# Patient Record
Sex: FEMALE | Race: BLACK OR AFRICAN AMERICAN | NOT HISPANIC OR LATINO | Employment: FULL TIME | URBAN - METROPOLITAN AREA
[De-identification: names, ages, dates, MRNs, and addresses within clinical notes are randomized per-mention and may not be internally consistent; named-entity substitution may affect disease eponyms.]

---

## 2021-07-06 ENCOUNTER — OFFICE VISIT (OUTPATIENT)
Dept: CARDIOLOGY | Facility: CLINIC | Age: 61
End: 2021-07-06
Payer: COMMERCIAL

## 2021-07-06 VITALS
BODY MASS INDEX: 51.91 KG/M2 | HEART RATE: 40 BPM | SYSTOLIC BLOOD PRESSURE: 134 MMHG | OXYGEN SATURATION: 95 % | HEIGHT: 63 IN | DIASTOLIC BLOOD PRESSURE: 92 MMHG | WEIGHT: 293 LBS

## 2021-07-06 DIAGNOSIS — I10 ESSENTIAL HYPERTENSION: ICD-10-CM

## 2021-07-06 DIAGNOSIS — R00.1 BRADYCARDIA ON ECG: ICD-10-CM

## 2021-07-06 DIAGNOSIS — E05.90 HYPERTHYROIDISM: ICD-10-CM

## 2021-07-06 PROCEDURE — 1126F PR PAIN SEVERITY QUANTIFIED, NO PAIN PRESENT: ICD-10-PCS | Mod: S$GLB,,, | Performed by: INTERNAL MEDICINE

## 2021-07-06 PROCEDURE — 99999 PR PBB SHADOW E&M-NEW PATIENT-LVL III: CPT | Mod: PBBFAC,,, | Performed by: INTERNAL MEDICINE

## 2021-07-06 PROCEDURE — 99999 PR PBB SHADOW E&M-NEW PATIENT-LVL III: ICD-10-PCS | Mod: PBBFAC,,, | Performed by: INTERNAL MEDICINE

## 2021-07-06 PROCEDURE — 93000 EKG 12-LEAD: ICD-10-PCS | Mod: S$GLB,,, | Performed by: INTERNAL MEDICINE

## 2021-07-06 PROCEDURE — 99204 OFFICE O/P NEW MOD 45 MIN: CPT | Mod: S$GLB,,, | Performed by: INTERNAL MEDICINE

## 2021-07-06 PROCEDURE — 3008F BODY MASS INDEX DOCD: CPT | Mod: CPTII,S$GLB,, | Performed by: INTERNAL MEDICINE

## 2021-07-06 PROCEDURE — 1126F AMNT PAIN NOTED NONE PRSNT: CPT | Mod: S$GLB,,, | Performed by: INTERNAL MEDICINE

## 2021-07-06 PROCEDURE — 93000 ELECTROCARDIOGRAM COMPLETE: CPT | Mod: S$GLB,,, | Performed by: INTERNAL MEDICINE

## 2021-07-06 PROCEDURE — 3008F PR BODY MASS INDEX (BMI) DOCUMENTED: ICD-10-PCS | Mod: CPTII,S$GLB,, | Performed by: INTERNAL MEDICINE

## 2021-07-06 PROCEDURE — 99204 PR OFFICE/OUTPT VISIT, NEW, LEVL IV, 45-59 MIN: ICD-10-PCS | Mod: S$GLB,,, | Performed by: INTERNAL MEDICINE

## 2021-07-06 RX ORDER — FLUTICASONE PROPIONATE 50 MCG
1 SPRAY, SUSPENSION (ML) NASAL 2 TIMES DAILY
COMMUNITY
Start: 2021-05-19 | End: 2022-09-01

## 2021-07-06 RX ORDER — AMLODIPINE BESYLATE 10 MG/1
10 TABLET ORAL DAILY
Status: ON HOLD | COMMUNITY
Start: 2021-04-15 | End: 2022-12-09 | Stop reason: HOSPADM

## 2021-07-06 RX ORDER — METHIMAZOLE 5 MG/1
5 TABLET ORAL DAILY
COMMUNITY
Start: 2021-05-31 | End: 2022-09-01

## 2021-07-06 RX ORDER — MONTELUKAST SODIUM 10 MG/1
10 TABLET ORAL DAILY
Status: ON HOLD | COMMUNITY
Start: 2021-05-19 | End: 2022-12-09 | Stop reason: HOSPADM

## 2021-07-06 RX ORDER — CYANOCOBALAMIN (VITAMIN B-12) 1000 MCG
TABLET, EXTENDED RELEASE ORAL
COMMUNITY
End: 2022-09-01

## 2021-07-07 ENCOUNTER — HOSPITAL ENCOUNTER (OUTPATIENT)
Dept: CARDIOLOGY | Facility: OTHER | Age: 61
Discharge: HOME | End: 2021-07-07
Attending: INTERNAL MEDICINE
Payer: COMMERCIAL

## 2021-07-07 VITALS
HEART RATE: 40 BPM | BODY MASS INDEX: 51.91 KG/M2 | HEIGHT: 63 IN | WEIGHT: 293 LBS | DIASTOLIC BLOOD PRESSURE: 92 MMHG | SYSTOLIC BLOOD PRESSURE: 134 MMHG

## 2021-07-07 DIAGNOSIS — R00.1 BRADYCARDIA ON ECG: ICD-10-CM

## 2021-07-07 LAB
ASCENDING AORTA: 3.02 CM
AV INDEX (PROSTH): 0.87
AV MEAN GRADIENT: 8 MMHG
AV PEAK GRADIENT: 16 MMHG
AV VALVE AREA: 3.03 CM2
AV VELOCITY RATIO: 0.85
BSA FOR ECHO PROCEDURE: 2.5 M2
CV ECHO LV RWT: 0.32 CM
DOP CALC AO PEAK VEL: 2.03 M/S
DOP CALC AO VTI: 45.04 CM
DOP CALC LVOT AREA: 3.5 CM2
DOP CALC LVOT DIAMETER: 2.11 CM
DOP CALC LVOT PEAK VEL: 1.72 M/S
DOP CALC LVOT STROKE VOLUME: 136.3 CM3
DOP CALCLVOT PEAK VEL VTI: 39 CM
E WAVE DECELERATION TIME: 153.37 MSEC
E/A RATIO: 1.24
E/E' RATIO: 7.16 M/S
ECHO LV POSTERIOR WALL: 0.89 CM (ref 0.6–1.1)
EJECTION FRACTION: 69 %
FRACTIONAL SHORTENING: 39 % (ref 28–44)
INTERVENTRICULAR SEPTUM: 0.91 CM (ref 0.6–1.1)
IVRT: 110.91 MSEC
LA MAJOR: 5.92 CM
LA MINOR: 5.66 CM
LA WIDTH: 4.53 CM
LEFT ATRIUM SIZE: 3.82 CM
LEFT ATRIUM VOLUME INDEX MOD: 41.6 ML/M2
LEFT ATRIUM VOLUME INDEX: 36.5 ML/M2
LEFT ATRIUM VOLUME MOD: 97 CM3
LEFT ATRIUM VOLUME: 85.12 CM3
LEFT INTERNAL DIMENSION IN SYSTOLE: 3.44 CM (ref 2.1–4)
LEFT VENTRICLE DIASTOLIC VOLUME INDEX: 66.48 ML/M2
LEFT VENTRICLE DIASTOLIC VOLUME: 154.91 ML
LEFT VENTRICLE MASS INDEX: 83 G/M2
LEFT VENTRICLE SYSTOLIC VOLUME INDEX: 20.9 ML/M2
LEFT VENTRICLE SYSTOLIC VOLUME: 48.69 ML
LEFT VENTRICULAR INTERNAL DIMENSION IN DIASTOLE: 5.62 CM (ref 3.5–6)
LEFT VENTRICULAR MASS: 192.8 G
LV LATERAL E/E' RATIO: 7.56 M/S
LV SEPTAL E/E' RATIO: 6.8 M/S
MV A" WAVE DURATION": 9.43 MSEC
MV PEAK A VEL: 0.55 M/S
MV PEAK E VEL: 0.68 M/S
MV STENOSIS PRESSURE HALF TIME: 44.48 MS
MV VALVE AREA P 1/2 METHOD: 4.95 CM2
PISA MRMAX VEL: 0.02 M/S
PISA TR MAX VEL: 1.93 M/S
PULM VEIN S/D RATIO: 1.55
PV PEAK D VEL: 0.42 M/S
PV PEAK S VEL: 0.65 M/S
PV PEAK VELOCITY: 1.41 CM/S
RA MAJOR: 5.69 CM
RA PRESSURE: 3 MMHG
RA WIDTH: 3.57 CM
RIGHT VENTRICULAR END-DIASTOLIC DIMENSION: 2.63 CM
RV TISSUE DOPPLER FREE WALL SYSTOLIC VELOCITY 1 (APICAL 4 CHAMBER VIEW): 15.75 CM/S
SINUS: 3.2 CM
STJ: 2.94 CM
TDI LATERAL: 0.09 M/S
TDI SEPTAL: 0.1 M/S
TDI: 0.1 M/S
TR MAX PG: 15 MMHG
TRICUSPID ANNULAR PLANE SYSTOLIC EXCURSION: 2.83 CM
TV REST PULMONARY ARTERY PRESSURE: 18 MMHG

## 2021-07-07 PROCEDURE — 93306 ECHO (CUPID ONLY): ICD-10-PCS | Mod: 26,,, | Performed by: INTERNAL MEDICINE

## 2021-07-07 PROCEDURE — 93306 TTE W/DOPPLER COMPLETE: CPT

## 2021-07-07 PROCEDURE — 93226 XTRNL ECG REC<48 HR SCAN A/R: CPT

## 2021-07-07 PROCEDURE — 93306 TTE W/DOPPLER COMPLETE: CPT | Mod: 26,,, | Performed by: INTERNAL MEDICINE

## 2021-07-07 PROCEDURE — 93227 HOLTER MONITOR - 24 HOUR (CUPID ONLY): ICD-10-PCS | Mod: ,,, | Performed by: INTERNAL MEDICINE

## 2021-07-07 PROCEDURE — 93227 XTRNL ECG REC<48 HR R&I: CPT | Mod: ,,, | Performed by: INTERNAL MEDICINE

## 2021-07-14 LAB
OHS CV EVENT MONITOR DAY: 0
OHS CV HOLTER LENGTH DECIMAL HOURS: 24
OHS CV HOLTER LENGTH HOURS: 24
OHS CV HOLTER LENGTH MINUTES: 0

## 2021-07-16 ENCOUNTER — OFFICE VISIT (OUTPATIENT)
Dept: CARDIOLOGY | Facility: CLINIC | Age: 61
End: 2021-07-16
Payer: COMMERCIAL

## 2021-07-16 VITALS
BODY MASS INDEX: 51.91 KG/M2 | SYSTOLIC BLOOD PRESSURE: 130 MMHG | OXYGEN SATURATION: 98 % | HEIGHT: 63 IN | HEART RATE: 47 BPM | DIASTOLIC BLOOD PRESSURE: 80 MMHG | WEIGHT: 293 LBS

## 2021-07-16 DIAGNOSIS — R00.1 BRADYCARDIA ON ECG: ICD-10-CM

## 2021-07-16 DIAGNOSIS — I10 ESSENTIAL HYPERTENSION: Primary | ICD-10-CM

## 2021-07-16 PROCEDURE — 99999 PR PBB SHADOW E&M-EST. PATIENT-LVL III: CPT | Mod: PBBFAC,,, | Performed by: INTERNAL MEDICINE

## 2021-07-16 PROCEDURE — 3079F DIAST BP 80-89 MM HG: CPT | Mod: CPTII,S$GLB,, | Performed by: INTERNAL MEDICINE

## 2021-07-16 PROCEDURE — 1126F PR PAIN SEVERITY QUANTIFIED, NO PAIN PRESENT: ICD-10-PCS | Mod: S$GLB,,, | Performed by: INTERNAL MEDICINE

## 2021-07-16 PROCEDURE — 99214 OFFICE O/P EST MOD 30 MIN: CPT | Mod: S$GLB,,, | Performed by: INTERNAL MEDICINE

## 2021-07-16 PROCEDURE — 3008F PR BODY MASS INDEX (BMI) DOCUMENTED: ICD-10-PCS | Mod: CPTII,S$GLB,, | Performed by: INTERNAL MEDICINE

## 2021-07-16 PROCEDURE — 3075F PR MOST RECENT SYSTOLIC BLOOD PRESS GE 130-139MM HG: ICD-10-PCS | Mod: CPTII,S$GLB,, | Performed by: INTERNAL MEDICINE

## 2021-07-16 PROCEDURE — 3008F BODY MASS INDEX DOCD: CPT | Mod: CPTII,S$GLB,, | Performed by: INTERNAL MEDICINE

## 2021-07-16 PROCEDURE — 3079F PR MOST RECENT DIASTOLIC BLOOD PRESSURE 80-89 MM HG: ICD-10-PCS | Mod: CPTII,S$GLB,, | Performed by: INTERNAL MEDICINE

## 2021-07-16 PROCEDURE — 3075F SYST BP GE 130 - 139MM HG: CPT | Mod: CPTII,S$GLB,, | Performed by: INTERNAL MEDICINE

## 2021-07-16 PROCEDURE — 99214 PR OFFICE/OUTPT VISIT, EST, LEVL IV, 30-39 MIN: ICD-10-PCS | Mod: S$GLB,,, | Performed by: INTERNAL MEDICINE

## 2021-07-16 PROCEDURE — 99999 PR PBB SHADOW E&M-EST. PATIENT-LVL III: ICD-10-PCS | Mod: PBBFAC,,, | Performed by: INTERNAL MEDICINE

## 2021-07-16 PROCEDURE — 1126F AMNT PAIN NOTED NONE PRSNT: CPT | Mod: S$GLB,,, | Performed by: INTERNAL MEDICINE

## 2021-10-19 ENCOUNTER — OFFICE VISIT (OUTPATIENT)
Dept: CARDIOLOGY | Facility: CLINIC | Age: 61
End: 2021-10-19
Payer: COMMERCIAL

## 2021-10-19 VITALS
HEART RATE: 46 BPM | OXYGEN SATURATION: 99 % | SYSTOLIC BLOOD PRESSURE: 114 MMHG | WEIGHT: 293 LBS | DIASTOLIC BLOOD PRESSURE: 72 MMHG | BODY MASS INDEX: 51.91 KG/M2 | HEIGHT: 63 IN

## 2021-10-19 DIAGNOSIS — I10 ESSENTIAL HYPERTENSION: Primary | ICD-10-CM

## 2021-10-19 DIAGNOSIS — R00.1 BRADYCARDIA ON ECG: ICD-10-CM

## 2021-10-19 DIAGNOSIS — E05.90 HYPERTHYROIDISM: ICD-10-CM

## 2021-10-19 PROCEDURE — 3008F PR BODY MASS INDEX (BMI) DOCUMENTED: ICD-10-PCS | Mod: CPTII,S$GLB,, | Performed by: INTERNAL MEDICINE

## 2021-10-19 PROCEDURE — 1160F RVW MEDS BY RX/DR IN RCRD: CPT | Mod: CPTII,S$GLB,, | Performed by: INTERNAL MEDICINE

## 2021-10-19 PROCEDURE — 3074F SYST BP LT 130 MM HG: CPT | Mod: CPTII,S$GLB,, | Performed by: INTERNAL MEDICINE

## 2021-10-19 PROCEDURE — 1159F MED LIST DOCD IN RCRD: CPT | Mod: CPTII,S$GLB,, | Performed by: INTERNAL MEDICINE

## 2021-10-19 PROCEDURE — 3078F DIAST BP <80 MM HG: CPT | Mod: CPTII,S$GLB,, | Performed by: INTERNAL MEDICINE

## 2021-10-19 PROCEDURE — 3074F PR MOST RECENT SYSTOLIC BLOOD PRESSURE < 130 MM HG: ICD-10-PCS | Mod: CPTII,S$GLB,, | Performed by: INTERNAL MEDICINE

## 2021-10-19 PROCEDURE — 99999 PR PBB SHADOW E&M-EST. PATIENT-LVL III: CPT | Mod: PBBFAC,,, | Performed by: INTERNAL MEDICINE

## 2021-10-19 PROCEDURE — 99214 PR OFFICE/OUTPT VISIT, EST, LEVL IV, 30-39 MIN: ICD-10-PCS | Mod: S$GLB,,, | Performed by: INTERNAL MEDICINE

## 2021-10-19 PROCEDURE — 99999 PR PBB SHADOW E&M-EST. PATIENT-LVL III: ICD-10-PCS | Mod: PBBFAC,,, | Performed by: INTERNAL MEDICINE

## 2021-10-19 PROCEDURE — 1160F PR REVIEW ALL MEDS BY PRESCRIBER/CLIN PHARMACIST DOCUMENTED: ICD-10-PCS | Mod: CPTII,S$GLB,, | Performed by: INTERNAL MEDICINE

## 2021-10-19 PROCEDURE — 3008F BODY MASS INDEX DOCD: CPT | Mod: CPTII,S$GLB,, | Performed by: INTERNAL MEDICINE

## 2021-10-19 PROCEDURE — 3078F PR MOST RECENT DIASTOLIC BLOOD PRESSURE < 80 MM HG: ICD-10-PCS | Mod: CPTII,S$GLB,, | Performed by: INTERNAL MEDICINE

## 2021-10-19 PROCEDURE — 1159F PR MEDICATION LIST DOCUMENTED IN MEDICAL RECORD: ICD-10-PCS | Mod: CPTII,S$GLB,, | Performed by: INTERNAL MEDICINE

## 2021-10-19 PROCEDURE — 99214 OFFICE O/P EST MOD 30 MIN: CPT | Mod: S$GLB,,, | Performed by: INTERNAL MEDICINE

## 2022-03-23 ENCOUNTER — OFFICE VISIT (OUTPATIENT)
Dept: CARDIOLOGY | Facility: CLINIC | Age: 62
End: 2022-03-23
Payer: COMMERCIAL

## 2022-03-23 VITALS
HEART RATE: 57 BPM | HEIGHT: 63 IN | WEIGHT: 293 LBS | SYSTOLIC BLOOD PRESSURE: 136 MMHG | DIASTOLIC BLOOD PRESSURE: 80 MMHG | OXYGEN SATURATION: 96 % | BODY MASS INDEX: 51.91 KG/M2

## 2022-03-23 DIAGNOSIS — R00.1 BRADYCARDIA ON ECG: ICD-10-CM

## 2022-03-23 DIAGNOSIS — E05.90 HYPERTHYROIDISM: ICD-10-CM

## 2022-03-23 DIAGNOSIS — I10 ESSENTIAL HYPERTENSION: Primary | ICD-10-CM

## 2022-03-23 PROCEDURE — 1159F PR MEDICATION LIST DOCUMENTED IN MEDICAL RECORD: ICD-10-PCS | Mod: CPTII,S$GLB,, | Performed by: INTERNAL MEDICINE

## 2022-03-23 PROCEDURE — 99999 PR PBB SHADOW E&M-EST. PATIENT-LVL III: ICD-10-PCS | Mod: PBBFAC,,, | Performed by: INTERNAL MEDICINE

## 2022-03-23 PROCEDURE — 3008F BODY MASS INDEX DOCD: CPT | Mod: CPTII,S$GLB,, | Performed by: INTERNAL MEDICINE

## 2022-03-23 PROCEDURE — 3075F SYST BP GE 130 - 139MM HG: CPT | Mod: CPTII,S$GLB,, | Performed by: INTERNAL MEDICINE

## 2022-03-23 PROCEDURE — 3079F DIAST BP 80-89 MM HG: CPT | Mod: CPTII,S$GLB,, | Performed by: INTERNAL MEDICINE

## 2022-03-23 PROCEDURE — 3075F PR MOST RECENT SYSTOLIC BLOOD PRESS GE 130-139MM HG: ICD-10-PCS | Mod: CPTII,S$GLB,, | Performed by: INTERNAL MEDICINE

## 2022-03-23 PROCEDURE — 3008F PR BODY MASS INDEX (BMI) DOCUMENTED: ICD-10-PCS | Mod: CPTII,S$GLB,, | Performed by: INTERNAL MEDICINE

## 2022-03-23 PROCEDURE — 3079F PR MOST RECENT DIASTOLIC BLOOD PRESSURE 80-89 MM HG: ICD-10-PCS | Mod: CPTII,S$GLB,, | Performed by: INTERNAL MEDICINE

## 2022-03-23 PROCEDURE — 99999 PR PBB SHADOW E&M-EST. PATIENT-LVL III: CPT | Mod: PBBFAC,,, | Performed by: INTERNAL MEDICINE

## 2022-03-23 PROCEDURE — 99214 PR OFFICE/OUTPT VISIT, EST, LEVL IV, 30-39 MIN: ICD-10-PCS | Mod: S$GLB,,, | Performed by: INTERNAL MEDICINE

## 2022-03-23 PROCEDURE — 99214 OFFICE O/P EST MOD 30 MIN: CPT | Mod: S$GLB,,, | Performed by: INTERNAL MEDICINE

## 2022-03-23 PROCEDURE — 1159F MED LIST DOCD IN RCRD: CPT | Mod: CPTII,S$GLB,, | Performed by: INTERNAL MEDICINE

## 2022-03-23 NOTE — PROGRESS NOTES
Cardiology    3/23/2022  2:56 PM    Problem list  Patient Active Problem List   Diagnosis    Right knee injury    Bradycardia on ECG    Essential hypertension    Hyperthyroidism       CC:  Follow-up    HPI:  She is doing well.  She denies any chest pain, shortness breath, palpitation syncope.  She denies any fatigue or dizziness.      Medications  Current Outpatient Medications   Medication Sig Dispense Refill    amLODIPine (NORVASC) 10 MG tablet Take 10 mg by mouth once daily.      famotidine (PEPCID) 20 MG tablet       fluticasone propionate (FLONASE) 50 mcg/actuation nasal spray 1 spray by Each Nostril route 2 (two) times daily.      methIMAzole (TAPAZOLE) 5 MG Tab Take 5 mg by mouth once daily.      montelukast (SINGULAIR) 10 mg tablet Take 10 mg by mouth once daily.      selenium 200 mcg Cap Take by mouth.      triamterene-hydrochlorothiazide 37.5-25 mg (MAXZIDE-25) 37.5-25 mg per tablet Take 1 tablet by mouth once daily.       No current facility-administered medications for this visit.      Prior to Admission medications    Medication Sig Start Date End Date Taking? Authorizing Provider   amLODIPine (NORVASC) 10 MG tablet Take 10 mg by mouth once daily. 4/15/21  Yes Historical Provider   famotidine (PEPCID) 20 MG tablet  8/5/14  Yes Historical Provider   fluticasone propionate (FLONASE) 50 mcg/actuation nasal spray 1 spray by Each Nostril route 2 (two) times daily. 5/19/21  Yes Historical Provider   methIMAzole (TAPAZOLE) 5 MG Tab Take 5 mg by mouth once daily. 5/31/21  Yes Historical Provider   montelukast (SINGULAIR) 10 mg tablet Take 10 mg by mouth once daily. 5/19/21  Yes Historical Provider   selenium 200 mcg Cap Take by mouth.   Yes Historical Provider   triamterene-hydrochlorothiazide 37.5-25 mg (MAXZIDE-25) 37.5-25 mg per tablet Take 1 tablet by mouth once daily.   Yes Historical Provider         History  Past Medical History:   Diagnosis Date    Hypertension     Hyperthyroidism       No past surgical history on file.  Social History     Socioeconomic History    Marital status: Single   Tobacco Use    Smoking status: Never Smoker    Smokeless tobacco: Never Used   Substance and Sexual Activity    Alcohol use: Yes     Comment: occ    Drug use: Never         Allergies  Review of patient's allergies indicates:  No Known Allergies      Review of Systems   Review of Systems   Constitutional: Negative for decreased appetite, fever and weight loss.   HENT: Negative for congestion and nosebleeds.    Eyes: Negative for double vision, vision loss in left eye, vision loss in right eye and visual disturbance.   Cardiovascular: Negative for chest pain, claudication, cyanosis, dyspnea on exertion, irregular heartbeat, leg swelling, near-syncope, orthopnea, palpitations, paroxysmal nocturnal dyspnea and syncope.   Respiratory: Negative for cough, hemoptysis, shortness of breath, sleep disturbances due to breathing, snoring, sputum production and wheezing.    Endocrine: Negative for cold intolerance and heat intolerance.   Skin: Negative for nail changes and rash.   Musculoskeletal: Negative for joint pain, muscle cramps, muscle weakness and myalgias.   Gastrointestinal: Negative for change in bowel habit, heartburn, hematemesis, hematochezia, hemorrhoids and melena.   Neurological: Negative for dizziness, focal weakness and headaches.         Physical Exam  Wt Readings from Last 1 Encounters:   03/23/22 (!) 142.8 kg (314 lb 13.1 oz)     BP Readings from Last 3 Encounters:   03/23/22 136/80   10/19/21 114/72   07/16/21 130/80     Pulse Readings from Last 1 Encounters:   03/23/22 (!) 57     Body mass index is 55.77 kg/m².    Physical Exam  Vitals reviewed.   Constitutional:       Appearance: She is well-developed. She is obese.   HENT:      Head: Atraumatic.   Eyes:      General: No scleral icterus.  Neck:      Vascular: Normal carotid pulses. No carotid bruit, hepatojugular reflux or JVD.    Cardiovascular:      Rate and Rhythm: Normal rate and regular rhythm.      Chest Wall: PMI is not displaced.      Pulses: Intact distal pulses.           Carotid pulses are 2+ on the right side and 2+ on the left side.       Radial pulses are 2+ on the right side and 2+ on the left side.        Dorsalis pedis pulses are 2+ on the right side and 2+ on the left side.      Heart sounds: S1 normal and S2 normal. Murmur heard.    Early systolic murmur is present with a grade of 2/6 at the upper right sternal border.    No friction rub.   Pulmonary:      Effort: Pulmonary effort is normal. No respiratory distress.      Breath sounds: Normal breath sounds. No stridor. No wheezing or rales.   Chest:      Chest wall: No tenderness.   Abdominal:      General: Bowel sounds are normal.      Palpations: Abdomen is soft.   Musculoskeletal:      Cervical back: Neck supple. No edema.      Right lower leg: No edema.      Left lower leg: No edema.   Skin:     General: Skin is warm and dry.      Nails: There is no clubbing.   Neurological:      Mental Status: She is alert and oriented to person, place, and time.   Psychiatric:         Behavior: Behavior normal.         Thought Content: Thought content normal.             Assessment  1. Essential hypertension  Controlled on meds    2. Bradycardia on ECG  asymptomatic    3. Hyperthyroidism  unchanged        Plan and Discussion  Continue medications.  Discussed bradycardia symptoms to be aware.    Follow Up  6 months      Montana Koehler MD, F.A.C.C, F.S.C.A.I.

## 2022-08-30 ENCOUNTER — TELEPHONE (OUTPATIENT)
Dept: CARDIOLOGY | Facility: CLINIC | Age: 62
End: 2022-08-30
Payer: COMMERCIAL

## 2022-08-30 NOTE — TELEPHONE ENCOUNTER
Spoke to pt. Scheduled follow up appointment for 9/1/22 at 2pm at [facility]. Office address provided to pt. Pt verbalized understanding.

## 2022-08-30 NOTE — TELEPHONE ENCOUNTER
----- Message from Carlos Irving sent at 8/30/2022  9:06 AM CDT -----  Name of Who is Calling: BERRY RAYO [[id number]]            What is the request in detail: Patient is requesting call back for a check up appointment bc her heart is beating fast no appointments available til 10/18              Can the clinic reply by [facility]: no              [facility]: [phone]

## 2022-09-01 ENCOUNTER — OFFICE VISIT (OUTPATIENT)
Dept: CARDIOLOGY | Facility: CLINIC | Age: 62
End: 2022-09-01
Payer: COMMERCIAL

## 2022-09-01 VITALS
DIASTOLIC BLOOD PRESSURE: 66 MMHG | SYSTOLIC BLOOD PRESSURE: 118 MMHG | BODY MASS INDEX: 51.91 KG/M2 | HEART RATE: 56 BPM | OXYGEN SATURATION: 98 % | HEIGHT: 63 IN | WEIGHT: 293 LBS

## 2022-09-01 DIAGNOSIS — E05.90 HYPERTHYROIDISM: ICD-10-CM

## 2022-09-01 DIAGNOSIS — R00.1 BRADYCARDIA ON ECG: ICD-10-CM

## 2022-09-01 DIAGNOSIS — I10 ESSENTIAL HYPERTENSION: Primary | ICD-10-CM

## 2022-09-01 PROCEDURE — 3074F SYST BP LT 130 MM HG: CPT | Mod: CPTII,S$GLB,, | Performed by: INTERNAL MEDICINE

## 2022-09-01 PROCEDURE — 3008F PR BODY MASS INDEX (BMI) DOCUMENTED: ICD-10-PCS | Mod: CPTII,S$GLB,, | Performed by: INTERNAL MEDICINE

## 2022-09-01 PROCEDURE — 3008F BODY MASS INDEX DOCD: CPT | Mod: CPTII,S$GLB,, | Performed by: INTERNAL MEDICINE

## 2022-09-01 PROCEDURE — 99214 OFFICE O/P EST MOD 30 MIN: CPT | Mod: S$GLB,,, | Performed by: INTERNAL MEDICINE

## 2022-09-01 PROCEDURE — 93005 ELECTROCARDIOGRAM TRACING: CPT

## 2022-09-01 PROCEDURE — 3074F PR MOST RECENT SYSTOLIC BLOOD PRESSURE < 130 MM HG: ICD-10-PCS | Mod: CPTII,S$GLB,, | Performed by: INTERNAL MEDICINE

## 2022-09-01 PROCEDURE — 3078F DIAST BP <80 MM HG: CPT | Mod: CPTII,S$GLB,, | Performed by: INTERNAL MEDICINE

## 2022-09-01 PROCEDURE — 99214 PR OFFICE/OUTPT VISIT, EST, LEVL IV, 30-39 MIN: ICD-10-PCS | Mod: S$GLB,,, | Performed by: INTERNAL MEDICINE

## 2022-09-01 PROCEDURE — 99999 PR PBB SHADOW E&M-EST. PATIENT-LVL III: CPT | Mod: PBBFAC,,, | Performed by: INTERNAL MEDICINE

## 2022-09-01 PROCEDURE — 93010 EKG 12-LEAD: ICD-10-PCS | Mod: S$GLB,,, | Performed by: INTERNAL MEDICINE

## 2022-09-01 PROCEDURE — 99999 PR PBB SHADOW E&M-EST. PATIENT-LVL III: ICD-10-PCS | Mod: PBBFAC,,, | Performed by: INTERNAL MEDICINE

## 2022-09-01 PROCEDURE — 93010 ELECTROCARDIOGRAM REPORT: CPT | Mod: S$GLB,,, | Performed by: INTERNAL MEDICINE

## 2022-09-01 PROCEDURE — 3078F PR MOST RECENT DIASTOLIC BLOOD PRESSURE < 80 MM HG: ICD-10-PCS | Mod: CPTII,S$GLB,, | Performed by: INTERNAL MEDICINE

## 2022-09-01 NOTE — PROGRESS NOTES
Cardiology    9/1/2022  2:19 PM    Problem list  Patient Active Problem List   Diagnosis    Right knee injury    Bradycardia on ECG    Essential hypertension    Hyperthyroidism    BMI 50.0-59.9, adult       CC:  Palpitations    HPI:  She reported having palpitations since her last visit.  She described feeling heart race.  This occurred while she was at home.  Was not associated with any exertion.  She denies any angina, dyspnea exertion, syncope.  She had labs done with her endocrinologist in July which showed normal thyroid level.    Medications  Current Outpatient Medications   Medication Sig Dispense Refill    amLODIPine (NORVASC) 10 MG tablet Take 10 mg by mouth once daily.      montelukast (SINGULAIR) 10 mg tablet Take 10 mg by mouth once daily.      triamterene-hydrochlorothiazide 37.5-25 mg (MAXZIDE-25) 37.5-25 mg per tablet Take 1 tablet by mouth once daily.       No current facility-administered medications for this visit.      Prior to Admission medications    Medication Sig Start Date End Date Taking? Authorizing Provider   amLODIPine (NORVASC) 10 MG tablet Take 10 mg by mouth once daily. 4/15/21  Yes Historical Provider   montelukast (SINGULAIR) 10 mg tablet Take 10 mg by mouth once daily. 5/19/21  Yes Historical Provider   triamterene-hydrochlorothiazide 37.5-25 mg (MAXZIDE-25) 37.5-25 mg per tablet Take 1 tablet by mouth once daily.   Yes Historical Provider   prochlorperazine (COMPAZINE) 10 MG tablet Take 1 tablet (10 mg total) by mouth every 6 (six) hours as needed. 3/25/22 9/1/22 Yes Klever Posey MD   famotidine (PEPCID) 20 MG tablet  8/5/14 9/1/22  Historical Provider   fluticasone propionate (FLONASE) 50 mcg/actuation nasal spray 1 spray by Each Nostril route 2 (two) times daily. 5/19/21 9/1/22  Historical Provider   methIMAzole (TAPAZOLE) 5 MG Tab Take 5 mg by mouth once daily. 5/31/21 9/1/22  Historical Provider   selenium 200 mcg Cap Take by mouth.  9/1/22  Historical Provider          History  Past Medical History:   Diagnosis Date    Hypertension     Hyperthyroidism      No past surgical history on file.  Social History     Socioeconomic History    Marital status: Single   Tobacco Use    Smoking status: Never    Smokeless tobacco: Never   Substance and Sexual Activity    Alcohol use: Yes     Comment: occ    Drug use: Never         Allergies  Review of patient's allergies indicates:  No Known Allergies      Review of Systems   Review of Systems   Constitutional: Negative for decreased appetite, fever and weight loss.   HENT:  Negative for congestion and nosebleeds.    Eyes:  Negative for double vision, vision loss in left eye, vision loss in right eye and visual disturbance.   Cardiovascular:  Positive for palpitations. Negative for chest pain, claudication, cyanosis, dyspnea on exertion, irregular heartbeat, leg swelling, near-syncope, orthopnea, paroxysmal nocturnal dyspnea and syncope.   Respiratory:  Negative for cough, hemoptysis, shortness of breath, sleep disturbances due to breathing, snoring, sputum production and wheezing.    Endocrine: Negative for cold intolerance and heat intolerance.   Skin:  Negative for nail changes and rash.   Musculoskeletal:  Negative for joint pain, muscle cramps, muscle weakness and myalgias.   Gastrointestinal:  Negative for change in bowel habit, heartburn, hematemesis, hematochezia, hemorrhoids and melena.   Neurological:  Negative for dizziness, focal weakness and headaches.       Physical Exam  Wt Readings from Last 1 Encounters:   09/01/22 (!) 140 kg (308 lb 9.6 oz)     BP Readings from Last 3 Encounters:   09/01/22 118/66   03/25/22 117/69   03/23/22 136/80     Pulse Readings from Last 1 Encounters:   09/01/22 (!) 56     Body mass index is 54.67 kg/m².    Physical Exam  Vitals reviewed.   Constitutional:       Appearance: She is well-developed. She is obese.   HENT:      Head: Atraumatic.   Eyes:      General: No scleral icterus.  Neck:       Vascular: Normal carotid pulses. No carotid bruit, hepatojugular reflux or JVD.   Cardiovascular:      Rate and Rhythm: Normal rate and regular rhythm.      Chest Wall: PMI is not displaced.      Pulses: Intact distal pulses.           Carotid pulses are 2+ on the right side and 2+ on the left side.       Radial pulses are 2+ on the right side and 2+ on the left side.        Dorsalis pedis pulses are 2+ on the right side and 2+ on the left side.      Heart sounds: S1 normal and S2 normal. Murmur heard.   Early systolic murmur is present with a grade of 2/6 at the upper right sternal border.     No friction rub.   Pulmonary:      Effort: Pulmonary effort is normal. No respiratory distress.      Breath sounds: Normal breath sounds. No stridor. No wheezing or rales.   Chest:      Chest wall: No tenderness.   Abdominal:      General: Bowel sounds are normal.      Palpations: Abdomen is soft.   Musculoskeletal:      Cervical back: Neck supple. No edema.      Right lower leg: No edema.      Left lower leg: No edema.   Skin:     General: Skin is warm and dry.      Nails: There is no clubbing.   Neurological:      Mental Status: She is alert and oriented to person, place, and time.   Psychiatric:         Behavior: Behavior normal.         Thought Content: Thought content normal.       Sinus rhythm rate of 47    Assessment  1. Essential hypertension  Controlled    2. Bradycardia on ECG  Sinus rhythm rate of 47 her EKG  - CBC Without Differential; Future  - Comprehensive Metabolic Panel; Future  - Echo; Future  - Cardiac event monitor; Future    3. Hyperthyroidism  Stable    4. BMI 50.0-59.9, adult  Unchanged        Plan and Discussion  Continue current medication.  Given her palpitation, will proceed with echocardiogram, labs and event monitor.    Follow Up  Two months      Montana Koehler MD, F.A.C.C, F.S.C.A.I.

## 2022-09-08 ENCOUNTER — CLINICAL SUPPORT (OUTPATIENT)
Dept: CARDIOLOGY | Facility: HOSPITAL | Age: 62
End: 2022-09-08
Attending: INTERNAL MEDICINE
Payer: COMMERCIAL

## 2022-09-08 DIAGNOSIS — R00.1 BRADYCARDIA ON ECG: ICD-10-CM

## 2022-09-08 PROCEDURE — 93270 REMOTE 30 DAY ECG REV/REPORT: CPT

## 2022-09-08 PROCEDURE — 93272 ECG/REVIEW INTERPRET ONLY: CPT | Mod: ,,, | Performed by: INTERNAL MEDICINE

## 2022-09-08 PROCEDURE — 93272 CARDIAC EVENT MONITOR (CUPID ONLY): ICD-10-PCS | Mod: ,,, | Performed by: INTERNAL MEDICINE

## 2022-09-09 ENCOUNTER — TELEPHONE (OUTPATIENT)
Dept: ELECTROPHYSIOLOGY | Facility: CLINIC | Age: 62
End: 2022-09-09
Payer: COMMERCIAL

## 2022-09-09 NOTE — TELEPHONE ENCOUNTER
Tried to contact the pt a 3rd time and unable to leave a voicemail    Will forward info to ordering physician

## 2022-09-09 NOTE — TELEPHONE ENCOUNTER
Patient wearing 30 day event monitor for diagnosis bradycardia     Received auto-triggered alert notification for    9/8/2022 @ 2133 for SBwith PACs rate 34 bpm  9/9/22 @ 0008 for AT with PACs  9/9/22 @ 0500 for SB with PACs rate 33 bpm       Tried to call  patient to assess for symptoms twice.  Unable to leave voicemail.    Prescribed Norvasc 10 mg po daily           Strips placed under this encounter for review. Message sent to ordering provider. Will continue to monitor until  9/8/22                    ____                       _

## 2022-09-12 ENCOUNTER — PATIENT MESSAGE (OUTPATIENT)
Dept: CARDIOLOGY | Facility: CLINIC | Age: 62
End: 2022-09-12
Payer: COMMERCIAL

## 2022-09-12 DIAGNOSIS — R00.1 BRADYCARDIA ON ECG: Primary | ICD-10-CM

## 2022-09-13 ENCOUNTER — HOSPITAL ENCOUNTER (OUTPATIENT)
Dept: CARDIOLOGY | Facility: HOSPITAL | Age: 62
Discharge: HOME | End: 2022-09-13
Attending: INTERNAL MEDICINE
Payer: COMMERCIAL

## 2022-09-13 ENCOUNTER — LAB VISIT (OUTPATIENT)
Dept: PRIMARY CARE CLINIC | Facility: CLINIC | Age: 62
End: 2022-09-13
Payer: COMMERCIAL

## 2022-09-13 ENCOUNTER — TELEPHONE (OUTPATIENT)
Dept: ELECTROPHYSIOLOGY | Facility: CLINIC | Age: 62
End: 2022-09-13
Payer: COMMERCIAL

## 2022-09-13 ENCOUNTER — PATIENT MESSAGE (OUTPATIENT)
Dept: ADMINISTRATIVE | Facility: OTHER | Age: 62
End: 2022-09-13
Payer: COMMERCIAL

## 2022-09-13 VITALS — HEIGHT: 63 IN | BODY MASS INDEX: 51.91 KG/M2 | WEIGHT: 293 LBS

## 2022-09-13 DIAGNOSIS — R00.1 BRADYCARDIA ON ECG: ICD-10-CM

## 2022-09-13 DIAGNOSIS — R00.1 BRADYCARDIA ON ECG: Primary | ICD-10-CM

## 2022-09-13 LAB
ASCENDING AORTA: 3.5 CM
AV INDEX (PROSTH): 0.61
AV MEAN GRADIENT: 13 MMHG
AV PEAK GRADIENT: 26 MMHG
AV VALVE AREA: 2.17 CM2
AV VELOCITY RATIO: 0.6
BSA FOR ECHO PROCEDURE: 2.49 M2
CV ECHO LV RWT: 0.39 CM
DOP CALC AO PEAK VEL: 2.56 M/S
DOP CALC AO VTI: 60.1 CM
DOP CALC LVOT AREA: 3.6 CM2
DOP CALC LVOT DIAMETER: 2.13 CM
DOP CALC LVOT PEAK VEL: 1.53 M/S
DOP CALC LVOT STROKE VOLUME: 130.71 CM3
DOP CALCLVOT PEAK VEL VTI: 36.7 CM
E WAVE DECELERATION TIME: 219.86 MSEC
E/A RATIO: 1.01
E/E' RATIO: 9.33 M/S
ECHO LV POSTERIOR WALL: 1.14 CM (ref 0.6–1.1)
EJECTION FRACTION: 62 %
ERYTHROCYTE [DISTWIDTH] IN BLOOD BY AUTOMATED COUNT: 15.2 % (ref 11.5–14.5)
FRACTIONAL SHORTENING: 34 % (ref 28–44)
HCT VFR BLD AUTO: 40.6 % (ref 37–48.5)
HGB BLD-MCNC: 13.4 G/DL (ref 12–16)
INTERVENTRICULAR SEPTUM: 0.96 CM (ref 0.6–1.1)
IVC DIAMETER: 1.79 CM
IVRT: 108.47 MSEC
LA MAJOR: 6.98 CM
LA MINOR: 7.02 CM
LA WIDTH: 3.8 CM
LEFT ATRIUM SIZE: 4.63 CM
LEFT ATRIUM VOLUME INDEX MOD: 27.7 ML/M2
LEFT ATRIUM VOLUME INDEX: 45.1 ML/M2
LEFT ATRIUM VOLUME MOD: 64.18 CM3
LEFT ATRIUM VOLUME: 104.68 CM3
LEFT INTERNAL DIMENSION IN SYSTOLE: 3.89 CM (ref 2.1–4)
LEFT VENTRICLE DIASTOLIC VOLUME INDEX: 75.1 ML/M2
LEFT VENTRICLE DIASTOLIC VOLUME: 174.23 ML
LEFT VENTRICLE MASS INDEX: 111 G/M2
LEFT VENTRICLE SYSTOLIC VOLUME INDEX: 28.3 ML/M2
LEFT VENTRICLE SYSTOLIC VOLUME: 65.58 ML
LEFT VENTRICULAR INTERNAL DIMENSION IN DIASTOLE: 5.92 CM (ref 3.5–6)
LEFT VENTRICULAR MASS: 257.17 G
LV LATERAL E/E' RATIO: 8.4 M/S
LV SEPTAL E/E' RATIO: 10.5 M/S
LVOT MG: 4.43 MMHG
LVOT MV: 0.98 CM/S
MCH RBC QN AUTO: 30.4 PG (ref 27–31)
MCHC RBC AUTO-ENTMCNC: 33 G/DL (ref 32–36)
MCV RBC AUTO: 92 FL (ref 82–98)
MV PEAK A VEL: 0.83 M/S
MV PEAK E VEL: 0.84 M/S
MV STENOSIS PRESSURE HALF TIME: 63.76 MS
MV VALVE AREA P 1/2 METHOD: 3.45 CM2
PISA TR MAX VEL: 2.59 M/S
PLATELET # BLD AUTO: 254 K/UL (ref 150–450)
PMV BLD AUTO: 12.3 FL (ref 9.2–12.9)
PULM VEIN S/D RATIO: 0.82
PV PEAK D VEL: 0.57 M/S
PV PEAK S VEL: 0.47 M/S
PV PEAK VELOCITY: 1.5 CM/S
RA MAJOR: 5.19 CM
RA PRESSURE: 3 MMHG
RA WIDTH: 4 CM
RBC # BLD AUTO: 4.41 M/UL (ref 4–5.4)
RIGHT VENTRICULAR END-DIASTOLIC DIMENSION: 3.6 CM
RV TISSUE DOPPLER FREE WALL SYSTOLIC VELOCITY 1 (APICAL 4 CHAMBER VIEW): 13 CM/S
SINUS: 3 CM
STJ: 2.22 CM
TDI LATERAL: 0.1 M/S
TDI SEPTAL: 0.08 M/S
TDI: 0.09 M/S
TR MAX PG: 27 MMHG
TRICUSPID ANNULAR PLANE SYSTOLIC EXCURSION: 2.9 CM
TV REST PULMONARY ARTERY PRESSURE: 30 MMHG
WBC # BLD AUTO: 6.81 K/UL (ref 3.9–12.7)

## 2022-09-13 PROCEDURE — 85027 COMPLETE CBC AUTOMATED: CPT | Performed by: INTERNAL MEDICINE

## 2022-09-13 PROCEDURE — 93306 ECHO (CUPID ONLY): ICD-10-PCS | Mod: 26,,, | Performed by: INTERNAL MEDICINE

## 2022-09-13 PROCEDURE — 93306 TTE W/DOPPLER COMPLETE: CPT | Mod: 26,,, | Performed by: INTERNAL MEDICINE

## 2022-09-13 PROCEDURE — 93306 TTE W/DOPPLER COMPLETE: CPT | Mod: PN

## 2022-09-13 NOTE — TELEPHONE ENCOUNTER
Patient scheduled to see Dr. Andrade in clinic tomorrow. I called the patient to schedule an EKG prior to visit. Patient agreed to EKG at 8:15am. Appointment scheduled.

## 2022-09-14 ENCOUNTER — OFFICE VISIT (OUTPATIENT)
Dept: ELECTROPHYSIOLOGY | Facility: CLINIC | Age: 62
End: 2022-09-14
Payer: COMMERCIAL

## 2022-09-14 ENCOUNTER — HOSPITAL ENCOUNTER (OUTPATIENT)
Dept: CARDIOLOGY | Facility: CLINIC | Age: 62
Discharge: HOME | End: 2022-09-14
Payer: COMMERCIAL

## 2022-09-14 VITALS
HEART RATE: 39 BPM | BODY MASS INDEX: 51.91 KG/M2 | SYSTOLIC BLOOD PRESSURE: 140 MMHG | WEIGHT: 293 LBS | HEIGHT: 63 IN | DIASTOLIC BLOOD PRESSURE: 63 MMHG

## 2022-09-14 DIAGNOSIS — R00.1 BRADYCARDIA ON ECG: Primary | ICD-10-CM

## 2022-09-14 DIAGNOSIS — I47.19 ATRIAL TACHYCARDIA: ICD-10-CM

## 2022-09-14 DIAGNOSIS — I10 ESSENTIAL HYPERTENSION: ICD-10-CM

## 2022-09-14 DIAGNOSIS — E66.01 MORBID OBESITY: ICD-10-CM

## 2022-09-14 DIAGNOSIS — R00.1 BRADYCARDIA ON ECG: ICD-10-CM

## 2022-09-14 PROCEDURE — 93005 ELECTROCARDIOGRAM TRACING: CPT | Mod: S$GLB,,, | Performed by: INTERNAL MEDICINE

## 2022-09-14 PROCEDURE — 1159F PR MEDICATION LIST DOCUMENTED IN MEDICAL RECORD: ICD-10-PCS | Mod: CPTII,S$GLB,, | Performed by: INTERNAL MEDICINE

## 2022-09-14 PROCEDURE — 99204 OFFICE O/P NEW MOD 45 MIN: CPT | Mod: S$GLB,,, | Performed by: INTERNAL MEDICINE

## 2022-09-14 PROCEDURE — 3008F PR BODY MASS INDEX (BMI) DOCUMENTED: ICD-10-PCS | Mod: CPTII,S$GLB,, | Performed by: INTERNAL MEDICINE

## 2022-09-14 PROCEDURE — 99999 PR PBB SHADOW E&M-EST. PATIENT-LVL IV: ICD-10-PCS | Mod: PBBFAC,,, | Performed by: INTERNAL MEDICINE

## 2022-09-14 PROCEDURE — 93010 ELECTROCARDIOGRAM REPORT: CPT | Mod: S$GLB,,, | Performed by: INTERNAL MEDICINE

## 2022-09-14 PROCEDURE — 93010 RHYTHM STRIP: ICD-10-PCS | Mod: S$GLB,,, | Performed by: INTERNAL MEDICINE

## 2022-09-14 PROCEDURE — 3078F PR MOST RECENT DIASTOLIC BLOOD PRESSURE < 80 MM HG: ICD-10-PCS | Mod: CPTII,S$GLB,, | Performed by: INTERNAL MEDICINE

## 2022-09-14 PROCEDURE — 3077F PR MOST RECENT SYSTOLIC BLOOD PRESSURE >= 140 MM HG: ICD-10-PCS | Mod: CPTII,S$GLB,, | Performed by: INTERNAL MEDICINE

## 2022-09-14 PROCEDURE — 3008F BODY MASS INDEX DOCD: CPT | Mod: CPTII,S$GLB,, | Performed by: INTERNAL MEDICINE

## 2022-09-14 PROCEDURE — 1160F RVW MEDS BY RX/DR IN RCRD: CPT | Mod: CPTII,S$GLB,, | Performed by: INTERNAL MEDICINE

## 2022-09-14 PROCEDURE — 99999 PR PBB SHADOW E&M-EST. PATIENT-LVL IV: CPT | Mod: PBBFAC,,, | Performed by: INTERNAL MEDICINE

## 2022-09-14 PROCEDURE — 3077F SYST BP >= 140 MM HG: CPT | Mod: CPTII,S$GLB,, | Performed by: INTERNAL MEDICINE

## 2022-09-14 PROCEDURE — 1160F PR REVIEW ALL MEDS BY PRESCRIBER/CLIN PHARMACIST DOCUMENTED: ICD-10-PCS | Mod: CPTII,S$GLB,, | Performed by: INTERNAL MEDICINE

## 2022-09-14 PROCEDURE — 93005 RHYTHM STRIP: ICD-10-PCS | Mod: S$GLB,,, | Performed by: INTERNAL MEDICINE

## 2022-09-14 PROCEDURE — 3078F DIAST BP <80 MM HG: CPT | Mod: CPTII,S$GLB,, | Performed by: INTERNAL MEDICINE

## 2022-09-14 PROCEDURE — 99204 PR OFFICE/OUTPT VISIT, NEW, LEVL IV, 45-59 MIN: ICD-10-PCS | Mod: S$GLB,,, | Performed by: INTERNAL MEDICINE

## 2022-09-14 PROCEDURE — 1159F MED LIST DOCD IN RCRD: CPT | Mod: CPTII,S$GLB,, | Performed by: INTERNAL MEDICINE

## 2022-09-14 NOTE — PROGRESS NOTES
Subjective:    Patient ID:  Roxane Junior is a 62 y.o. female who presents for evaluation of Palpitations    Referring Cardiologist: Montana Koehler MD  Primary Care Physician: Dinesh Pacheco MD    HPI  I had the pleasure of seeing Mrs. Junior in our electrophysiology clinic in consultation for her arrhythmias. As you are aware she is a pleasant 62 year-old woman with hypertension, morbid obesity and bradycardia. She was referred to Dr. Koehler in 2021 for evaluation of bradycardia. She wore a holter monitor in July of 2021 which noted an average sinus rate of 41 bpm. There were periods during day and night time where her rate was in the 30s. She did have frequent PACs. She reports her heart rate has been like this for many years. She denies any fatigue, near syncope, or syncope. Since that visit she reports noting an episode of heart racing lasting 5 minutes in July of 2022. A 30 day event monitor was ordered which noted periods of bradycardia and an episode of long RP narrow complex tachycardia with a rate of 150 bpm (incorrectly read as atrial fibrillation by the monitoring company). This occurred at midnight on 9/9/2022. She was asleep.    7/2021: holter noted sinus bradycardia with frequent PACs (8% burden). Average rate 41 bpm.    9/2022: ECHO noted preserved LV function.    I reviewed available electrocardiograms in Epic which show sinus bradycardia.    My interpretation of today's in clinic ECG is sinus bradycardia with a rate of 39 bpm.    Review of Systems   Constitutional: Negative for fever and malaise/fatigue.   HENT:  Negative for congestion and sore throat.    Eyes:  Negative for blurred vision and visual disturbance.   Cardiovascular:  Positive for palpitations. Negative for chest pain, dyspnea on exertion, irregular heartbeat, near-syncope and syncope.   Respiratory:  Negative for cough and shortness of breath.    Hematologic/Lymphatic: Negative for bleeding problem. Does not bruise/bleed easily.    Skin: Negative.    Musculoskeletal: Negative.    Gastrointestinal:  Negative for bloating, abdominal pain, hematochezia and melena.   Neurological:  Negative for focal weakness and weakness.   Psychiatric/Behavioral: Negative.        Objective:    Physical Exam  Vitals reviewed.   Constitutional:       General: She is not in acute distress.     Appearance: She is well-developed. She is not diaphoretic.   HENT:      Head: Normocephalic and atraumatic.   Eyes:      General:         Right eye: No discharge.         Left eye: No discharge.      Conjunctiva/sclera: Conjunctivae normal.   Cardiovascular:      Rate and Rhythm: Normal rate and regular rhythm. Occasional Extrasystoles are present.     Heart sounds: No murmur heard.    No friction rub. No gallop.   Pulmonary:      Effort: Pulmonary effort is normal. No respiratory distress.      Breath sounds: Normal breath sounds. No wheezing or rales.   Abdominal:      General: Bowel sounds are normal. There is no distension.      Palpations: Abdomen is soft.      Tenderness: There is no abdominal tenderness.   Musculoskeletal:      Cervical back: Neck supple.   Skin:     General: Skin is warm and dry.   Neurological:      Mental Status: She is alert and oriented to person, place, and time.   Psychiatric:         Behavior: Behavior normal.         Thought Content: Thought content normal.         Judgment: Judgment normal.         Assessment:       1. Bradycardia on ECG    2. Essential hypertension    3. Morbid obesity    4. Atrial tachycardia         Plan:       In summary, Mrs. Junior is a pleasant 62 year-old woman with hypertension, morbid obesity, asymptomatic sinus bradycardia, and paroxysmal tachycardia. Her episode observed on her monitor is likely an atrial tachycardia. Discussed this could be what she felt back in July of 2022. No AF observed to date. Discussed the problematic nature of treating AT in setting of chronic asymptomatic sinus bradycardia. With just  1 symptomatic episode a few months ago, opt for continued watchful monitoring. She will complete her monitor in 3 weeks. Should she begin to have frequent symptomatic episodes the treatment options would be EPS/ablation or attempt drug therapy which may require PPM implantation.    RTC in 6-8 weeks.    Thank you for allowing me to participate in the care of this patient. Please do not hesitate to call me with any questions or concerns.    Prabhu Andrade MD, PhD  Cardiac Electrophysiology

## 2022-09-15 ENCOUNTER — TELEPHONE (OUTPATIENT)
Dept: CARDIOLOGY | Facility: HOSPITAL | Age: 62
End: 2022-09-15
Payer: COMMERCIAL

## 2022-09-15 NOTE — TELEPHONE ENCOUNTER
Patient wearing 30 day event monitor for diagnosis bradycardia with PACs and atrial couplets     Received auto-triggered alert notification for bradycardia on September 14, 2022 at 11: 34 PM, September 15, 2022 at 2:12 AM & 5:30 AM    Pt was seen in arrhythmia clinic yesterday, 9/14/22. Pt has hx of asymptomatic bradycardia and is scheduled to RTC on 11/22/22.     Called patient to inquire about regular sleep hours and symptoms. Answered by automated message stating customer was unavailable and unable to leave a message.    Strips placed under this encounter for review. Message sent to ordering provider. Will continue to monitor until  10/7/22.

## 2022-09-16 ENCOUNTER — TELEPHONE (OUTPATIENT)
Dept: CARDIOLOGY | Facility: HOSPITAL | Age: 62
End: 2022-09-16
Payer: COMMERCIAL

## 2022-09-19 ENCOUNTER — TELEPHONE (OUTPATIENT)
Dept: ELECTROPHYSIOLOGY | Facility: CLINIC | Age: 62
End: 2022-09-19
Payer: COMMERCIAL

## 2022-09-19 NOTE — TELEPHONE ENCOUNTER
Patient wearing 30 day event monitor for diagnosis bradycardia     Received auto-triggered alert notification for bradycardia on September 17, 2022 at 9: 42 AM     Called patient to assess for symptoms, however patient not available and unable to leave . Patient has a history of asymptomatic SB.     Strips placed under this encounter for review. Message sent to ordering provider. Will continue to monitor until 10/7/22.

## 2022-09-28 ENCOUNTER — LAB VISIT (OUTPATIENT)
Dept: PRIMARY CARE CLINIC | Facility: CLINIC | Age: 62
End: 2022-09-28
Payer: COMMERCIAL

## 2022-09-28 ENCOUNTER — OFFICE VISIT (OUTPATIENT)
Dept: CARDIOLOGY | Facility: CLINIC | Age: 62
End: 2022-09-28
Payer: COMMERCIAL

## 2022-09-28 VITALS
WEIGHT: 293 LBS | BODY MASS INDEX: 53.27 KG/M2 | DIASTOLIC BLOOD PRESSURE: 74 MMHG | OXYGEN SATURATION: 98 % | HEART RATE: 40 BPM | SYSTOLIC BLOOD PRESSURE: 126 MMHG

## 2022-09-28 DIAGNOSIS — R00.1 BRADYCARDIA ON ECG: ICD-10-CM

## 2022-09-28 DIAGNOSIS — I10 ESSENTIAL HYPERTENSION: ICD-10-CM

## 2022-09-28 DIAGNOSIS — I47.19 ATRIAL TACHYCARDIA: Primary | ICD-10-CM

## 2022-09-28 LAB
ALBUMIN SERPL BCP-MCNC: 3.9 G/DL (ref 3.5–5.2)
ALP SERPL-CCNC: 69 U/L (ref 55–135)
ALT SERPL W/O P-5'-P-CCNC: 11 U/L (ref 10–44)
ANION GAP SERPL CALC-SCNC: 13 MMOL/L (ref 8–16)
AST SERPL-CCNC: 17 U/L (ref 10–40)
BILIRUB SERPL-MCNC: 0.5 MG/DL (ref 0.1–1)
BUN SERPL-MCNC: 12 MG/DL (ref 8–23)
CALCIUM SERPL-MCNC: 10.1 MG/DL (ref 8.7–10.5)
CHLORIDE SERPL-SCNC: 105 MMOL/L (ref 95–110)
CO2 SERPL-SCNC: 25 MMOL/L (ref 23–29)
CREAT SERPL-MCNC: 1 MG/DL (ref 0.5–1.4)
EST. GFR  (NO RACE VARIABLE): >60 ML/MIN/1.73 M^2
GLUCOSE SERPL-MCNC: 91 MG/DL (ref 70–110)
POTASSIUM SERPL-SCNC: 4.2 MMOL/L (ref 3.5–5.1)
PROT SERPL-MCNC: 7.6 G/DL (ref 6–8.4)
SODIUM SERPL-SCNC: 143 MMOL/L (ref 136–145)

## 2022-09-28 PROCEDURE — 3078F PR MOST RECENT DIASTOLIC BLOOD PRESSURE < 80 MM HG: ICD-10-PCS | Mod: CPTII,S$GLB,, | Performed by: INTERNAL MEDICINE

## 2022-09-28 PROCEDURE — 3078F DIAST BP <80 MM HG: CPT | Mod: CPTII,S$GLB,, | Performed by: INTERNAL MEDICINE

## 2022-09-28 PROCEDURE — 3008F BODY MASS INDEX DOCD: CPT | Mod: CPTII,S$GLB,, | Performed by: INTERNAL MEDICINE

## 2022-09-28 PROCEDURE — 99999 PR PBB SHADOW E&M-EST. PATIENT-LVL II: ICD-10-PCS | Mod: PBBFAC,,, | Performed by: INTERNAL MEDICINE

## 2022-09-28 PROCEDURE — 3008F PR BODY MASS INDEX (BMI) DOCUMENTED: ICD-10-PCS | Mod: CPTII,S$GLB,, | Performed by: INTERNAL MEDICINE

## 2022-09-28 PROCEDURE — 80053 COMPREHEN METABOLIC PANEL: CPT | Performed by: INTERNAL MEDICINE

## 2022-09-28 PROCEDURE — 99214 PR OFFICE/OUTPT VISIT, EST, LEVL IV, 30-39 MIN: ICD-10-PCS | Mod: S$GLB,,, | Performed by: INTERNAL MEDICINE

## 2022-09-28 PROCEDURE — 1159F MED LIST DOCD IN RCRD: CPT | Mod: CPTII,S$GLB,, | Performed by: INTERNAL MEDICINE

## 2022-09-28 PROCEDURE — 99999 PR PBB SHADOW E&M-EST. PATIENT-LVL II: CPT | Mod: PBBFAC,,, | Performed by: INTERNAL MEDICINE

## 2022-09-28 PROCEDURE — 3074F SYST BP LT 130 MM HG: CPT | Mod: CPTII,S$GLB,, | Performed by: INTERNAL MEDICINE

## 2022-09-28 PROCEDURE — 1159F PR MEDICATION LIST DOCUMENTED IN MEDICAL RECORD: ICD-10-PCS | Mod: CPTII,S$GLB,, | Performed by: INTERNAL MEDICINE

## 2022-09-28 PROCEDURE — 3074F PR MOST RECENT SYSTOLIC BLOOD PRESSURE < 130 MM HG: ICD-10-PCS | Mod: CPTII,S$GLB,, | Performed by: INTERNAL MEDICINE

## 2022-09-28 PROCEDURE — 99214 OFFICE O/P EST MOD 30 MIN: CPT | Mod: S$GLB,,, | Performed by: INTERNAL MEDICINE

## 2022-09-28 NOTE — PROGRESS NOTES
Cardiology    9/28/2022  2:35 PM    Problem list  Patient Active Problem List   Diagnosis    Right knee injury    Bradycardia on ECG    Essential hypertension    Hyperthyroidism    BMI 50.0-59.9, adult    Morbid obesity    Atrial tachycardia       CC:  F/u    HPI:  She has been doing well.  She denies any chest pain, shortness of breath, palpitation, syncope.  She has been wearing her event monitor for 20 days.  She remains active.  She is scheduled to see Dr. Andrade (EP) for f/u in a few weeks.  Discussed her CBC results.  CMP was not done due to hemolyzed specimen.    Medications  Current Outpatient Medications   Medication Sig Dispense Refill    amLODIPine (NORVASC) 10 MG tablet Take 10 mg by mouth once daily.      montelukast (SINGULAIR) 10 mg tablet Take 10 mg by mouth once daily. PRN      triamterene-hydrochlorothiazide 37.5-25 mg (MAXZIDE-25) 37.5-25 mg per tablet Take 1 tablet by mouth once daily.       No current facility-administered medications for this visit.      Prior to Admission medications    Medication Sig Start Date End Date Taking? Authorizing Provider   amLODIPine (NORVASC) 10 MG tablet Take 10 mg by mouth once daily. 4/15/21  Yes Historical Provider   montelukast (SINGULAIR) 10 mg tablet Take 10 mg by mouth once daily. PRN 5/19/21  Yes Historical Provider   triamterene-hydrochlorothiazide 37.5-25 mg (MAXZIDE-25) 37.5-25 mg per tablet Take 1 tablet by mouth once daily.   Yes Historical Provider         History  Past Medical History:   Diagnosis Date    Hypertension     Hyperthyroidism      No past surgical history on file.  Social History     Socioeconomic History    Marital status: Single   Tobacco Use    Smoking status: Never    Smokeless tobacco: Never   Substance and Sexual Activity    Alcohol use: Yes     Comment: occ    Drug use: Never         Allergies  Review of patient's allergies indicates:  No Known Allergies      Review of Systems   Review of Systems   Constitutional:  Negative for decreased appetite, fever and weight loss.   HENT:  Negative for congestion and nosebleeds.    Eyes:  Negative for double vision, vision loss in left eye, vision loss in right eye and visual disturbance.   Cardiovascular:  Negative for chest pain, claudication, cyanosis, dyspnea on exertion, irregular heartbeat, leg swelling, near-syncope, orthopnea, palpitations, paroxysmal nocturnal dyspnea and syncope.   Respiratory:  Negative for cough, hemoptysis, shortness of breath, sleep disturbances due to breathing, snoring, sputum production and wheezing.    Endocrine: Negative for cold intolerance and heat intolerance.   Skin:  Negative for nail changes and rash.   Musculoskeletal:  Negative for joint pain, muscle cramps, muscle weakness and myalgias.   Gastrointestinal:  Negative for change in bowel habit, heartburn, hematemesis, hematochezia, hemorrhoids and melena.   Neurological:  Negative for dizziness, focal weakness and headaches.       Physical Exam  Wt Readings from Last 1 Encounters:   09/28/22 (!) 136.4 kg (300 lb 11.3 oz)     BP Readings from Last 3 Encounters:   09/28/22 126/74   09/14/22 (!) 140/63   09/01/22 118/66     Pulse Readings from Last 1 Encounters:   09/28/22 (!) 40     Body mass index is 53.27 kg/m².    Physical Exam  Vitals reviewed.   Constitutional:       Appearance: She is well-developed. She is obese.   HENT:      Head: Atraumatic.   Eyes:      General: No scleral icterus.  Neck:      Vascular: Normal carotid pulses. No carotid bruit, hepatojugular reflux or JVD.   Cardiovascular:      Rate and Rhythm: Normal rate and regular rhythm.      Chest Wall: PMI is not displaced.      Pulses: Intact distal pulses.           Carotid pulses are 2+ on the right side and 2+ on the left side.       Radial pulses are 2+ on the right side and 2+ on the left side.        Dorsalis pedis pulses are 2+ on the right side and 2+ on the left side.      Heart sounds: S1 normal and S2 normal. Murmur  heard.   Early systolic murmur is present with a grade of 2/6 at the upper right sternal border.     No friction rub.   Pulmonary:      Effort: Pulmonary effort is normal. No respiratory distress.      Breath sounds: Normal breath sounds. No stridor. No wheezing or rales.   Chest:      Chest wall: No tenderness.   Abdominal:      General: Bowel sounds are normal.      Palpations: Abdomen is soft.   Musculoskeletal:      Cervical back: Neck supple. No edema.      Right lower leg: No edema.      Left lower leg: No edema.   Skin:     General: Skin is warm and dry.      Nails: There is no clubbing.   Neurological:      Mental Status: She is alert and oriented to person, place, and time.   Psychiatric:         Behavior: Behavior normal.         Thought Content: Thought content normal.           Assessment  1. Atrial tachycardia  Unchanged.  Evaluated by Dr Andrade.    2. Essential hypertension  controlled    3. Bradycardia on ECG  stable  - Comprehensive Metabolic Panel; Future    4. BMI 50.0-59.9, adult  unchanged        Plan and Discussion  Continue current BP meds.  Will wait for event monitor results.  Keep f/u appt with Dr Andrade.    Follow Up  2 months      Montana Koehler MD, F.A.C.C, F.S.C.A.I.

## 2022-09-29 ENCOUNTER — TELEPHONE (OUTPATIENT)
Dept: CARDIOLOGY | Facility: CLINIC | Age: 62
End: 2022-09-29
Payer: COMMERCIAL

## 2022-09-29 NOTE — TELEPHONE ENCOUNTER
----- Message from Rhona Townsend RN sent at 9/28/2022  3:25 PM CDT -----  Check CMP and call pt with results

## 2022-11-10 DIAGNOSIS — R00.1 BRADYCARDIA ON ECG: Primary | ICD-10-CM

## 2022-11-22 ENCOUNTER — OFFICE VISIT (OUTPATIENT)
Dept: ELECTROPHYSIOLOGY | Facility: CLINIC | Age: 62
End: 2022-11-22
Payer: COMMERCIAL

## 2022-11-22 ENCOUNTER — HOSPITAL ENCOUNTER (OUTPATIENT)
Dept: CARDIOLOGY | Facility: CLINIC | Age: 62
Discharge: HOME | End: 2022-11-22
Payer: COMMERCIAL

## 2022-11-22 VITALS
BODY MASS INDEX: 51.91 KG/M2 | DIASTOLIC BLOOD PRESSURE: 81 MMHG | WEIGHT: 293 LBS | SYSTOLIC BLOOD PRESSURE: 128 MMHG | HEIGHT: 63 IN | HEART RATE: 123 BPM

## 2022-11-22 DIAGNOSIS — R00.1 BRADYCARDIA ON ECG: ICD-10-CM

## 2022-11-22 DIAGNOSIS — E66.01 MORBID OBESITY: ICD-10-CM

## 2022-11-22 DIAGNOSIS — I10 ESSENTIAL HYPERTENSION: ICD-10-CM

## 2022-11-22 DIAGNOSIS — I48.91 NEW ONSET ATRIAL FIBRILLATION: ICD-10-CM

## 2022-11-22 DIAGNOSIS — I47.19 ATRIAL TACHYCARDIA: Primary | ICD-10-CM

## 2022-11-22 PROCEDURE — 99214 PR OFFICE/OUTPT VISIT, EST, LEVL IV, 30-39 MIN: ICD-10-PCS | Mod: S$GLB,,, | Performed by: INTERNAL MEDICINE

## 2022-11-22 PROCEDURE — 1160F PR REVIEW ALL MEDS BY PRESCRIBER/CLIN PHARMACIST DOCUMENTED: ICD-10-PCS | Mod: CPTII,S$GLB,, | Performed by: INTERNAL MEDICINE

## 2022-11-22 PROCEDURE — 99999 PR PBB SHADOW E&M-EST. PATIENT-LVL III: CPT | Mod: PBBFAC,,, | Performed by: INTERNAL MEDICINE

## 2022-11-22 PROCEDURE — 3074F PR MOST RECENT SYSTOLIC BLOOD PRESSURE < 130 MM HG: ICD-10-PCS | Mod: CPTII,S$GLB,, | Performed by: INTERNAL MEDICINE

## 2022-11-22 PROCEDURE — 3079F DIAST BP 80-89 MM HG: CPT | Mod: CPTII,S$GLB,, | Performed by: INTERNAL MEDICINE

## 2022-11-22 PROCEDURE — 3079F PR MOST RECENT DIASTOLIC BLOOD PRESSURE 80-89 MM HG: ICD-10-PCS | Mod: CPTII,S$GLB,, | Performed by: INTERNAL MEDICINE

## 2022-11-22 PROCEDURE — 93005 RHYTHM STRIP: ICD-10-PCS | Mod: S$GLB,,, | Performed by: INTERNAL MEDICINE

## 2022-11-22 PROCEDURE — 3074F SYST BP LT 130 MM HG: CPT | Mod: CPTII,S$GLB,, | Performed by: INTERNAL MEDICINE

## 2022-11-22 PROCEDURE — 93005 ELECTROCARDIOGRAM TRACING: CPT | Mod: S$GLB,,, | Performed by: INTERNAL MEDICINE

## 2022-11-22 PROCEDURE — 99999 PR PBB SHADOW E&M-EST. PATIENT-LVL III: ICD-10-PCS | Mod: PBBFAC,,, | Performed by: INTERNAL MEDICINE

## 2022-11-22 PROCEDURE — 3008F PR BODY MASS INDEX (BMI) DOCUMENTED: ICD-10-PCS | Mod: CPTII,S$GLB,, | Performed by: INTERNAL MEDICINE

## 2022-11-22 PROCEDURE — 99214 OFFICE O/P EST MOD 30 MIN: CPT | Mod: S$GLB,,, | Performed by: INTERNAL MEDICINE

## 2022-11-22 PROCEDURE — 1159F MED LIST DOCD IN RCRD: CPT | Mod: CPTII,S$GLB,, | Performed by: INTERNAL MEDICINE

## 2022-11-22 PROCEDURE — 1159F PR MEDICATION LIST DOCUMENTED IN MEDICAL RECORD: ICD-10-PCS | Mod: CPTII,S$GLB,, | Performed by: INTERNAL MEDICINE

## 2022-11-22 PROCEDURE — 93010 ELECTROCARDIOGRAM REPORT: CPT | Mod: S$GLB,,, | Performed by: INTERNAL MEDICINE

## 2022-11-22 PROCEDURE — 3008F BODY MASS INDEX DOCD: CPT | Mod: CPTII,S$GLB,, | Performed by: INTERNAL MEDICINE

## 2022-11-22 PROCEDURE — 93010 RHYTHM STRIP: ICD-10-PCS | Mod: S$GLB,,, | Performed by: INTERNAL MEDICINE

## 2022-11-22 PROCEDURE — 1160F RVW MEDS BY RX/DR IN RCRD: CPT | Mod: CPTII,S$GLB,, | Performed by: INTERNAL MEDICINE

## 2022-11-22 NOTE — PROGRESS NOTES
Subjective:    Patient ID:  Roxane Junior is a 62 y.o. female who presents for evaluation of Palpitations    Referring Cardiologist: Montana Koehler MD  Primary Care Physician: Dinesh Pacheco MD    HPI  Prior Hx:  I had the pleasure of seeing Mrs. Junior in our electrophysiology clinic in follow-up for her arrhythmias. As you are aware she is a pleasant 62 year-old woman with hypertension, morbid obesity and bradycardia. She was referred to Dr. Koehler in 2021 for evaluation of bradycardia. She wore a holter monitor in July of 2021 which noted an average sinus rate of 41 bpm. There were periods during day and night time where her rate was in the 30s. She did have frequent PACs. She reports her heart rate has been like this for many years. She denies any fatigue, near syncope, or syncope. Since that visit she reported noting an episode of heart racing lasting 5 minutes in July of 2022. A 30 day event monitor was ordered which noted periods of bradycardia and an episode of long RP narrow complex tachycardia with a rate of 150 bpm (incorrectly read as atrial fibrillation by the monitoring company). This occurred at midnight on 9/9/2022. She was asleep. We discussed that should she begin to have frequent symptomatic episodes the treatment options would be EPS/ablation or attempt drug therapy which may require PPM implantation.    7/2021: holter noted sinus bradycardia with frequent PACs (8% burden). Average rate 41 bpm.    9/2022: ECHO noted preserved LV function.    I reviewed available electrocardiograms in Epic which show sinus bradycardia.    Interim Hx:  Mrs. Junior returns for follow-up. 30 day monitor noted a single episode of tachycardia with rate at 150 bpm. Initially suspected possible AFL with 2:1 AV conduction however re-evaluation noted a gradual slowing towards the end with the relationship being a long RP tachycardia. More likely this was an AT. Today she is in atrial fibrillation. Reports  palpitations for the past few days.    My interpretation of today's in clinic ECG is atrial fibrillation with an average ventricular rate of 123 bpm.    Review of Systems   Constitutional: Negative for fever and malaise/fatigue.   HENT:  Negative for congestion and sore throat.    Eyes:  Negative for blurred vision and visual disturbance.   Cardiovascular:  Positive for palpitations. Negative for chest pain, dyspnea on exertion, irregular heartbeat, near-syncope and syncope.   Respiratory:  Negative for cough and shortness of breath.    Hematologic/Lymphatic: Negative for bleeding problem. Does not bruise/bleed easily.   Skin: Negative.    Musculoskeletal: Negative.    Gastrointestinal:  Negative for bloating, abdominal pain, hematochezia and melena.   Neurological:  Negative for focal weakness and weakness.   Psychiatric/Behavioral: Negative.        Objective:    Physical Exam  Vitals reviewed.   Constitutional:       General: She is not in acute distress.     Appearance: She is well-developed. She is not diaphoretic.   HENT:      Head: Normocephalic and atraumatic.   Eyes:      General:         Right eye: No discharge.         Left eye: No discharge.      Conjunctiva/sclera: Conjunctivae normal.   Cardiovascular:      Rate and Rhythm: Tachycardia present. Rhythm irregularly irregular.      Heart sounds: No murmur heard.    No friction rub. No gallop.      Comments: On pulse check later during the visit her pulse was regular with a slow rate with occasional ectopy.  Pulmonary:      Effort: Pulmonary effort is normal. No respiratory distress.      Breath sounds: Normal breath sounds. No wheezing or rales.   Abdominal:      General: Bowel sounds are normal. There is no distension.      Palpations: Abdomen is soft.      Tenderness: There is no abdominal tenderness.   Musculoskeletal:      Cervical back: Neck supple.   Skin:     General: Skin is warm and dry.   Neurological:      Mental Status: She is alert and  oriented to person, place, and time.   Psychiatric:         Behavior: Behavior normal.         Thought Content: Thought content normal.         Judgment: Judgment normal.         Assessment:       1. Atrial tachycardia    2. Essential hypertension    3. Morbid obesity    4. New onset atrial fibrillation         Plan:       In summary, Mrs. Junior is a pleasant 62 year-old woman with hypertension, morbid obesity, asymptomatic sinus bradycardia, and paroxysmal tachycardia now diagnosed with atrial fibrillation with RVR. Suspect paroxysmal. I had a long discussion with the patient about the pathophysiology and risks of atrial fibrillation and its basic pathophysiology, including its health implications and treatment options. Specifically, I addressed the need for CVA (stroke) prophylaxis with aspirin versus oral anticoagulation (warfarin vs DOACs, discussed bleeding risks, and need to come to the ER for any head trauma for CT scanning even if asymptomatic). Her [id number] score is 2 and anticoagulation is currently recommended. She will start eliquis. I also discussed the goal to reduce symptomatic arrhythmic episodes by pharmacologic and/or procedural methods and utilizing a rhythm versus a rate control strategy. Due to symptoms, RVR, and young age I recommend rhythm control. Discussed the problematic nature of symptomatic AF with RVR and asymptomatic sinus bradycardia. Prefer to avoid PPM just for anti-arrhythmic drug therapy. Discussed dofetilide versus PVI. She is open to try dofetilide. If she is intolerant to this or it doesn't work then would advocate for PVI.    Plan  Admit for dofetilide initiation  MIRIAM/DCCV if she presents in AF  Start eliquis 5mg bid.      Thank you for allowing me to participate in the care of this patient. Please do not hesitate to call me with any questions or concerns.    Prabhu Andrade MD, PhD  Cardiac Electrophysiology

## 2022-11-22 NOTE — H&P (VIEW-ONLY)
Subjective:    Patient ID:  Roxane Junior is a 62 y.o. female who presents for evaluation of Palpitations    Referring Cardiologist: Montana Koehler MD  Primary Care Physician: Dinesh Pacheco MD    HPI  Prior Hx:  I had the pleasure of seeing Mrs. Junior in our electrophysiology clinic in follow-up for her arrhythmias. As you are aware she is a pleasant 62 year-old woman with hypertension, morbid obesity and bradycardia. She was referred to Dr. Koehler in 2021 for evaluation of bradycardia. She wore a holter monitor in July of 2021 which noted an average sinus rate of 41 bpm. There were periods during day and night time where her rate was in the 30s. She did have frequent PACs. She reports her heart rate has been like this for many years. She denies any fatigue, near syncope, or syncope. Since that visit she reported noting an episode of heart racing lasting 5 minutes in July of 2022. A 30 day event monitor was ordered which noted periods of bradycardia and an episode of long RP narrow complex tachycardia with a rate of 150 bpm (incorrectly read as atrial fibrillation by the monitoring company). This occurred at midnight on 9/9/2022. She was asleep. We discussed that should she begin to have frequent symptomatic episodes the treatment options would be EPS/ablation or attempt drug therapy which may require PPM implantation.    7/2021: holter noted sinus bradycardia with frequent PACs (8% burden). Average rate 41 bpm.    9/2022: ECHO noted preserved LV function.    I reviewed available electrocardiograms in Epic which show sinus bradycardia.    Interim Hx:  Mrs. Junior returns for follow-up. 30 day monitor noted a single episode of tachycardia with rate at 150 bpm. Initially suspected possible AFL with 2:1 AV conduction however re-evaluation noted a gradual slowing towards the end with the relationship being a long RP tachycardia. More likely this was an AT. Today she is in atrial fibrillation. Reports  palpitations for the past few days.    My interpretation of today's in clinic ECG is atrial fibrillation with an average ventricular rate of 123 bpm.    Review of Systems   Constitutional: Negative for fever and malaise/fatigue.   HENT:  Negative for congestion and sore throat.    Eyes:  Negative for blurred vision and visual disturbance.   Cardiovascular:  Positive for palpitations. Negative for chest pain, dyspnea on exertion, irregular heartbeat, near-syncope and syncope.   Respiratory:  Negative for cough and shortness of breath.    Hematologic/Lymphatic: Negative for bleeding problem. Does not bruise/bleed easily.   Skin: Negative.    Musculoskeletal: Negative.    Gastrointestinal:  Negative for bloating, abdominal pain, hematochezia and melena.   Neurological:  Negative for focal weakness and weakness.   Psychiatric/Behavioral: Negative.        Objective:    Physical Exam  Vitals reviewed.   Constitutional:       General: She is not in acute distress.     Appearance: She is well-developed. She is not diaphoretic.   HENT:      Head: Normocephalic and atraumatic.   Eyes:      General:         Right eye: No discharge.         Left eye: No discharge.      Conjunctiva/sclera: Conjunctivae normal.   Cardiovascular:      Rate and Rhythm: Tachycardia present. Rhythm irregularly irregular.      Heart sounds: No murmur heard.    No friction rub. No gallop.      Comments: On pulse check later during the visit her pulse was regular with a slow rate with occasional ectopy.  Pulmonary:      Effort: Pulmonary effort is normal. No respiratory distress.      Breath sounds: Normal breath sounds. No wheezing or rales.   Abdominal:      General: Bowel sounds are normal. There is no distension.      Palpations: Abdomen is soft.      Tenderness: There is no abdominal tenderness.   Musculoskeletal:      Cervical back: Neck supple.   Skin:     General: Skin is warm and dry.   Neurological:      Mental Status: She is alert and  oriented to person, place, and time.   Psychiatric:         Behavior: Behavior normal.         Thought Content: Thought content normal.         Judgment: Judgment normal.         Assessment:       1. Atrial tachycardia    2. Essential hypertension    3. Morbid obesity    4. New onset atrial fibrillation         Plan:       In summary, Mrs. Junior is a pleasant 62 year-old woman with hypertension, morbid obesity, asymptomatic sinus bradycardia, and paroxysmal tachycardia now diagnosed with atrial fibrillation with RVR. Suspect paroxysmal. I had a long discussion with the patient about the pathophysiology and risks of atrial fibrillation and its basic pathophysiology, including its health implications and treatment options. Specifically, I addressed the need for CVA (stroke) prophylaxis with aspirin versus oral anticoagulation (warfarin vs DOACs, discussed bleeding risks, and need to come to the ER for any head trauma for CT scanning even if asymptomatic). Her [id number] score is 2 and anticoagulation is currently recommended. She will start eliquis. I also discussed the goal to reduce symptomatic arrhythmic episodes by pharmacologic and/or procedural methods and utilizing a rhythm versus a rate control strategy. Due to symptoms, RVR, and young age I recommend rhythm control. Discussed the problematic nature of symptomatic AF with RVR and asymptomatic sinus bradycardia. Prefer to avoid PPM just for anti-arrhythmic drug therapy. Discussed dofetilide versus PVI. She is open to try dofetilide. If she is intolerant to this or it doesn't work then would advocate for PVI.    Plan  Admit for dofetilide initiation  MIRIAM/DCCV if she presents in AF  Start eliquis 5mg bid.      Thank you for allowing me to participate in the care of this patient. Please do not hesitate to call me with any questions or concerns.    Prabhu Andrade MD, PhD  Cardiac Electrophysiology

## 2022-11-23 ENCOUNTER — TELEPHONE (OUTPATIENT)
Dept: ELECTROPHYSIOLOGY | Facility: CLINIC | Age: 62
End: 2022-11-23
Payer: COMMERCIAL

## 2022-11-23 DIAGNOSIS — I48.91 NEW ONSET ATRIAL FIBRILLATION: Primary | ICD-10-CM

## 2022-11-23 DIAGNOSIS — Z59.86 PATIENT CANNOT AFFORD MEDICATIONS: ICD-10-CM

## 2022-11-23 SDOH — SOCIAL DETERMINANTS OF HEALTH (SDOH): FINANCIAL INSECURITY: Z59.86

## 2022-11-23 NOTE — TELEPHONE ENCOUNTER
Spoke with patient's pharmacy [facility] and was informed that the out of pocket cost of the Eliquis with insurance is over $230/month. Spoke with patient who states that she did not purchase the Eliquis as it was unaffordable for her, and request and alternative medication. Patient advised that her pharmacy staff stated that she had a very high co-pay which could be effecting her cost, as well as Eliquis may not be the preferred drug on her insurance formulary. Patient states that she will contact her insurance to see why her cost is so high and to see what the preferred medication is. If not Eliquis, will discuss switching to preferred drug with Dr Andrade. If due to co-pay option for 30 day for coupon will be offered as well as applying for Eliquis  patient assistance through [employer].

## 2022-11-23 NOTE — TELEPHONE ENCOUNTER
----- Message from Digna Barker MA sent at 11/23/2022 11:31 AM CST -----  The patient would like to talk to the nurse about her medication Eliquis 5 mg and the cost  please call [phone]. Thank you

## 2022-11-23 NOTE — TELEPHONE ENCOUNTER
Spoke with patient who states that she spoke with her insurance and was told that unfortunately the Eliquis was that expensive because of her copay. Patient was advised that I called in the 30 day free coupon card information to her pharmacy and prescription should be picked up and started today, as well as I provided $10 co pay card for subsequent refills. Patient will let us know if she has problems with the coupon cards provided.

## 2022-11-25 ENCOUNTER — TELEPHONE (OUTPATIENT)
Dept: ELECTROPHYSIOLOGY | Facility: CLINIC | Age: 62
End: 2022-11-25
Payer: COMMERCIAL

## 2022-11-25 ENCOUNTER — TELEPHONE (OUTPATIENT)
Dept: PHARMACY | Facility: CLINIC | Age: 62
End: 2022-11-25
Payer: COMMERCIAL

## 2022-11-25 NOTE — TELEPHONE ENCOUNTER
Called Ms. Junior to inform of Tikosyn cost per month.  Instructed that the Tikosyn medication would control her heart rhythm as discussed with Dr. Andrade in clinic.   Patient states she spoke with [facility] representative and was told once she met her medication deductible her medication costs would be 10.00 out of pocket.   But this was for the Eliquis prescription.   Her Tikosyn cost to her with her insurance would be 245.00 per month.  She took the information down and was going to call [facility] to find out herself what the cost would be once her deductible was met.   Explained to patient that Dr. Andrade said it was up to her whether she was going to pay the cost or the alternate plan would be to do the cardioversion and then talk about the ablation after that.  Explained briefly what an ablation is, and what she could expect the day of the procedure.  Callback number provided.  Patient is going to make some calls and then make a decision.

## 2022-11-30 ENCOUNTER — TELEPHONE (OUTPATIENT)
Dept: ELECTROPHYSIOLOGY | Facility: CLINIC | Age: 62
End: 2022-11-30
Payer: COMMERCIAL

## 2022-11-30 LAB
APTT PPP: 33 SEC (ref 23–32)
BASOPHILS # BLD AUTO: 91 CELLS/UL (ref 0–200)
BASOPHILS NFR BLD AUTO: 1.4 %
BUN SERPL-MCNC: 26 MG/DL (ref 7–25)
BUN/CREAT SERPL: 20 (CALC) (ref 6–22)
CALCIUM SERPL-MCNC: 10.3 MG/DL (ref 8.6–10.4)
CHLORIDE SERPL-SCNC: 97 MMOL/L (ref 98–110)
CO2 SERPL-SCNC: 22 MMOL/L (ref 20–32)
CREAT SERPL-MCNC: 1.3 MG/DL (ref 0.5–1.05)
EGFR: 46 ML/MIN/1.73M2
EOSINOPHIL # BLD AUTO: 208 CELLS/UL (ref 15–500)
EOSINOPHIL NFR BLD AUTO: 3.2 %
ERYTHROCYTE [DISTWIDTH] IN BLOOD BY AUTOMATED COUNT: 13.8 % (ref 11–15)
GLUCOSE SERPL-MCNC: 53 MG/DL (ref 65–99)
HCT VFR BLD AUTO: 46.2 % (ref 35–45)
HGB BLD-MCNC: 15.4 G/DL (ref 11.7–15.5)
INR PPP: 1.2
LYMPHOCYTES # BLD AUTO: 2886 CELLS/UL (ref 850–3900)
LYMPHOCYTES NFR BLD AUTO: 44.4 %
MCH RBC QN AUTO: 30.1 PG (ref 27–33)
MCHC RBC AUTO-ENTMCNC: 33.3 G/DL (ref 32–36)
MCV RBC AUTO: 90.4 FL (ref 80–100)
MONOCYTES # BLD AUTO: 611 CELLS/UL (ref 200–950)
MONOCYTES NFR BLD AUTO: 9.4 %
NEUTROPHILS # BLD AUTO: 2704 CELLS/UL (ref 1500–7800)
NEUTROPHILS NFR BLD AUTO: 41.6 %
PLATELET # BLD AUTO: 266 THOUSAND/UL (ref 140–400)
PMV BLD REES-ECKER: 12.3 FL (ref 7.5–12.5)
POTASSIUM SERPL-SCNC: 3.9 MMOL/L (ref 3.5–5.3)
PROTHROMBIN TIME: 12 SEC (ref 9–11.5)
RBC # BLD AUTO: 5.11 MILLION/UL (ref 3.8–5.1)
SODIUM SERPL-SCNC: 137 MMOL/L (ref 135–146)
SPECIMEN INTEGRITY COMPROMISED: NORMAL
WBC # BLD AUTO: 6.5 THOUSAND/UL (ref 3.8–10.8)

## 2022-11-30 NOTE — TELEPHONE ENCOUNTER
Spoke with MsShala Vernon regarding her medication to be started after her cardioversion.   Patient asked me to call [facility] to get a quote for the medication.   [facility] said no Prior  Auth needed for this medication, this medication is covered by her insurance.  Estimated cost to the patient 135.65 monthly.  Notified patient who states she will go along with the plan to have the DCCV and then Tikosyn admit for 3 night stay to start the medication.  Other option offered by Dr. Andrade would be an ablation.  Patient then said she thought this was what she was having done.   Explained to the patient that Dr. Andrade wants to convert the heart first.  Explained Cardioversion to patient including process and expectations.  Patient said she will be here 12/6/22 as planned.   She verbalized understanding and is will to proceed.

## 2022-12-01 ENCOUNTER — TELEPHONE (OUTPATIENT)
Dept: ELECTROPHYSIOLOGY | Facility: CLINIC | Age: 62
End: 2022-12-01
Payer: COMMERCIAL

## 2022-12-01 NOTE — TELEPHONE ENCOUNTER
----- Message from Cassi Friedman RN sent at 12/1/2022  2:52 PM CST -----  Regarding: FW: Procedure    ----- Message -----  From: Lashon Ward  Sent: 12/1/2022   1:45 PM CST  To: Cassi Friedman RN  Subject: Procedure                                        Pt [phone] would like a call in ref to her procedure. She says she will be paying her portion today.    Thanks

## 2022-12-01 NOTE — TELEPHONE ENCOUNTER
Spoke to Ms. Roxane Vernon  who called about making her deposit for the procedure.  While on the phone with her I confirmed her instructions for Tuesday December 6, 2022.       CONFIRMED procedure arrival time of 9:00am.   She will be staying for 3 nights for the Tikosyn medication administration.   She is aware of the estimated cost from [facility] and is willing to try the medication.       Reiterated instructions including:  -Directions to check in desk  -NPO after midnight night prior to procedure  -Confirmed compliance of Eliquis taking twice a day including the morning of the procedure along with her other routine am meds.   -Pre-procedure LABS Received from [facility], no alerts noted.    -Confirmed absence or presence of implanted device/stimulator No implants.   -Confirmed no fever, cough, or shortness of breath in the past 30 days  --Do not wear mascara day of procedure  -Shower prior to arrival.   -Pack a overnight bag and plan to stay 3 nights.  Don't forget the phone .   -Reviewed current visitor policy    Patient verbalized understanding of above and appreciated the call.

## 2022-12-06 ENCOUNTER — HOSPITAL ENCOUNTER (INPATIENT)
Facility: HOSPITAL | Age: 62
LOS: 1 days | Discharge: HOME | DRG: 309 | End: 2022-12-09
Attending: INTERNAL MEDICINE | Admitting: HOSPITALIST
Payer: COMMERCIAL

## 2022-12-06 ENCOUNTER — HOSPITAL ENCOUNTER (OUTPATIENT)
Dept: CARDIOLOGY | Facility: HOSPITAL | Age: 62
Discharge: HOME | DRG: 309 | End: 2022-12-06
Attending: INTERNAL MEDICINE | Admitting: INTERNAL MEDICINE
Payer: COMMERCIAL

## 2022-12-06 ENCOUNTER — ANESTHESIA (OUTPATIENT)
Dept: MEDSURG UNIT | Facility: HOSPITAL | Age: 62
DRG: 309 | End: 2022-12-06
Payer: COMMERCIAL

## 2022-12-06 ENCOUNTER — ANESTHESIA EVENT (OUTPATIENT)
Dept: MEDSURG UNIT | Facility: HOSPITAL | Age: 62
DRG: 309 | End: 2022-12-06
Payer: COMMERCIAL

## 2022-12-06 ENCOUNTER — RESEARCH ENCOUNTER (OUTPATIENT)
Dept: RESEARCH | Facility: HOSPITAL | Age: 62
End: 2022-12-06
Payer: COMMERCIAL

## 2022-12-06 VITALS
DIASTOLIC BLOOD PRESSURE: 61 MMHG | HEIGHT: 64 IN | BODY MASS INDEX: 49.51 KG/M2 | WEIGHT: 290 LBS | SYSTOLIC BLOOD PRESSURE: 98 MMHG

## 2022-12-06 DIAGNOSIS — I50.30 HEART FAILURE WITH PRESERVED EJECTION FRACTION, UNSPECIFIED HF CHRONICITY: ICD-10-CM

## 2022-12-06 DIAGNOSIS — I10 ESSENTIAL HYPERTENSION: ICD-10-CM

## 2022-12-06 DIAGNOSIS — R00.1 SINUS BRADYCARDIA: ICD-10-CM

## 2022-12-06 DIAGNOSIS — R00.9 ABNORMAL HEART RATE: ICD-10-CM

## 2022-12-06 DIAGNOSIS — I48.91 ATRIAL FIBRILLATION, UNSPECIFIED TYPE: Primary | ICD-10-CM

## 2022-12-06 DIAGNOSIS — I49.9 ABNORMAL HEART RHYTHM: ICD-10-CM

## 2022-12-06 DIAGNOSIS — I50.30 (HFPEF) HEART FAILURE WITH PRESERVED EJECTION FRACTION: ICD-10-CM

## 2022-12-06 DIAGNOSIS — Z51.81 VISIT FOR MONITORING TIKOSYN THERAPY: ICD-10-CM

## 2022-12-06 DIAGNOSIS — Z79.899 VISIT FOR MONITORING TIKOSYN THERAPY: ICD-10-CM

## 2022-12-06 DIAGNOSIS — J32.9 CHRONIC SINUSITIS, UNSPECIFIED LOCATION: ICD-10-CM

## 2022-12-06 DIAGNOSIS — I48.91 ATRIAL FIBRILLATION: ICD-10-CM

## 2022-12-06 DIAGNOSIS — I48.91 NEW ONSET ATRIAL FIBRILLATION: ICD-10-CM

## 2022-12-06 LAB
ASCENDING AORTA: 3.1 CM
BSA FOR ECHO PROCEDURE: 2.44 M2
EJECTION FRACTION: 40 %
SINUS: 3 CM
STJ: 2.9 CM

## 2022-12-06 PROCEDURE — 93320 DOPPLER ECHO COMPLETE: CPT | Mod: 26,,, | Performed by: INTERNAL MEDICINE

## 2022-12-06 PROCEDURE — 93325 DOPPLER ECHO COLOR FLOW MAPG: CPT | Mod: 26,,, | Performed by: INTERNAL MEDICINE

## 2022-12-06 PROCEDURE — 25000003 PHARM REV CODE 250: Performed by: INTERNAL MEDICINE

## 2022-12-06 PROCEDURE — 93320 DOPPLER ECHO COMPLETE: CPT

## 2022-12-06 PROCEDURE — D9220A PRA ANESTHESIA: Mod: ANES,,, | Performed by: ANESTHESIOLOGY

## 2022-12-06 PROCEDURE — 93005 ELECTROCARDIOGRAM TRACING: CPT

## 2022-12-06 PROCEDURE — 99223 1ST HOSP IP/OBS HIGH 75: CPT | Mod: ,,, | Performed by: INTERNAL MEDICINE

## 2022-12-06 PROCEDURE — 92960 PR CARDIOVERSION, ELECTIVE;EXTERN: ICD-10-PCS | Mod: ,,, | Performed by: INTERNAL MEDICINE

## 2022-12-06 PROCEDURE — 93010 ELECTROCARDIOGRAM REPORT: CPT | Mod: 76,,, | Performed by: INTERNAL MEDICINE

## 2022-12-06 PROCEDURE — 25000003 PHARM REV CODE 250: Performed by: NURSE ANESTHETIST, CERTIFIED REGISTERED

## 2022-12-06 PROCEDURE — 93312 TRANSESOPHAGEAL ECHO (TEE) (CUPID ONLY): ICD-10-PCS | Mod: 26,,, | Performed by: INTERNAL MEDICINE

## 2022-12-06 PROCEDURE — 92960 CARDIOVERSION ELECTRIC EXT: CPT | Performed by: INTERNAL MEDICINE

## 2022-12-06 PROCEDURE — 93312 ECHO TRANSESOPHAGEAL: CPT | Mod: 26,,, | Performed by: INTERNAL MEDICINE

## 2022-12-06 PROCEDURE — D9220A PRA ANESTHESIA: Mod: CRNA,,, | Performed by: NURSE ANESTHETIST, CERTIFIED REGISTERED

## 2022-12-06 PROCEDURE — 36415 COLL VENOUS BLD VENIPUNCTURE: CPT | Performed by: INTERNAL MEDICINE

## 2022-12-06 PROCEDURE — 63600175 PHARM REV CODE 636 W HCPCS: Performed by: NURSE ANESTHETIST, CERTIFIED REGISTERED

## 2022-12-06 PROCEDURE — D9220A PRA ANESTHESIA: ICD-10-PCS | Mod: CRNA,,, | Performed by: NURSE ANESTHETIST, CERTIFIED REGISTERED

## 2022-12-06 PROCEDURE — 80048 BASIC METABOLIC PNL TOTAL CA: CPT | Performed by: INTERNAL MEDICINE

## 2022-12-06 PROCEDURE — 37000008 HC ANESTHESIA 1ST 15 MINUTES: Performed by: INTERNAL MEDICINE

## 2022-12-06 PROCEDURE — 93325 TRANSESOPHAGEAL ECHO (TEE) (CUPID ONLY): ICD-10-PCS | Mod: 26,,, | Performed by: INTERNAL MEDICINE

## 2022-12-06 PROCEDURE — 93010 ELECTROCARDIOGRAM REPORT: CPT | Mod: ,,, | Performed by: INTERNAL MEDICINE

## 2022-12-06 PROCEDURE — 93010 EKG 12-LEAD: ICD-10-PCS | Mod: 76,,, | Performed by: INTERNAL MEDICINE

## 2022-12-06 PROCEDURE — 93010 EKG 12-LEAD: ICD-10-PCS | Mod: ,,, | Performed by: INTERNAL MEDICINE

## 2022-12-06 PROCEDURE — 99223 PR INITIAL HOSPITAL CARE,LEVL III: ICD-10-PCS | Mod: ,,, | Performed by: INTERNAL MEDICINE

## 2022-12-06 PROCEDURE — D9220A PRA ANESTHESIA: ICD-10-PCS | Mod: ANES,,, | Performed by: ANESTHESIOLOGY

## 2022-12-06 PROCEDURE — 92960 CARDIOVERSION ELECTRIC EXT: CPT | Mod: ,,, | Performed by: INTERNAL MEDICINE

## 2022-12-06 PROCEDURE — 93320 TRANSESOPHAGEAL ECHO (TEE) (CUPID ONLY): ICD-10-PCS | Mod: 26,,, | Performed by: INTERNAL MEDICINE

## 2022-12-06 PROCEDURE — 37000009 HC ANESTHESIA EA ADD 15 MINS: Performed by: INTERNAL MEDICINE

## 2022-12-06 RX ORDER — AMOXICILLIN 250 MG
1 CAPSULE ORAL 2 TIMES DAILY
Status: DISCONTINUED | OUTPATIENT
Start: 2022-12-06 | End: 2022-12-09 | Stop reason: HOSPADM

## 2022-12-06 RX ORDER — PROPOFOL 10 MG/ML
VIAL (ML) INTRAVENOUS
Status: DISCONTINUED | OUTPATIENT
Start: 2022-12-06 | End: 2022-12-06

## 2022-12-06 RX ORDER — ACETAMINOPHEN 325 MG/1
650 TABLET ORAL EVERY 6 HOURS PRN
Status: DISCONTINUED | OUTPATIENT
Start: 2022-12-06 | End: 2022-12-09 | Stop reason: HOSPADM

## 2022-12-06 RX ORDER — METHIMAZOLE 5 MG/1
5 TABLET ORAL DAILY
Status: DISCONTINUED | OUTPATIENT
Start: 2022-12-06 | End: 2022-12-09 | Stop reason: HOSPADM

## 2022-12-06 RX ORDER — MONTELUKAST SODIUM 10 MG/1
10 TABLET ORAL DAILY
Status: DISCONTINUED | OUTPATIENT
Start: 2022-12-06 | End: 2022-12-09 | Stop reason: HOSPADM

## 2022-12-06 RX ORDER — MIDAZOLAM HYDROCHLORIDE 1 MG/ML
INJECTION, SOLUTION INTRAMUSCULAR; INTRAVENOUS
Status: DISCONTINUED | OUTPATIENT
Start: 2022-12-06 | End: 2022-12-06

## 2022-12-06 RX ORDER — PHENYLEPHRINE HYDROCHLORIDE 10 MG/ML
INJECTION INTRAVENOUS
Status: DISCONTINUED | OUTPATIENT
Start: 2022-12-06 | End: 2022-12-06

## 2022-12-06 RX ORDER — NALOXONE HCL 0.4 MG/ML
0.02 VIAL (ML) INJECTION
Status: DISCONTINUED | OUTPATIENT
Start: 2022-12-06 | End: 2022-12-09 | Stop reason: HOSPADM

## 2022-12-06 RX ORDER — SODIUM CHLORIDE 0.9 % (FLUSH) 0.9 %
3 SYRINGE (ML) INJECTION
Status: DISCONTINUED | OUTPATIENT
Start: 2022-12-06 | End: 2022-12-09 | Stop reason: HOSPADM

## 2022-12-06 RX ORDER — TRIAMTERENE AND HYDROCHLOROTHIAZIDE 37.5; 25 MG/1; MG/1
1 CAPSULE ORAL DAILY
Status: DISCONTINUED | OUTPATIENT
Start: 2022-12-06 | End: 2022-12-06

## 2022-12-06 RX ORDER — DOFETILIDE 0.25 MG/1
500 CAPSULE ORAL EVERY 12 HOURS
Status: DISCONTINUED | OUTPATIENT
Start: 2022-12-06 | End: 2022-12-07

## 2022-12-06 RX ORDER — TALC
6 POWDER (GRAM) TOPICAL NIGHTLY PRN
Status: DISCONTINUED | OUTPATIENT
Start: 2022-12-06 | End: 2022-12-09 | Stop reason: HOSPADM

## 2022-12-06 RX ORDER — SODIUM CHLORIDE 0.9 % (FLUSH) 0.9 %
5 SYRINGE (ML) INJECTION
Status: DISCONTINUED | OUTPATIENT
Start: 2022-12-06 | End: 2022-12-09 | Stop reason: HOSPADM

## 2022-12-06 RX ORDER — METHIMAZOLE 5 MG/1
5 TABLET ORAL DAILY
COMMUNITY
Start: 2022-11-27 | End: 2022-12-20

## 2022-12-06 RX ORDER — LIDOCAINE HYDROCHLORIDE 20 MG/ML
SOLUTION OROPHARYNGEAL
Status: DISCONTINUED | OUTPATIENT
Start: 2022-12-06 | End: 2022-12-06

## 2022-12-06 RX ORDER — PROPOFOL 10 MG/ML
VIAL (ML) INTRAVENOUS CONTINUOUS PRN
Status: DISCONTINUED | OUTPATIENT
Start: 2022-12-06 | End: 2022-12-06

## 2022-12-06 RX ORDER — AMLODIPINE BESYLATE 10 MG/1
10 TABLET ORAL DAILY
Status: DISCONTINUED | OUTPATIENT
Start: 2022-12-06 | End: 2022-12-06

## 2022-12-06 RX ORDER — LIDOCAINE HCL/PF 100 MG/5ML
SYRINGE (ML) INTRAVENOUS
Status: DISCONTINUED | OUTPATIENT
Start: 2022-12-06 | End: 2022-12-06

## 2022-12-06 RX ADMIN — MIDAZOLAM HYDROCHLORIDE 1 MG: 1 INJECTION, SOLUTION INTRAMUSCULAR; INTRAVENOUS at 10:12

## 2022-12-06 RX ADMIN — GLYCOPYRROLATE 0.2 MG: 0.2 INJECTION, SOLUTION INTRAMUSCULAR; INTRAVENOUS at 10:12

## 2022-12-06 RX ADMIN — LIDOCAINE HYDROCHLORIDE 15 ML: 20 SOLUTION ORAL; TOPICAL at 10:12

## 2022-12-06 RX ADMIN — DOFETILIDE 500 MCG: 0.25 CAPSULE ORAL at 08:12

## 2022-12-06 RX ADMIN — PHENYLEPHRINE HYDROCHLORIDE 100 MCG: 10 INJECTION INTRAVENOUS at 10:12

## 2022-12-06 RX ADMIN — LIDOCAINE HYDROCHLORIDE 100 MG: 20 INJECTION, SOLUTION INTRAVENOUS at 10:12

## 2022-12-06 RX ADMIN — APIXABAN 5 MG: 5 TABLET, FILM COATED ORAL at 08:12

## 2022-12-06 RX ADMIN — PROPOFOL 100 MCG/KG/MIN: 10 INJECTION, EMULSION INTRAVENOUS at 10:12

## 2022-12-06 RX ADMIN — PROPOFOL 40 MG: 10 INJECTION, EMULSION INTRAVENOUS at 10:12

## 2022-12-06 RX ADMIN — SODIUM CHLORIDE: 0.9 INJECTION, SOLUTION INTRAVENOUS at 09:12

## 2022-12-06 NOTE — SUBJECTIVE & OBJECTIVE
Past Medical History:   Diagnosis Date    Atrial fibrillation     Hypertension     Hyperthyroidism        Past Surgical History:   Procedure Laterality Date    TUBAL LIGATION         Review of patient's allergies indicates:  No Known Allergies    No current facility-administered medications on file prior to encounter.     Current Outpatient Medications on File Prior to Encounter   Medication Sig    amLODIPine (NORVASC) 10 MG tablet Take 10 mg by mouth once daily.    apixaban (ELIQUIS) 5 mg Tab Take 1 tablet (5 mg total) by mouth 2 (two) times daily.    montelukast (SINGULAIR) 10 mg tablet Take 10 mg by mouth once daily. PRN    multivitamin capsule Take 1 capsule by mouth once daily.    triamterene-hydrochlorothiazide 37.5-25 mg (MAXZIDE-25) 37.5-25 mg per tablet Take 1 tablet by mouth once daily.     Family History    None       Tobacco Use    Smoking status: Never    Smokeless tobacco: Never   Substance and Sexual Activity    Alcohol use: Yes     Comment: occ    Drug use: Never    Sexual activity: Not on file     Review of Systems   Constitutional: Negative.   HENT: Negative.     Eyes: Negative.    Cardiovascular: Negative.    Respiratory: Negative.     Endocrine: Negative.    Skin: Negative.    Musculoskeletal: Negative.    Gastrointestinal: Negative.    Neurological: Negative.    Objective:     Vital Signs (Most Recent):  Temp: 97.3 °F (36.3 °C) (12/06/22 0933)  Pulse: 90 (12/06/22 0933)  Resp: 18 (12/06/22 0933)  BP: 112/83 (12/06/22 0934)  SpO2: 98 % (12/06/22 0933) Vital Signs (24h Range):  Temp:  [97.3 °F (36.3 °C)] 97.3 °F (36.3 °C)  Pulse:  [90] 90  Resp:  [18] 18  SpO2:  [98 %] 98 %  BP: (112-113)/(68-83) 112/83     Weight: 131.5 kg (290 lb)  Body mass index is 49.78 kg/m².    SpO2: 98 %  O2 Device (Oxygen Therapy): room air    No intake or output data in the 24 hours ending 12/06/22 0940    Lines/Drains/Airways       Peripheral Intravenous Line  Duration                  Peripheral IV - Single Lumen  12/06/22 0937 20 G Right Forearm <1 day                    Physical Exam  Constitutional:       Appearance: Normal appearance.   HENT:      Head: Normocephalic.      Nose: Nose normal.   Eyes:      Extraocular Movements: Extraocular movements intact.      Pupils: Pupils are equal, round, and reactive to light.   Cardiovascular:      Rate and Rhythm: Normal rate. Rhythm irregular.   Pulmonary:      Effort: Pulmonary effort is normal.      Breath sounds: Normal breath sounds.   Abdominal:      General: Abdomen is flat. Bowel sounds are normal.      Palpations: Abdomen is soft.   Musculoskeletal:         General: Normal range of motion.      Cervical back: Normal range of motion.   Skin:     General: Skin is warm.      Capillary Refill: Capillary refill takes less than 2 seconds.   Neurological:      General: No focal deficit present.      Mental Status: She is alert and oriented to person, place, and time.       Significant Labs: All pertinent lab results from the last 24 hours have been reviewed.    Significant Imaging: EKG: Atrial fib

## 2022-12-06 NOTE — PLAN OF CARE
Patient received to cath  holding bay 6 s/p mariam/dccv. Patietn sedated, sleepy, but arousable. And responds to voice. Bedside monitoring connected. Vss. EKG notified. Bedside report received from CRNA, RN. Will monitor.

## 2022-12-06 NOTE — PROGRESS NOTES
Study title: Efficacy and Safety of Dual Direct Current Cardioversion Versus Single Direct Current  Cardioversion as an Initial Treatment Strategy in Obese Patients   IRB #: [id number]  IRB approval date: 9/15/2020  Sponsor: [facility]  Subject ID: [id number]     Met with patient to do post-procedure action item. Asked patient if they experience any chest discomfort related to cardioversion.     Patient denied any chest discomfort.  Pain Scale: 0

## 2022-12-06 NOTE — ASSESSMENT & PLAN NOTE
Patient has Tachyarrhythmia. Today in atrial fib     Here today for MIRIAM to rule out ELIZABETH cloth   -No absolute contraindications of esophageal stricture, tumor, perforation, laceration,or diverticulum and/or active GI bleed  -The risks, benefits & alternatives of the procedure were explained to the patient.   -The risks of transesophageal echo include but are not limited to:  Dental trauma, esophageal trauma/perforation, bleeding, laryngospasm/brochospasm, aspiration, sore throat/hoarseness, & dislodgement of the endotracheal tube/nasogastric tube (where applicable).    -The risks of ANES monitored sedation include hypotension, respiratory depression, arrhythmias, bronchospasm, & death.    -Informed consent was obtained. The patient is agreeable to proceed with the procedure and all questions and concerns addressed.    Case discussed with an attending in echocardiography lab.    Further recommendations per attending addendum

## 2022-12-06 NOTE — PROGRESS NOTES
Date Consent signed: 12/06/2022      Sponsor: [facility]     Study Title/IRB Number: Dual vs Single DCCV / [id number]     Principle Investigator: Harvey Garza MD     Present for Discussion: myself, patient and patient's      Is LAR Consenting for Subject: No     Prior to the Informed Consent (IC) being signed, or any study protocol required data collection, testing, procedure, or intervention being performed, the following was done and/or discussed:  Patient was given a copy of the IC for review   Purpose of the study and qualifications to participate   Study design, Follow up schedule, and tests or procedures done at each visit  Confidentiality and HIPAA Authorization for Release of Medical Records for the research trial/ subject's rights/research related injury  Risk, Benefits, Alternative Treatments, Compensation and Costs  Participation in the research trial is voluntary and patient may withdraw at anytime  Contact information for study related questions     Patient verbalizes understanding of the above: Yes  Contact information for CRC and PI given to patient: Yes  Patient able to adequately summarize: the purpose of the study, the risks associated with the study, and all procedures, testing, and follow-ups associated with the study: Yes     Roxane Junior signed the informed consent form for the Dual vs Single DCCV research study with an IRB approval date of 9/15/2020.  Each page of the consent form was reviewed with Roxane Junior (and pts family) and all questions answered satisfactorily. Roxane Junior signed the consent form and received a copy of same. The original consent was scanned into electronic medical records (EPIC) and filed into the subject's research study binder.        After signing consent, patient was randomized via [employer] to Dual DCCV     Slime SCOTT RN, Dr. Andrade, Dr. Garza, and Dr. Brock were notified via e-mail. Expressed that patient is blinded to assignment. Randomization  sign was placed in patient's chart.

## 2022-12-06 NOTE — CONSULTS
[facility]  Cardiology  Consult Note    Patient Name: Roxane Junior  MRN: [id number]  Admission Date: 12/6/2022  Hospital Length of Stay: 0 days  Code Status: No Order   Attending Provider: Prabhu Andrade MD   Consulting Provider: Luis Eduardo Selby MD  Primary Care Physician: Dinesh Pacheco MD  Principal Problem:<principal problem not specified>    Patient information was obtained from patient and ER records.     Consults  Subjective:     Chief Complaint:  Palpitations      HPI:   62 year-old woman with hypertension, morbid obesity and bradycardia. She was referred to Dr. Koehler in 2021 for evaluation of bradycardia. She wore a holter monitor in July of 2021 which noted an average sinus rate of 41 bpm. There were periods during day and night time where her rate was in the 30s. She did have frequent PACs. She reports her heart rate has been like this for many years. She denies any fatigue, near syncope, or syncope. Since that visit she reported noting an episode of heart racing lasting 5 minutes in July of 2022. A 30 day event monitor was ordered which noted periods of bradycardia and an episode of long RP narrow complex tachycardia with a rate of 150 bpm (incorrectly read as atrial fibrillation by the monitoring company). This occurred at midnight on 9/9/2022. She was asleep. We discussed that should she begin to have frequent symptomatic episodes the treatment options would be EPS/ablation or attempt drug therapy which may require PPM implantation.     7/2021: holter noted sinus bradycardia with frequent PACs (8% burden). Average rate 41 bpm.     9/2022: ECHO noted preserved LV function.    Presents for MIRIAM/DCCV followed by admission for Dofetilide initiation     Anticoagulant/antiplatelets: Eliquis   ECG:Atrial fib     Dysphagia or odynophagia:  No  Liver Disease, esophageal disease, or known varices:  No  Upper GI Bleeding: No  Snoring:  No  Sleep Apnea:  No  Prior neck surgery or  radiation:  No  History of anesthetic difficulties:  No  Family history of anesthetic difficulties:  No  Last oral intake: yesterday before midnight  Able to move neck in all directions:  Yes  Platelet count: 266  INR: 1.3        Past Medical History:   Diagnosis Date    Atrial fibrillation     Hypertension     Hyperthyroidism        Past Surgical History:   Procedure Laterality Date    TUBAL LIGATION         Review of patient's allergies indicates:  No Known Allergies    No current facility-administered medications on file prior to encounter.     Current Outpatient Medications on File Prior to Encounter   Medication Sig    amLODIPine (NORVASC) 10 MG tablet Take 10 mg by mouth once daily.    apixaban (ELIQUIS) 5 mg Tab Take 1 tablet (5 mg total) by mouth 2 (two) times daily.    montelukast (SINGULAIR) 10 mg tablet Take 10 mg by mouth once daily. PRN    multivitamin capsule Take 1 capsule by mouth once daily.    triamterene-hydrochlorothiazide 37.5-25 mg (MAXZIDE-25) 37.5-25 mg per tablet Take 1 tablet by mouth once daily.     Family History    None       Tobacco Use    Smoking status: Never    Smokeless tobacco: Never   Substance and Sexual Activity    Alcohol use: Yes     Comment: occ    Drug use: Never    Sexual activity: Not on file     Review of Systems   Constitutional: Negative.   HENT: Negative.     Eyes: Negative.    Cardiovascular: Negative.    Respiratory: Negative.     Endocrine: Negative.    Skin: Negative.    Musculoskeletal: Negative.    Gastrointestinal: Negative.    Neurological: Negative.    Objective:     Vital Signs (Most Recent):  Temp: 97.3 °F (36.3 °C) (12/06/22 0933)  Pulse: 90 (12/06/22 0933)  Resp: 18 (12/06/22 0933)  BP: 112/83 (12/06/22 0934)  SpO2: 98 % (12/06/22 0933) Vital Signs (24h Range):  Temp:  [97.3 °F (36.3 °C)] 97.3 °F (36.3 °C)  Pulse:  [90] 90  Resp:  [18] 18  SpO2:  [98 %] 98 %  BP: (112-113)/(68-83) 112/83     Weight: 131.5 kg (290 lb)  Body mass index is 49.78  kg/m².    SpO2: 98 %  O2 Device (Oxygen Therapy): room air    No intake or output data in the 24 hours ending 12/06/22 0940    Lines/Drains/Airways       Peripheral Intravenous Line  Duration                  Peripheral IV - Single Lumen 12/06/22 0937 20 G Right Forearm <1 day                    Physical Exam  Constitutional:       Appearance: Normal appearance.   HENT:      Head: Normocephalic.      Nose: Nose normal.   Eyes:      Extraocular Movements: Extraocular movements intact.      Pupils: Pupils are equal, round, and reactive to light.   Cardiovascular:      Rate and Rhythm: Normal rate. Rhythm irregular.   Pulmonary:      Effort: Pulmonary effort is normal.      Breath sounds: Normal breath sounds.   Abdominal:      General: Abdomen is flat. Bowel sounds are normal.      Palpations: Abdomen is soft.   Musculoskeletal:         General: Normal range of motion.      Cervical back: Normal range of motion.   Skin:     General: Skin is warm.      Capillary Refill: Capillary refill takes less than 2 seconds.   Neurological:      General: No focal deficit present.      Mental Status: She is alert and oriented to person, place, and time.       Significant Labs: All pertinent lab results from the last 24 hours have been reviewed.    Significant Imaging: EKG: Atrial fib     Assessment and Plan:     New onset atrial fibrillation  Patient has Tachyarrhythmia. Today in atrial fib     Here today for MIRIAM to rule out ELIZABETH cloth   -No absolute contraindications of esophageal stricture, tumor, perforation, laceration,or diverticulum and/or active GI bleed  -The risks, benefits & alternatives of the procedure were explained to the patient.   -The risks of transesophageal echo include but are not limited to:  Dental trauma, esophageal trauma/perforation, bleeding, laryngospasm/brochospasm, aspiration, sore throat/hoarseness, & dislodgement of the endotracheal tube/nasogastric tube (where applicable).    -The risks of ANES  monitored sedation include hypotension, respiratory depression, arrhythmias, bronchospasm, & death.    -Informed consent was obtained. The patient is agreeable to proceed with the procedure and all questions and concerns addressed.    Case discussed with an attending in echocardiography lab.    Further recommendations per attending addendum          VTE Risk Mitigation (From admission, onward)      None            Thank you for your consult. I will sign off. Please contact us if you have any additional questions.    Luis Eduardo Selby MD  Cardiology   [facility]

## 2022-12-06 NOTE — PLAN OF CARE
patient received s/p mariam/dccv . Report received from CRNA and RN. Bedside monitoring connected. Vss. Patient sedated, but aroussable. Family notified. EKG completed. Will monitor.

## 2022-12-06 NOTE — ANESTHESIA PREPROCEDURE EVALUATION
12/06/2022  Roxane Junior is a 62 y.o., female.      Pre-op Assessment    I have reviewed the Patient Summary Reports.       I have reviewed the Medications.     Review of Systems  Anesthesia Hx:  No previous Anesthesia  History of prior surgery of interest to airway management or planning:   Social:  Non-Smoker, Social Alcohol Use    Hematology/Oncology:  Hematology Normal   Oncology Normal     EENT/Dental:EENT/Dental Normal   Cardiovascular:   Exercise tolerance: poor Hypertension, well controlled    Pulmonary:  Pulmonary Normal    Renal/:  Renal/ Normal     Hepatic/GI:  Hepatic/GI Normal    Musculoskeletal:  Musculoskeletal Normal    Neurological:  Neurology Normal    Endocrine:   Hyperthyroidism    Dermatological:  Skin Normal    Psych:  Psychiatric Normal           Physical Exam  General: Well nourished, Cooperative, Alert and Oriented    Airway:  Mallampati: II   Mouth Opening: Normal  TM Distance: Normal  Tongue: Normal  Neck ROM: Normal ROM    Dental:  Intact        Anesthesia Plan  Type of Anesthesia, risks & benefits discussed:    Anesthesia Type: Gen Natural Airway  Intra-op Monitoring Plan: Standard ASA Monitors  Post Op Pain Control Plan: multimodal analgesia and IV/PO Opioids PRN  Induction:  IV  Airway Plan: Video  Informed Consent: Informed consent signed with the Patient and all parties understand the risks and agree with anesthesia plan.  All questions answered.   ASA Score: 3    Ready For Surgery From Anesthesia Perspective.     .

## 2022-12-06 NOTE — HPI
62 year-old woman with hypertension, morbid obesity and bradycardia. She was referred to Dr. Koehler in 2021 for evaluation of bradycardia. She wore a holter monitor in July of 2021 which noted an average sinus rate of 41 bpm. There were periods during day and night time where her rate was in the 30s. She did have frequent PACs. She reports her heart rate has been like this for many years. She denies any fatigue, near syncope, or syncope. Since that visit she reported noting an episode of heart racing lasting 5 minutes in July of 2022. A 30 day event monitor was ordered which noted periods of bradycardia and an episode of long RP narrow complex tachycardia with a rate of 150 bpm (incorrectly read as atrial fibrillation by the monitoring company). This occurred at midnight on 9/9/2022. She was asleep. We discussed that should she begin to have frequent symptomatic episodes the treatment options would be EPS/ablation or attempt drug therapy which may require PPM implantation.     7/2021: holter noted sinus bradycardia with frequent PACs (8% burden). Average rate 41 bpm.     9/2022: ECHO noted preserved LV function.    Presents for MIRIAM/DCCV followed by admission for Dofetilide initiation     Anticoagulant/antiplatelets: Eliquis   ECG:Atrial fib     Dysphagia or odynophagia:  No  Liver Disease, esophageal disease, or known varices:  No  Upper GI Bleeding: No  Snoring:  No  Sleep Apnea:  No  Prior neck surgery or radiation:  No  History of anesthetic difficulties:  No  Family history of anesthetic difficulties:  No  Last oral intake: yesterday before midnight  Able to move neck in all directions:  Yes  Platelet count: 266  INR: 1.3

## 2022-12-06 NOTE — TRANSFER OF CARE
"Anesthesia Transfer of Care Note    Patient: Roxane Junior    Procedure(s) Performed: Procedure(s) (LRB):  CARDIOVERSION (N/A)  Transesophageal echo (MIRIAM) intra-procedure log documentation (N/A)    Patient location: PACU    Anesthesia Type: general    Transport from OR: Transported from OR on 6-10 L/min O2 by face mask with adequate spontaneous ventilation    Post pain: adequate analgesia    Post assessment: no apparent anesthetic complications and tolerated procedure well    Post vital signs: stable    Level of consciousness: awake and alert    Nausea/Vomiting: no nausea/vomiting    Complications: none    Transfer of care protocol was followed      Last vitals:   Visit Vitals  /83 (BP Location: Right arm, Patient Position: Lying)   Pulse 90   Temp 36.3 °C (97.3 °F) (Temporal)   Resp 18   Ht 5' 4" (1.626 m)   Wt 131.5 kg (290 lb)   SpO2 98%   Breastfeeding No   BMI 49.78 kg/m²     "

## 2022-12-06 NOTE — INTERVAL H&P NOTE
Ms. Junior is a 62 year old female who presents today to [facility] for scheduled MIRIAM/DCCV with plan for Tikosyn admission post procedure with Dr. Andrade. She denies any chest pain, SOB, RIZVI, dizziness, light headedness, weakness, LE swelling, syncope, or near syncopal episodes. She does note palpitations when out of rhythm. She denies any bleeding, infections, fevers, rashes, or surgeries in the past 30 days. VSS. She is currently taking Eliquis 5 mg BID (last dose this AM).     The patient has been examined and the H&P has been reviewed:     I concur with the findings and no changes have occurred since H&P was written.      Review of Systems   Constitution: Negative. Negative for fevers/chills, weakness and malaise/fatigue.   HENT: Negative.  Negative for ear pain and tinnitus.    Eyes: Negative for blurred vision.   Cardiovascular: Negative. Negative for chest pain, dyspnea on exertion, leg swelling, near-syncope, and syncope. Positive for palpitations   Respiratory: Negative. Negative for shortness of breath.    Endocrine: Negative.  Negative for polyuria.   Hematologic/Lymphatic: Negative for bruise/bleed easily. Negative for significant bleeding.  Skin: Negative.  Negative for rash.   Musculoskeletal: Negative.  Negative for joint pain and muscle weakness.   Gastrointestinal: Negative.  Negative for abdominal pain, hematemesis, and change in bowel habit.   Genitourinary: Negative for frequency or hematuria.   Neurological: Negative. Negative for dizziness.   Psychiatric/Behavioral: Negative. Negative for depression. The patient is nervous/anxious.       Physical Exam   Constitutional: Oriented to person, place, and time. Obese  HENT:   Head: Normocephalic and atraumatic.   Eyes: Conjunctivae, EOM and lids are normal. No scleral icterus.   Neck: Normal range of motion.   Cardiovascular:  Tachycardia present. Rhythm irregularly irregular.   Pulses:       Radial pulses are 2+ on the right side, and 2+ on the left  side.   Pulmonary/Chest: Effort normal and breath sounds normal. No accessory muscle usage. No tachypnea. No respiratory distress. No wheezes.   Abdominal: Soft. Bowel sounds are normal. No distension. There is no tenderness.   Musculoskeletal: Normal range of motion. No edema. No focal numbness or weakness.  Neurological: Alert and oriented to person, place, and time. Normal muscle tone.   Skin: Skin is warm and dry. No rash noted.   Psychiatric: Normal mood and affect. Behavior is normal. She appears appropriately anxious  Nursing note and vitals reviewed.     Labs: pre procedure labs from 11/29/22 reviewed.      Significant Studies: ECG today reveals AF with RVR at 144 BPM.      Active Hospital Problems    Diagnosis  POA    New onset atrial fibrillation [I48.91]  Yes      Resolved Hospital Problems   No resolved problems to display.     Plan:  - Planned for MIRIAM/DCCV  - Anesthesia for sedation.   - Plan for tikosyn admission post procedure.    Prior to procedure, extensive discussion with patient regarding risks and benefits of DCCV, and patient would like to proceed. Dr. bautista at bedside to answer all questions.  The patient and h voice understanding and all questions have been answered. No further questions/concerns voiced at this time. Consents signed.    DONNA Jones  Cardiology Electrophysiology NP   [facility]    Attending: Prabhu Andrade MD

## 2022-12-07 LAB
ANION GAP SERPL CALC-SCNC: 8 MMOL/L (ref 8–16)
ANION GAP SERPL CALC-SCNC: 8 MMOL/L (ref 8–16)
BASOPHILS # BLD AUTO: 0.07 K/UL (ref 0–0.2)
BASOPHILS NFR BLD: 1.1 % (ref 0–1.9)
BUN SERPL-MCNC: 16 MG/DL (ref 8–23)
BUN SERPL-MCNC: 19 MG/DL (ref 8–23)
CALCIUM SERPL-MCNC: 10 MG/DL (ref 8.7–10.5)
CALCIUM SERPL-MCNC: 9.6 MG/DL (ref 8.7–10.5)
CHLORIDE SERPL-SCNC: 104 MMOL/L (ref 95–110)
CHLORIDE SERPL-SCNC: 105 MMOL/L (ref 95–110)
CO2 SERPL-SCNC: 25 MMOL/L (ref 23–29)
CO2 SERPL-SCNC: 27 MMOL/L (ref 23–29)
CREAT SERPL-MCNC: 1 MG/DL (ref 0.5–1.4)
CREAT SERPL-MCNC: 1.2 MG/DL (ref 0.5–1.4)
DIFFERENTIAL METHOD: ABNORMAL
EOSINOPHIL # BLD AUTO: 0.2 K/UL (ref 0–0.5)
EOSINOPHIL NFR BLD: 3.7 % (ref 0–8)
ERYTHROCYTE [DISTWIDTH] IN BLOOD BY AUTOMATED COUNT: 14.5 % (ref 11.5–14.5)
EST. GFR  (NO RACE VARIABLE): 50.9 ML/MIN/1.73 M^2
EST. GFR  (NO RACE VARIABLE): >60 ML/MIN/1.73 M^2
GLUCOSE SERPL-MCNC: 95 MG/DL (ref 70–110)
GLUCOSE SERPL-MCNC: 99 MG/DL (ref 70–110)
HCT VFR BLD AUTO: 36.1 % (ref 37–48.5)
HGB BLD-MCNC: 11.8 G/DL (ref 12–16)
IMM GRANULOCYTES # BLD AUTO: 0.01 K/UL (ref 0–0.04)
IMM GRANULOCYTES NFR BLD AUTO: 0.2 % (ref 0–0.5)
LYMPHOCYTES # BLD AUTO: 2.6 K/UL (ref 1–4.8)
LYMPHOCYTES NFR BLD: 40.4 % (ref 18–48)
MAGNESIUM SERPL-MCNC: 1.5 MG/DL (ref 1.6–2.6)
MCH RBC QN AUTO: 30.3 PG (ref 27–31)
MCHC RBC AUTO-ENTMCNC: 32.7 G/DL (ref 32–36)
MCV RBC AUTO: 93 FL (ref 82–98)
MONOCYTES # BLD AUTO: 0.7 K/UL (ref 0.3–1)
MONOCYTES NFR BLD: 11.4 % (ref 4–15)
NEUTROPHILS # BLD AUTO: 2.8 K/UL (ref 1.8–7.7)
NEUTROPHILS NFR BLD: 43.2 % (ref 38–73)
NRBC BLD-RTO: 0 /100 WBC
PHOSPHATE SERPL-MCNC: 3.4 MG/DL (ref 2.7–4.5)
PLATELET # BLD AUTO: 193 K/UL (ref 150–450)
PMV BLD AUTO: 12.2 FL (ref 9.2–12.9)
POTASSIUM SERPL-SCNC: 3.5 MMOL/L (ref 3.5–5.1)
POTASSIUM SERPL-SCNC: 3.6 MMOL/L (ref 3.5–5.1)
RBC # BLD AUTO: 3.89 M/UL (ref 4–5.4)
SODIUM SERPL-SCNC: 138 MMOL/L (ref 136–145)
SODIUM SERPL-SCNC: 139 MMOL/L (ref 136–145)
WBC # BLD AUTO: 6.49 K/UL (ref 3.9–12.7)

## 2022-12-07 PROCEDURE — 83735 ASSAY OF MAGNESIUM: CPT | Performed by: STUDENT IN AN ORGANIZED HEALTH CARE EDUCATION/TRAINING PROGRAM

## 2022-12-07 PROCEDURE — 36415 COLL VENOUS BLD VENIPUNCTURE: CPT | Performed by: STUDENT IN AN ORGANIZED HEALTH CARE EDUCATION/TRAINING PROGRAM

## 2022-12-07 PROCEDURE — 63600175 PHARM REV CODE 636 W HCPCS: Performed by: INTERNAL MEDICINE

## 2022-12-07 PROCEDURE — 93010 ELECTROCARDIOGRAM REPORT: CPT | Mod: ,,, | Performed by: INTERNAL MEDICINE

## 2022-12-07 PROCEDURE — 93010 EKG 12-LEAD: ICD-10-PCS | Mod: ,,, | Performed by: INTERNAL MEDICINE

## 2022-12-07 PROCEDURE — 99233 SBSQ HOSP IP/OBS HIGH 50: CPT | Mod: ,,, | Performed by: INTERNAL MEDICINE

## 2022-12-07 PROCEDURE — 99233 PR SUBSEQUENT HOSPITAL CARE,LEVL III: ICD-10-PCS | Mod: ,,, | Performed by: INTERNAL MEDICINE

## 2022-12-07 PROCEDURE — 25000003 PHARM REV CODE 250: Performed by: INTERNAL MEDICINE

## 2022-12-07 PROCEDURE — 80048 BASIC METABOLIC PNL TOTAL CA: CPT | Performed by: STUDENT IN AN ORGANIZED HEALTH CARE EDUCATION/TRAINING PROGRAM

## 2022-12-07 PROCEDURE — 25000003 PHARM REV CODE 250: Performed by: STUDENT IN AN ORGANIZED HEALTH CARE EDUCATION/TRAINING PROGRAM

## 2022-12-07 PROCEDURE — 93005 ELECTROCARDIOGRAM TRACING: CPT

## 2022-12-07 PROCEDURE — 84100 ASSAY OF PHOSPHORUS: CPT | Performed by: STUDENT IN AN ORGANIZED HEALTH CARE EDUCATION/TRAINING PROGRAM

## 2022-12-07 PROCEDURE — 85025 COMPLETE CBC W/AUTO DIFF WBC: CPT | Performed by: STUDENT IN AN ORGANIZED HEALTH CARE EDUCATION/TRAINING PROGRAM

## 2022-12-07 RX ORDER — POTASSIUM CHLORIDE 750 MG/1
30 CAPSULE, EXTENDED RELEASE ORAL
Status: COMPLETED | OUTPATIENT
Start: 2022-12-07 | End: 2022-12-09

## 2022-12-07 RX ORDER — DOFETILIDE 0.25 MG/1
250 CAPSULE ORAL EVERY 12 HOURS
Status: DISCONTINUED | OUTPATIENT
Start: 2022-12-07 | End: 2022-12-09 | Stop reason: HOSPADM

## 2022-12-07 RX ORDER — MAGNESIUM SULFATE HEPTAHYDRATE 40 MG/ML
2 INJECTION, SOLUTION INTRAVENOUS
Status: COMPLETED | OUTPATIENT
Start: 2022-12-07 | End: 2022-12-07

## 2022-12-07 RX ADMIN — MAGNESIUM SULFATE 2 G: 2 INJECTION INTRAVENOUS at 11:12

## 2022-12-07 RX ADMIN — MONTELUKAST 10 MG: 10 TABLET, FILM COATED ORAL at 08:12

## 2022-12-07 RX ADMIN — DOFETILIDE 250 MCG: 0.25 CAPSULE ORAL at 08:12

## 2022-12-07 RX ADMIN — THERA TABS 1 TABLET: TAB at 08:12

## 2022-12-07 RX ADMIN — MAGNESIUM SULFATE 2 G: 2 INJECTION INTRAVENOUS at 10:12

## 2022-12-07 RX ADMIN — POTASSIUM CHLORIDE 30 MEQ: 10 CAPSULE, COATED, EXTENDED RELEASE ORAL at 10:12

## 2022-12-07 RX ADMIN — APIXABAN 5 MG: 5 TABLET, FILM COATED ORAL at 09:12

## 2022-12-07 RX ADMIN — SENNOSIDES AND DOCUSATE SODIUM 1 TABLET: 50; 8.6 TABLET ORAL at 08:12

## 2022-12-07 RX ADMIN — METHIMAZOLE 5 MG: 5 TABLET ORAL at 08:12

## 2022-12-07 RX ADMIN — DOFETILIDE 250 MCG: 0.25 CAPSULE ORAL at 09:12

## 2022-12-07 RX ADMIN — MAGNESIUM SULFATE 2 G: 2 INJECTION INTRAVENOUS at 01:12

## 2022-12-07 RX ADMIN — APIXABAN 5 MG: 5 TABLET, FILM COATED ORAL at 08:12

## 2022-12-07 NOTE — PROGRESS NOTES
[facility]  Cardiology  Progress Note    Patient Name: Roxane Junior  MRN: [id number]  Admission Date: 12/6/2022  Hospital Length of Stay: 0 days  Code Status: Full Code   Attending Physician: Shala Forbes MD   Primary Care Physician: Dinesh Pacheco MD  Expected Discharge Date:   Principal Problem:Atrial fibrillation    Subjective:     Interval History: Tolerated first doses of tikosyn. Telemetry and ECG reviewed. Baseline QT 440ms progressed to 446ms then 520ms.       Objective:     Vital Signs (Most Recent):  Temp: 97.7 °F (36.5 °C) (12/07/22 0425)  Pulse: (!) 39 (12/07/22 0716)  Resp: 17 (12/07/22 0425)  BP: 117/71 (12/07/22 0425)  SpO2: 97 % (12/07/22 0425)   Vital Signs (24h Range):  Temp:  [97.6 °F (36.4 °C)-98.8 °F (37.1 °C)] 97.7 °F (36.5 °C)  Pulse:  [39-63] 39  Resp:  [11-34] 17  SpO2:  [96 %-100 %] 97 %  BP: (100-126)/(57-79) 117/71     Weight: 134.9 kg (297 lb 6.4 oz)  Body mass index is 51.05 kg/m².     SpO2: 97 %  O2 Device (Oxygen Therapy): room air      Intake/Output Summary (Last 24 hours) at 12/7/2022 1024  Last data filed at 12/7/2022 0842  Gross per 24 hour   Intake 2102 ml   Output 575 ml   Net 1527 ml       Lines/Drains/Airways       Peripheral Intravenous Line  Duration                  Peripheral IV - Single Lumen 12/06/22 0937 20 G Right Forearm 1 day                    Physical Exam  Vitals reviewed.   Constitutional:       General: She is not in acute distress.     Appearance: She is well-developed. She is not diaphoretic.   HENT:      Head: Normocephalic and atraumatic.   Eyes:      General:         Right eye: No discharge.         Left eye: No discharge.      Conjunctiva/sclera: Conjunctivae normal.   Cardiovascular:      Rate and Rhythm: sinus bradycardia present.      Heart sounds: No murmur heard.    No friction rub. No gallop. .  Pulmonary:      Effort: Pulmonary effort is normal. No respiratory distress.      Breath sounds: Normal breath sounds. No wheezing  or rales.   Abdominal:      General: Bowel sounds are normal. There is no distension.      Palpations: Abdomen is soft.      Tenderness: There is no abdominal tenderness.   Musculoskeletal:      Cervical back: Neck supple.   Skin:     General: Skin is warm and dry.   Neurological:      Mental Status: She is alert and oriented to person, place, and time.   Psychiatric:         Behavior: Behavior normal.         Thought Content: Thought content normal.         Judgment: Judgment normal.       Significant Labs:   Recent Lab Results         12/07/22  0257   12/06/22  2358        ANION GAP 8   8       Baso # 0.07         Basophil % 1.1         BUN 19   16       Calcium 9.6   10.0       Chloride 105   104       CO2 25   27       Creatinine 1.2   1.0       Differential Method Automated         eGFR 50.9   >60.0       Eos # 0.2         Eosinophil % 3.7         Glucose 99   95       Gran # (ANC) 2.8         Gran % 43.2         HEMATOCRIT 36.1         HEMOGLOBIN 11.8         Immature Grans (Abs) 0.01  Comment: Mild elevation in immature granulocytes is non specific and   can be seen in a variety of conditions including stress response,   acute inflammation, trauma and pregnancy. Correlation with other   laboratory and clinical findings is essential.           Immature Granulocytes 0.2         Lymph # 2.6         Lymph % 40.4         Magnesium 1.5         MCH 30.3         MCHC 32.7         MCV 93         Mono # 0.7         Mono % 11.4         MPV 12.2         nRBC 0         Phosphorus 3.4         Platelets 193         Potassium 3.5   3.6       RBC 3.89         RDW 14.5         Sodium 138   139       WBC 6.49                     Assessment and Plan:       * Atrial fibrillation  Ms. Junior is a 62 year old W w/ PMHx with hypertension, morbid obesity, asymptomatic sinus bradycardia, and paroxysmal tachycardia now recently diagnosed with atrial fibrillation with RVR. She was admitted to [facility] today after scheduled MIRIAM/DCCV due to  AF with plan for Tikosyn post procedure with Dr. Andrade.      Recommendations:  -ECG reviewed. Based on QT the dose of dofetilide decreased to 250mg BID  -Repeat ECG 2 hours post each dose of dofetilide  - HCTZ CI  - Continue eliquis 5mg BID  - Please contact us if any questions/concerns arrise.      Discussed w/ Dr. Andrade and EP fellow        VTE Risk Mitigation (From admission, onward)         Ordered     apixaban tablet 5 mg  2 times daily         12/06/22 7620                Dodie Aguirre MD  Cardiology  [facility] - Cardiology Stepdown

## 2022-12-07 NOTE — CONSULTS
[facility] - Cardiology Stepdown  Cardiac Electrophysiology  Consult Note    Admission Date: 12/6/2022  Code Status: Full Code   Attending Provider: Shala Forbes MD  Consulting Provider: Dodie Aguirre MD  Principal Problem:<principal problem not specified>    Consults  Subjective:     Chief Complaint:  AF    HPI:   Ms. Junior is a 62 year old W w/ PMHx with hypertension, morbid obesity, asymptomatic sinus bradycardia, and paroxysmal tachycardia now recently diagnosed with atrial fibrillation with RVR. She was admitted to [facility] [place] today after scheduled MIRIAM/DCCV due to AF with plan for Tikosyn post procedure with Dr. Andrade. Pt seen in recovery area. Denied any chest pain, SOB, RIZVI, dizziness, light headedness, weakness, LE swelling, syncope, or near syncopal episodes.  She is currently taking Eliquis 5 mg BID (last dose on 12/6 AM).     Cardioversion 11/23/22: successfully performed with restoration of normal sinus rhythm  MIRIAM 11/23/22   The left ventricle is normal in size with Moderately decreased systolic function.The estimated ejection fraction is 40% (at a heart rate of 150bpm)   Normal right ventricular size with mildly to moderately reduced right ventricular systolic function.   No interatrial septal defect present.   Normal appearing left atrial appendage. No thrombus is present in the appendage.   Grade 2 plaque present.   The estimated ejection fraction is 40%.   Mild mitral regurgitation.   Left ventricular diastolic dysfunction.    TTE 9/2022:    The left ventricle is normal in size with concentric hypertrophy and normal systolic function.   Moderate left atrial enlargement.   Normal central venous pressure (3 mmHg).   The estimated PA systolic pressure is 30 mmHg.   The estimated ejection fraction is 62%.   Indeterminate left ventricular diastolic function.   Normal right ventricular size with normal right ventricular systolic function.      Past Medical History:   Diagnosis Date     Atrial fibrillation     Hypertension     Hyperthyroidism        Past Surgical History:   Procedure Laterality Date    TUBAL LIGATION         Review of patient's allergies indicates:  No Known Allergies    No current facility-administered medications on file prior to encounter.     Current Outpatient Medications on File Prior to Encounter   Medication Sig    amLODIPine (NORVASC) 10 MG tablet Take 10 mg by mouth once daily.    apixaban (ELIQUIS) 5 mg Tab Take 1 tablet (5 mg total) by mouth 2 (two) times daily.    montelukast (SINGULAIR) 10 mg tablet Take 10 mg by mouth once daily. PRN    multivitamin capsule Take 1 capsule by mouth once daily.    triamterene-hydrochlorothiazide 37.5-25 mg (MAXZIDE-25) 37.5-25 mg per tablet Take 1 tablet by mouth once daily.    methIMAzole (TAPAZOLE) 5 MG Tab Take 5 mg by mouth once daily.     Family History    None       Tobacco Use    Smoking status: Never    Smokeless tobacco: Never   Substance and Sexual Activity    Alcohol use: Yes     Comment: occ    Drug use: Never    Sexual activity: Not on file     ROS  Constitutional: Negative for fever and malaise/fatigue.   HENT:  Negative for congestion and sore throat.    Eyes:  Negative for blurred vision and visual disturbance.   Cardiovascular:  Positive for palpitations. Negative for chest pain, dyspnea on exertion, irregular heartbeat, near-syncope and syncope.   Respiratory:  Negative for cough and shortness of breath.    Hematologic/Lymphatic: Negative for bleeding problem. Does not bruise/bleed easily.   Skin: Negative.    Musculoskeletal: Negative.    Gastrointestinal:  Negative for bloating, abdominal pain, hematochezia and melena.   Neurological:  Negative for focal weakness and weakness.   Psychiatric/Behavioral: Negative.       Objective:     Vital Signs (Most Recent):  Temp: 97.7 °F (36.5 °C) (12/06/22 1924)  Pulse: (!) 45 (12/06/22 1924)  Resp: 17 (12/06/22 1924)  BP: 124/60 (12/06/22 1924)  SpO2: 98 %  (12/06/22 1924)   Vital Signs (24h Range):  Temp:  [97.3 °F (36.3 °C)-98.8 °F (37.1 °C)] 97.7 °F (36.5 °C)  Pulse:  [41-90] 45  Resp:  [11-34] 17  SpO2:  [97 %-100 %] 98 %  BP: ()/(57-83) 124/60       Weight: 133.7 kg (294 lb 12.1 oz)  Body mass index is 50.59 kg/m².    SpO2: 98 %  O2 Device (Oxygen Therapy): room air    Physical Exam  Vitals reviewed.   Constitutional:       General: She is not in acute distress.     Appearance: She is well-developed. She is not diaphoretic.   HENT:      Head: Normocephalic and atraumatic.   Eyes:      General:         Right eye: No discharge.         Left eye: No discharge.      Conjunctiva/sclera: Conjunctivae normal.   Cardiovascular:      Rate and Rhythm: Tachycardia present. Rhythm irregularly irregular.      Heart sounds: No murmur heard.    No friction rub. No gallop. .  Pulmonary:      Effort: Pulmonary effort is normal. No respiratory distress.      Breath sounds: Normal breath sounds. No wheezing or rales.   Abdominal:      General: Bowel sounds are normal. There is no distension.      Palpations: Abdomen is soft.      Tenderness: There is no abdominal tenderness.   Musculoskeletal:      Cervical back: Neck supple.   Skin:     General: Skin is warm and dry.   Neurological:      Mental Status: She is alert and oriented to person, place, and time.   Psychiatric:         Behavior: Behavior normal.         Thought Content: Thought content normal.         Judgment: Judgment normal.       Significant Labs:   Recent Lab Results         12/06/22  1026        Ascending aorta 3.10       BSA 2.44       EF 40       Sinus 3.00       STJ 2.90               Assessment and Plan:     Atrial fibrillation  Ms. Junior is a 62 year old W w/ PMHx with hypertension, morbid obesity, asymptomatic sinus bradycardia, and paroxysmal tachycardia now recently diagnosed with atrial fibrillation with RVR. She was admitted to [facility] [place] today after scheduled MIRIAM/DCCV due to AF with plan for Tikosyn post  procedure with Dr. Andrade.     Recommendations:  -Start dofetilide 500mg BID- ordered set added  -Repeat ECG 2 hours post each dose of dofetilide  - Continue eliquis 5mg BID  - Please contact us if any questions/concerns arrise.     Discussed w/ Dr. Andrade and EP fellow        Thank you for your consult. I will follow-up with patient. Please contact us if you have any additional questions.    Dodie Aguirre MD  Cardiac Electrophysiology  [facility] - Cardiology Stepdown

## 2022-12-07 NOTE — HPI
Ms. Junior is a 62 year old W w/ PMHx with hypertension, morbid obesity, asymptomatic sinus bradycardia, and paroxysmal tachycardia now recently diagnosed with atrial fibrillation with RVR. She was [place] [facility] [place] after scheduled MIRIAM/DCCV due to AF with plan for Tikosyn post procedure with Dr. Andrade. Denied any chest pain, SOB, RIZVI, dizziness, light headedness, weakness, LE swelling, syncope, or near syncopal episodes.

## 2022-12-07 NOTE — NURSING
Spoke with Dr Forbes concerning labs for pt prior to starting Tikosyn tonight. Pt had no labs drawn this AM. MD stated OK to wait for AM labs.

## 2022-12-07 NOTE — NURSING
Patient admitted to CSU. Patient arrived to floor from SSCU no evidence of distress; patient AAO x4 at this time. Patient placed on tele. Vital signs obtained. Patient voices no complaints at this time. Plan of care initiated with patient. Bed in lowest position, locked, SR up x2, call bell in reach.

## 2022-12-07 NOTE — PLAN OF CARE
Patient free from falls and injuries throughout shift.  AAO X 4 and VSS. SB on tele, HR in the 40s. Asymptomatic. Qtc less than 500 after start of Tikosyn, No changes to dose at this time.  No acute events overnight. Patient resting well at this time.  Plan of care discussed with patient.  Patient verbalizes understanding.  Will continue to monitor.

## 2022-12-07 NOTE — ANESTHESIA POSTPROCEDURE EVALUATION
Anesthesia Post Evaluation    Patient: Roxane Junior    Procedure(s) Performed: Procedure(s) (LRB):  CARDIOVERSION (N/A)  Transesophageal echo (MIRIAM) intra-procedure log documentation (N/A)    Final Anesthesia Type: general      Patient location during evaluation: PACU  Patient participation: Yes- Able to Participate  Level of consciousness: awake and alert  Post-procedure vital signs: reviewed and stable  Pain management: adequate  Airway patency: patent    PONV status at discharge: No PONV  Anesthetic complications: no      Cardiovascular status: blood pressure returned to baseline  Respiratory status: unassisted  Hydration status: euvolemic  Follow-up not needed.          Vitals Value Taken Time   /63 12/07/22 1529   Temp 36.4 °C (97.6 °F) 12/07/22 1529   Pulse 37 12/07/22 1529   Resp 14 12/07/22 1529   SpO2 94 % 12/07/22 1529         No case tracking events are documented in the log.      Pain/Chris Score: Chris Score: 10 (12/6/2022 12:00 PM)

## 2022-12-07 NOTE — ASSESSMENT & PLAN NOTE
Ms. Junior is a 62 year old W w/ PMHx with hypertension, morbid obesity, asymptomatic sinus bradycardia, and paroxysmal tachycardia now recently diagnosed with atrial fibrillation with RVR. [place] [facility] [place] scheduled MIRIAM/DCCV due to AF with plan for Tikosyn post procedure with Dr. Andrade.      Recommendations:  -ECG reviewed. Based on QT the dose of dofetilide decreased to 250mg BID  -Repeat ECG 2 hours post each dose of dofetilide  - HCTZ CI  - Continue eliquis 5mg BID  - Please contact us if any questions/concerns arrise.      Discussed w/ Dr. Andrade and EP fellow

## 2022-12-07 NOTE — SUBJECTIVE & OBJECTIVE
Interval History: Tolerated first doses of tikosyn. Telemetry and ECG reviewed. Baseline QT 440ms progressed to 446ms then 520ms.       Objective:     Vital Signs (Most Recent):  Temp: 97.7 °F (36.5 °C) (12/07/22 0425)  Pulse: (!) 39 (12/07/22 0716)  Resp: 17 (12/07/22 0425)  BP: 117/71 (12/07/22 0425)  SpO2: 97 % (12/07/22 0425)   Vital Signs (24h Range):  Temp:  [97.6 °F (36.4 °C)-98.8 °F (37.1 °C)] 97.7 °F (36.5 °C)  Pulse:  [39-63] 39  Resp:  [11-34] 17  SpO2:  [96 %-100 %] 97 %  BP: (100-126)/(57-79) 117/71     Weight: 134.9 kg (297 lb 6.4 oz)  Body mass index is 51.05 kg/m².     SpO2: 97 %  O2 Device (Oxygen Therapy): room air      Intake/Output Summary (Last 24 hours) at 12/7/2022 1024  Last data filed at 12/7/2022 0842  Gross per 24 hour   Intake 2102 ml   Output 575 ml   Net 1527 ml       Lines/Drains/Airways       Peripheral Intravenous Line  Duration                  Peripheral IV - Single Lumen 12/06/22 0937 20 G Right Forearm 1 day                    Physical Exam  Vitals reviewed.   Constitutional:       General: She is not in acute distress.     Appearance: She is well-developed. She is not diaphoretic.   HENT:      Head: Normocephalic and atraumatic.   Eyes:      General:         Right eye: No discharge.         Left eye: No discharge.      Conjunctiva/sclera: Conjunctivae normal.   Cardiovascular:      Rate and Rhythm: sinus bradycardia present.      Heart sounds: No murmur heard.    No friction rub. No gallop. .  Pulmonary:      Effort: Pulmonary effort is normal. No respiratory distress.      Breath sounds: Normal breath sounds. No wheezing or rales.   Abdominal:      General: Bowel sounds are normal. There is no distension.      Palpations: Abdomen is soft.      Tenderness: There is no abdominal tenderness.   Musculoskeletal:      Cervical back: Neck supple.   Skin:     General: Skin is warm and dry.   Neurological:      Mental Status: She is alert and oriented to person, place, and time.    Psychiatric:         Behavior: Behavior normal.         Thought Content: Thought content normal.         Judgment: Judgment normal.       Significant Labs:   Recent Lab Results         12/07/22  0257   12/06/22  2358        ANION GAP 8   8       Baso # 0.07         Basophil % 1.1         BUN 19   16       Calcium 9.6   10.0       Chloride 105   104       CO2 25   27       Creatinine 1.2   1.0       Differential Method Automated         eGFR 50.9   >60.0       Eos # 0.2         Eosinophil % 3.7         Glucose 99   95       Gran # (ANC) 2.8         Gran % 43.2         HEMATOCRIT 36.1         HEMOGLOBIN 11.8         Immature Grans (Abs) 0.01  Comment: Mild elevation in immature granulocytes is non specific and   can be seen in a variety of conditions including stress response,   acute inflammation, trauma and pregnancy. Correlation with other   laboratory and clinical findings is essential.           Immature Granulocytes 0.2         Lymph # 2.6         Lymph % 40.4         Magnesium 1.5         MCH 30.3         MCHC 32.7         MCV 93         Mono # 0.7         Mono % 11.4         MPV 12.2         nRBC 0         Phosphorus 3.4         Platelets 193         Potassium 3.5   3.6       RBC 3.89         RDW 14.5         Sodium 138   139       WBC 6.49

## 2022-12-07 NOTE — PROGRESS NOTES
12/07/22 0811 12/07/22 1115 12/07/22 1153   Vital Signs   Temp src Oral  --  Oral   Pulse (!) 38 (!) 43 (!) 44   Heart Rate Source Monitor  --  Monitor   Resp 18  --  13   SpO2 97 %  --  100 %   O2 Device (Oxygen Therapy) room air  --   --    /71  --  (!) 113/58   MAP (mmHg) 77  --  79   BP Location Left arm  --  Right arm   BP Method Automatic  --   --    Patient Position Lying  --  Sitting     Patient's heart rate has been sustaining in mid 30's to low 40's. Pacer pads at bedside.She is without any symptoms. Dr. Forbes and EP fellow Dr. RODRIGO Alberts notified. New EKG obtained. EP fellow reviewed EKG and patient. No new orders will continue plan of care, as patient has chronic bradycardia.

## 2022-12-07 NOTE — ASSESSMENT & PLAN NOTE
Ms. Junior is a 62 year old W w/ PMHx with hypertension, morbid obesity, asymptomatic sinus bradycardia, and paroxysmal tachycardia now recently diagnosed with atrial fibrillation with RVR. [place] [facility] [place] MIRIAM/DCCV due to AF with plan for Tikosyn post procedure with Dr. Andrade.     Recommendations:  -Start dofetilide 500mg BID- ordered set added  -Repeat ECG 2 hours post each dose of dofetilide  - Continue eliquis 5mg BID  - Please contact us if any questions/concerns arrise.     Discussed w/ Dr. Andrade and EP fellow

## 2022-12-07 NOTE — HPI
Ms. Junior is a 62 year old W w/ PMHx with hypertension, morbid obesity, asymptomatic sinus bradycardia, and paroxysmal tachycardia now recently diagnosed with atrial fibrillation with RVR. She was admitted to [facility] [place] today after scheduled MIRIAM/DCCV due to AF with plan for Tikosyn post procedure with Dr. Andrade. Pt seen in recovery area. Denied any chest pain, SOB, RIZVI, dizziness, light headedness, weakness, LE swelling, syncope, or near syncopal episodes.  She is currently taking Eliquis 5 mg BID (last dose on 12/6 AM).     Cardioversion 11/23/22: successfully performed with restoration of normal sinus rhythm  MIRIAM 11/23/22  The left ventricle is normal in size with Moderately decreased systolic function.The estimated ejection fraction is 40% (at a heart rate of 150bpm)  Normal right ventricular size with mildly to moderately reduced right ventricular systolic function.  No interatrial septal defect present.  Normal appearing left atrial appendage. No thrombus is present in the appendage.  Grade 2 plaque present.  The estimated ejection fraction is 40%.  Mild mitral regurgitation.  Left ventricular diastolic dysfunction.    TTE 9/2022:   The left ventricle is normal in size with concentric hypertrophy and normal systolic function.  Moderate left atrial enlargement.  Normal central venous pressure (3 mmHg).  The estimated PA systolic pressure is 30 mmHg.  The estimated ejection fraction is 62%.  Indeterminate left ventricular diastolic function.  Normal right ventricular size with normal right ventricular systolic function.

## 2022-12-07 NOTE — ASSESSMENT & PLAN NOTE
Dofetilide 500 mcg BID  apixaban 5 mg daily BID  S/p MIRIAM/ DCCV  Dr. Andrade, EP Cardiology following  12/8- chatted w Cardiology team. If all ok will dc after one more dose tomorrow

## 2022-12-07 NOTE — SUBJECTIVE & OBJECTIVE
Past Medical History:   Diagnosis Date    Atrial fibrillation     Hypertension     Hyperthyroidism        Past Surgical History:   Procedure Laterality Date    TUBAL LIGATION         Review of patient's allergies indicates:  No Known Allergies    No current facility-administered medications on file prior to encounter.     Current Outpatient Medications on File Prior to Encounter   Medication Sig    amLODIPine (NORVASC) 10 MG tablet Take 10 mg by mouth once daily.    apixaban (ELIQUIS) 5 mg Tab Take 1 tablet (5 mg total) by mouth 2 (two) times daily.    montelukast (SINGULAIR) 10 mg tablet Take 10 mg by mouth once daily. PRN    multivitamin capsule Take 1 capsule by mouth once daily.    triamterene-hydrochlorothiazide 37.5-25 mg (MAXZIDE-25) 37.5-25 mg per tablet Take 1 tablet by mouth once daily.    methIMAzole (TAPAZOLE) 5 MG Tab Take 5 mg by mouth once daily.     Family History    None       Tobacco Use    Smoking status: Never    Smokeless tobacco: Never   Substance and Sexual Activity    Alcohol use: Yes     Comment: occ    Drug use: Never    Sexual activity: Not on file     ROS  Constitutional: Negative for fever and malaise/fatigue.   HENT:  Negative for congestion and sore throat.    Eyes:  Negative for blurred vision and visual disturbance.   Cardiovascular:  Positive for palpitations. Negative for chest pain, dyspnea on exertion, irregular heartbeat, near-syncope and syncope.   Respiratory:  Negative for cough and shortness of breath.    Hematologic/Lymphatic: Negative for bleeding problem. Does not bruise/bleed easily.   Skin: Negative.    Musculoskeletal: Negative.    Gastrointestinal:  Negative for bloating, abdominal pain, hematochezia and melena.   Neurological:  Negative for focal weakness and weakness.   Psychiatric/Behavioral: Negative.       Objective:     Vital Signs (Most Recent):  Temp: 97.7 °F (36.5 °C) (12/06/22 1924)  Pulse: (!) 45 (12/06/22 1924)  Resp: 17 (12/06/22 1924)  BP: 124/60  (12/06/22 1924)  SpO2: 98 % (12/06/22 1924)   Vital Signs (24h Range):  Temp:  [97.3 °F (36.3 °C)-98.8 °F (37.1 °C)] 97.7 °F (36.5 °C)  Pulse:  [41-90] 45  Resp:  [11-34] 17  SpO2:  [97 %-100 %] 98 %  BP: ()/(57-83) 124/60       Weight: 133.7 kg (294 lb 12.1 oz)  Body mass index is 50.59 kg/m².    SpO2: 98 %  O2 Device (Oxygen Therapy): room air    Physical Exam  Vitals reviewed.   Constitutional:       General: She is not in acute distress.     Appearance: She is well-developed. She is not diaphoretic.   HENT:      Head: Normocephalic and atraumatic.   Eyes:      General:         Right eye: No discharge.         Left eye: No discharge.      Conjunctiva/sclera: Conjunctivae normal.   Cardiovascular:      Rate and Rhythm: Tachycardia present. Rhythm irregularly irregular.      Heart sounds: No murmur heard.    No friction rub. No gallop. .  Pulmonary:      Effort: Pulmonary effort is normal. No respiratory distress.      Breath sounds: Normal breath sounds. No wheezing or rales.   Abdominal:      General: Bowel sounds are normal. There is no distension.      Palpations: Abdomen is soft.      Tenderness: There is no abdominal tenderness.   Musculoskeletal:      Cervical back: Neck supple.   Skin:     General: Skin is warm and dry.   Neurological:      Mental Status: She is alert and oriented to person, place, and time.   Psychiatric:         Behavior: Behavior normal.         Thought Content: Thought content normal.         Judgment: Judgment normal.       Significant Labs:   Recent Lab Results         12/06/22  1026        Ascending aorta 3.10       BSA 2.44       EF 40       Sinus 3.00       STJ 2.90

## 2022-12-07 NOTE — PROGRESS NOTES
Hospital Medicine  Progress note    Team: [facility] Shala Forbes MD  Admit Date: 12/6/2022    Principal Problem:  Atrial fibrillation    Interval hx:  no new complaints. Concerned about nurses' concern about bradycardia    ROS   Respiratory: neg for cough neg for shortness of breath  Cardiovascular: neg for chest pain neg for palpitations  Gastrointestinal: neg for nausea neg for vomiting, neg for abdominal pain neg for diarrhea neg for constipation   Behavioral/Psych: neg for depression neg for anxiety    PEx  Temp:  [97.4 °F (36.3 °C)-98.8 °F (37.1 °C)]   Pulse:  [38-63]   Resp:  [13-22]   BP: (110-124)/(57-71)   SpO2:  [96 %-100 %]     Intake/Output Summary (Last 24 hours) at 12/7/2022 1406  Last data filed at 12/7/2022 0842  Gross per 24 hour   Intake 1302 ml   Output 575 ml   Net 727 ml       General Appearance: no acute distress, WD, obese  Heart: vu, regular rate and rhythm, no heave  Respiratory: Normal respiratory effort, symmetric excursion, bilateral vesicular breath sounds   Abdomen: Soft, non-tender; bowel sounds active  Skin: intact, no rash, no ulcers  Neurologic:  No focal numbness or weakness  Mental status: Alert, oriented x 4, affect appropriate     Recent Labs   Lab 12/07/22  0257   WBC 6.49   HGB 11.8*   HCT 36.1*        Recent Labs   Lab 12/06/22  2358 12/07/22  0257    138   K 3.6 3.5    105   CO2 27 25   BUN 16 19   CREATININE 1.0 1.2   GLU 95 99   CALCIUM 10.0 9.6   MG  --  1.5*   PHOS  --  3.4   Scheduled Meds:   apixaban  5 mg Oral BID    dofetilide  250 mcg Oral Q12H    magnesium sulfate IVPB  2 g Intravenous Q2H    methIMAzole  5 mg Oral Daily    montelukast  10 mg Oral Daily    multivitamin  1 tablet Oral Daily    potassium chloride  30 mEq Oral with breakfast    senna-docusate 8.6-50 mg  1 tablet Oral BID     Continuous Infusions:  As Needed:  acetaminophen, melatonin, naloxone, sodium chloride 0.9%, sodium chloride 0.9%    Assessment and Plan  / Problems  managed today    * Atrial fibrillation  Dofetilide 500 mcg BID  apixaban 5 mg daily BID    Chronic sinusitis  Montelukast 10 mg daily    Hyperthyroidism  Methimazole 5 mg daily    Essential hypertension  Amlodipine 10 mg daily and triamterene-HCTZ  BP soft - hold antihypertensives for now  HCTZ is contraindicated while being use dofetilide    Bradycardia on ECG  Baseline  No acute interventions required now    Discharge Planning   ISAURA: 12/9/2022     Code Status: Full Code   Is the patient medically ready for discharge?: No    Reason for patient still in hospital (select all that apply): Patient trending condition and Treatment         Diet:  regular diet  GI PPx: not needed  DVT PPx:  apixaban  Airways: room air  Wounds: none    Goals of Care:  Return to prior functional status     Time (minutes) spent in care of the patient (Greater than 1/2 spent in direct face-to-face contact and care coordination on unit)  35 min    Shala Forbes MD

## 2022-12-07 NOTE — ASSESSMENT & PLAN NOTE
Amlodipine 10 mg daily and triamterene-HCTZ  BP soft - hold antihypertensives for now  HCTZ is contraindicated while being use dofetilide

## 2022-12-07 NOTE — H&P
Hospital Medicine  History and Physical Exam    Team: [facility] HOSP MED Z Shala Forbes MD  Admit Date: 12/6/2022  ISAURA   Principal Problem:  Atrial fibrillation   Patient information was obtained from patient and past medical records.   Primary care Physician: Dinesh Pacheco MD  Code status: Full Code    HPI: Ms. Junior is a 62 year old W w/ PMHx with hypertension, morbid obesity, asymptomatic sinus bradycardia, and paroxysmal tachycardia now recently diagnosed with atrial fibrillation with RVR. She was admitted to [facility] today after scheduled MIRIAM/DCCV due to AF with plan for Tikosyn post procedure with Dr. Andarde. Denied any chest pain, SOB, RIZVI, dizziness, light headedness, weakness, LE swelling, syncope, or near syncopal episodes.     Past Medical History: Patient has a past medical history of Atrial fibrillation, Hypertension, and Hyperthyroidism.    Past Surgical History: Patient has a past surgical history that includes Tubal ligation.    Social History: Patient reports that she has never smoked. She has never used smokeless tobacco. She reports current alcohol use. She reports that she does not use drugs.    Family History: family history is not on file.    Medications:   Prior to Admission medications    Medication Sig Start Date End Date Taking? Authorizing Provider   amLODIPine (NORVASC) 10 MG tablet Take 10 mg by mouth once daily. 4/15/21  Yes Historical Provider   apixaban (ELIQUIS) 5 mg Tab Take 1 tablet (5 mg total) by mouth 2 (two) times daily. 11/22/22  Yes Prabhu Andrade MD   montelukast (SINGULAIR) 10 mg tablet Take 10 mg by mouth once daily. PRN 5/19/21  Yes Historical Provider   multivitamin capsule Take 1 capsule by mouth once daily.   Yes Historical Provider   triamterene-hydrochlorothiazide 37.5-25 mg (MAXZIDE-25) 37.5-25 mg per tablet Take 1 tablet by mouth once daily.   Yes Historical Provider   methIMAzole (TAPAZOLE) 5 MG Tab Take 5 mg by mouth once daily. 11/27/22   Historical Provider      Allergies: Patient has No Known Allergies.    ROS  Constitutional: neg for fever or chills  Eyes: neg for visual changes  ENT: neg for nasal congestion or sore throat  Respiratory: neg for cough or shortness of breath  Cardiovascular: neg for chest pain or palpitations  Gastrointestinal: neg for nausea or vomiting or abdominal pain. Neg for change in bowel habits  Genitourinary: neg for hematuria or dysuria  Integument/Breast: neg for rash or pruritis  Hematologic/Lymphatic: neg for easy bruising neg for lymphadenopathy   Musculoskeletal: neg for arthralgias or myalgias  Neurological: neg for seizures or tremors  Endocrine: neg for heat or cold intolerance    PEx  Temp:  [97.3 °F (36.3 °C)-98.8 °F (37.1 °C)]   Pulse:  [41-90]   Resp:  [11-34]   BP: ()/(57-83)   SpO2:  [97 %-100 %]   Body mass index is 50.59 kg/m².     General: well developed, well nourished, neg for acute distress  Eyes: conjunctivae/corneas clear.   Head: normocephalic, atraumatic.   Neck: supple, symmetrical, trachea midline, neg for JVD, neg for carotid bruits  Lungs: clear to auscultation bilaterally, normal respiratory effort  Heart: regular rate and rhythm, neg for murmur  Extremities: neg for edema, neg for joint swelling, pulses: 2+ at ankles   Abdomen: Soft, non-tender; liver and spleen not palpable, bowel sounds active, neg for aortic bruits  Skin: Skin color, texture, turgor normal. Neg for rashes or lesions.   Neurologic: CN II-XII grossly intact, DTR: 2/4 bilaterally. Normal muscle strength and tone. Neg for focal numbness or weakness  Mental status: Alert, oriented x 4, affect appropriate, memory intact     Latest Reference Range & Units 11/29/22 16:06   WBC 3.8 - 10.8 Thousand/uL 6.5   RBC 3.80 - 5.10 Million/uL 5.11 (H)   HEMOGLOBIN 11.7 - 15.5 g/dL 15.4   HEMATOCRIT 35.0 - 45.0 % 46.2 (H)   MCV 80.0 - 100.0 fL 90.4   MCH 27.0 - 33.0 pg 30.1   MCHC 32.0 - 36.0 g/dL 33.3   RDW 11.0 - 15.0 % 13.8   Platelets 140 - 400  Thousand/uL 266   MPV 7.5 - 12.5 fL 12.3   Neutrophils Relative % 41.6   Lymph % % 44.4   Mono % % 9.4   Eosinophil % % 3.2   Basophil % % 1.4   Neutrophils, Abs 1,500 - 7,800 cells/uL 2,704   Lymph # 850 - 3,900 cells/uL 2,886   Mono # 200 - 950 cells/uL 611   Eos # 15 - 500 cells/uL 208   Baso # 0 - 200 cells/uL 91   Protime 9.0 - 11.5 sec 12.0 (H)   INR  1.2 (H)   aPTT 23 - 32 sec 33 (H)   Sodium 135 - 146 mmol/L 137   Potassium 3.5 - 5.3 mmol/L 3.9   Chloride 98 - 110 mmol/L 97 (L)   CO2 20 - 32 mmol/L 22   BUN 7 - 25 mg/dL 26 (H)   Creatinine 0.50 - 1.05 mg/dL 1.30 (H)   BUN/CREAT RATIO 6 - 22 (calc) 20   eGFR > OR = 60 mL/min/1.73m2 46 (L)   Glucose 65 - 99 mg/dL 53 (L)   Calcium 8.6 - 10.4 mg/dL 10.3   (H): Data is abnormally high  (L): Data is abnormally low      Assessment and Plan:    * Atrial fibrillation  Dofetilide 500 mcg BID  apixaban 5 mg daily BID    Chronic sinusitis  Montelukast 10 mg daily    Hyperthyroidism  Methimazole 5 mg daily    Essential hypertension  Amlodipine 10 mg daily and triamterene-HCTZ  BP soft - hold antihypertensives for now  HCTZ is contraindicated while being use dofetilide    Diet:  regular diet  GI PPx: not needed  DVT PPx:  apixaban  Airways: room air  Wounds: none    Goals of Care: Return to prior functional status  Discharge plan: home    Time (minutes) spent in care of the patient (Greater than 1/2 spent in direct face-to-face contact) 75 min    Shala Forbes MD

## 2022-12-07 NOTE — PLAN OF CARE
Problem: Adult Inpatient Plan of Care  Goal: Patient-Specific Goal (Individualized)  Outcome: Ongoing, Progressing  Flowsheets (Taken 12/6/2022 1816)  Anxieties, Fears or Concerns: new medication  Individualized Care Needs: EKG 2-3 hours after Tikosyn dosing  Patient-Specific Goals (Include Timeframe): QT remain <500 in EKG tonight.       Plan of care discussed with patient. Patient is free of fall/trauma/injury. Denies CP, SOB, or pain/discomfort. Plan to start Tikosyn tonight. All questions addressed.

## 2022-12-07 NOTE — PLAN OF CARE
This morning's post dofetilide EKG was reviewed, uncorrected QT interval is 490 msec. Ok to continue dofetilide 250 mcg BID. Notified by nursing that patient's heart rate in the high 30s-40s, EKG and tele showing sinus bradycardia without evidence of AV block or other conduction disease. Heart rate at chronic baseline and patient is hemodynamically stable and completely asymptomatic. Will monitor for the time being.     Tino Alberts MD  [facility]  Cardiovascular Disease, PGY-V

## 2022-12-08 PROBLEM — Z79.899 VISIT FOR MONITORING TIKOSYN THERAPY: Status: ACTIVE | Noted: 2022-12-08

## 2022-12-08 PROBLEM — R00.9 ABNORMAL HEART RATE: Status: ACTIVE | Noted: 2022-12-08

## 2022-12-08 PROBLEM — I50.30 HEART FAILURE WITH PRESERVED EJECTION FRACTION: Status: ACTIVE | Noted: 2022-12-08

## 2022-12-08 PROBLEM — Z51.81 VISIT FOR MONITORING TIKOSYN THERAPY: Status: ACTIVE | Noted: 2022-12-08

## 2022-12-08 PROCEDURE — 93010 EKG 12-LEAD: ICD-10-PCS | Mod: ,,, | Performed by: INTERNAL MEDICINE

## 2022-12-08 PROCEDURE — 20600001 HC STEP DOWN PRIVATE ROOM

## 2022-12-08 PROCEDURE — 93010 ELECTROCARDIOGRAM REPORT: CPT | Mod: ,,, | Performed by: INTERNAL MEDICINE

## 2022-12-08 PROCEDURE — 99233 PR SUBSEQUENT HOSPITAL CARE,LEVL III: ICD-10-PCS | Mod: ,,, | Performed by: HOSPITALIST

## 2022-12-08 PROCEDURE — 93005 ELECTROCARDIOGRAM TRACING: CPT

## 2022-12-08 PROCEDURE — 25000003 PHARM REV CODE 250: Performed by: INTERNAL MEDICINE

## 2022-12-08 PROCEDURE — 99233 SBSQ HOSP IP/OBS HIGH 50: CPT | Mod: ,,, | Performed by: HOSPITALIST

## 2022-12-08 PROCEDURE — 25000003 PHARM REV CODE 250: Performed by: STUDENT IN AN ORGANIZED HEALTH CARE EDUCATION/TRAINING PROGRAM

## 2022-12-08 RX ORDER — DOFETILIDE 0.25 MG/1
250 CAPSULE ORAL EVERY 12 HOURS
Qty: 60 CAPSULE | Refills: 11 | Status: ON HOLD | OUTPATIENT
Start: 2022-12-08 | End: 2023-06-21 | Stop reason: HOSPADM

## 2022-12-08 RX ADMIN — DOFETILIDE 250 MCG: 0.25 CAPSULE ORAL at 08:12

## 2022-12-08 RX ADMIN — SENNOSIDES AND DOCUSATE SODIUM 1 TABLET: 50; 8.6 TABLET ORAL at 10:12

## 2022-12-08 RX ADMIN — APIXABAN 5 MG: 5 TABLET, FILM COATED ORAL at 08:12

## 2022-12-08 RX ADMIN — APIXABAN 5 MG: 5 TABLET, FILM COATED ORAL at 10:12

## 2022-12-08 RX ADMIN — THERA TABS 1 TABLET: TAB at 08:12

## 2022-12-08 RX ADMIN — METHIMAZOLE 5 MG: 5 TABLET ORAL at 08:12

## 2022-12-08 RX ADMIN — DOFETILIDE 250 MCG: 0.25 CAPSULE ORAL at 10:12

## 2022-12-08 RX ADMIN — POTASSIUM CHLORIDE 30 MEQ: 10 CAPSULE, COATED, EXTENDED RELEASE ORAL at 08:12

## 2022-12-08 RX ADMIN — MONTELUKAST 10 MG: 10 TABLET, FILM COATED ORAL at 08:12

## 2022-12-08 NOTE — PROGRESS NOTES
[facility] - Cardiology [facility] Medicine  Progress Note    Patient Name: Roxane Junior  MRN: [id number]  Patient Class: OP- Outpatient Procedures   Admission Date: 12/6/2022  Length of Stay: 0 days  Attending Physician: Shelly Nathan MD  Primary Care Provider: Dinesh Pacheco MD        Subjective:     Principal Problem:Atrial fibrillation        HPI:  Ms. Junior is a 62 year old W w/ PMHx with hypertension, morbid obesity, asymptomatic sinus bradycardia, and paroxysmal tachycardia now recently diagnosed with atrial fibrillation with RVR. She was admitted to [facility] today after scheduled MIRIAM/DCCV due to AF with plan for Tikosyn post procedure with Dr. Andrade. Denied any chest pain, SOB, RIZVI, dizziness, light headedness, weakness, LE swelling, syncope, or near syncopal episodes.       Overview/Hospital Course:  Holding antihypertensives as BPs soft.  12/8- BP  95/51  Pulse  42 on Tikosyn therapy for her antiarrhythmia but she is still having a low HR.along with A-fib. Cardiology following. Plan for dc tomorrow if all ok.       Interval History: see above    Review of Systems   Constitutional:  Negative for activity change, chills, fatigue and fever.   HENT:  Negative for congestion, nosebleeds and trouble swallowing.    Respiratory:  Negative for apnea, cough, choking, chest tightness and shortness of breath.    Cardiovascular:  Negative for chest pain and leg swelling.   Gastrointestinal:  Negative for abdominal distention, abdominal pain, constipation, diarrhea, nausea and vomiting.   Genitourinary:  Negative for decreased urine volume, difficulty urinating, dysuria and frequency.   Musculoskeletal:  Negative for arthralgias, back pain, joint swelling, neck pain and neck stiffness.   Skin:  Negative for rash and wound.   Neurological:  Negative for dizziness, seizures, syncope, weakness, light-headedness, numbness and headaches.   Psychiatric/Behavioral:  Negative for agitation, behavioral problems,  confusion, hallucinations, self-injury and sleep disturbance. The patient is not nervous/anxious.    Objective:     Vital Signs (Most Recent):  Temp: 98.2 °F (36.8 °C) (12/08/22 0434)  Pulse: (!) 42 (12/08/22 0705)  Resp: 18 (12/08/22 0434)  BP: (!) 95/51 (12/08/22 0434)  SpO2: 100 % (12/08/22 0434)   Vital Signs (24h Range):  Temp:  [97.4 °F (36.3 °C)-98.3 °F (36.8 °C)] 98.2 °F (36.8 °C)  Pulse:  [35-48] 42  Resp:  [13-18] 18  SpO2:  [94 %-100 %] 100 %  BP: ()/(51-71) 95/51     Weight: 134.9 kg (297 lb 6.4 oz)  Body mass index is 51.05 kg/m².    Intake/Output Summary (Last 24 hours) at 12/8/2022 0707  Last data filed at 12/7/2022 0842  Gross per 24 hour   Intake --   Output 200 ml   Net -200 ml      Physical Exam  Constitutional:       General: She is not in acute distress.     Appearance: Normal appearance. She is obese. She is not ill-appearing, toxic-appearing or diaphoretic.   HENT:      Head: Normocephalic and atraumatic.      Nose: Nose normal.      Mouth/Throat:      Mouth: Mucous membranes are moist.   Eyes:      General: No scleral icterus.     Extraocular Movements: Extraocular movements intact.      Pupils: Pupils are equal, round, and reactive to light.   Cardiovascular:      Rate and Rhythm: Normal rate and regular rhythm.      Pulses: Normal pulses.      Heart sounds: Normal heart sounds.   Pulmonary:      Effort: Pulmonary effort is normal.      Breath sounds: Normal breath sounds. No wheezing or rhonchi.   Chest:      Chest wall: No tenderness.   Abdominal:      General: Abdomen is flat. Bowel sounds are normal. There is no distension.      Palpations: Abdomen is soft.      Tenderness: There is no abdominal tenderness. There is no right CVA tenderness, left CVA tenderness, guarding or rebound.   Musculoskeletal:         General: No swelling, tenderness, deformity or signs of injury. Normal range of motion.      Cervical back: Normal range of motion and neck supple. No rigidity or tenderness.    Skin:     General: Skin is warm and dry.      Coloration: Skin is not jaundiced or pale.      Findings: No erythema or rash.   Neurological:      General: No focal deficit present.      Mental Status: She is alert and oriented to person, place, and time. Mental status is at baseline.      Cranial Nerves: No cranial nerve deficit.      Motor: No weakness.      Gait: Gait normal.   Psychiatric:         Mood and Affect: Mood normal.         Behavior: Behavior normal.         Thought Content: Thought content normal.         Judgment: Judgment normal.       Significant Labs: All pertinent labs within the past 24 hours have been reviewed.  CBC:   Recent Labs   Lab 12/07/22  0257   WBC 6.49   HGB 11.8*   HCT 36.1*        CMP:   Recent Labs   Lab 12/06/22  2358 12/07/22  0257    138   K 3.6 3.5    105   CO2 27 25   GLU 95 99   BUN 16 19   CREATININE 1.0 1.2   CALCIUM 10.0 9.6   ANIONGAP 8 8       Significant Imaging: I have reviewed all pertinent imaging results/findings within the past 24 hours.      Assessment/Plan:      * Atrial fibrillation  Dofetilide 500 mcg BID  apixaban 5 mg daily BID  S/p MIRIAM/ DCCV  Dr. Andrade, EP Cardiology following  12/8- chatted w Cardiology team. If all ok will dc after one more dose tomorrow    Bradycardia on ECG  Baseline  No acute interventions required now  12/8- asymptomatic    Essential hypertension  Amlodipine 10 mg daily and triamterene-HCTZ  BP soft - hold antihypertensives for now  HCTZ is contraindicated while being use dofetilide    Hyperthyroidism  Methimazole 5 mg daily    Chronic sinusitis  Montelukast 10 mg daily    VTE Risk Mitigation (From admission, onward)         Ordered     apixaban tablet 5 mg  2 times daily         12/06/22 1452                Discharge Planning   ISAURA: 12/9/2022     Code Status: Full Code   Is the patient medically ready for discharge?: No    Reason for patient still in hospital (select all that apply): Patient trending condition              Shelly Nathan MD  Department of [facility] Medicine   [facility] - Cardiology Stepdown

## 2022-12-08 NOTE — ASSESSMENT & PLAN NOTE
Ms. Junior is a 62 year old W w/ PMHx with hypertension, morbid obesity, asymptomatic sinus bradycardia, and paroxysmal tachycardia now recently diagnosed with atrial fibrillation with RVR. She was admitted to [facility] today after scheduled MIRIAM/DCCV due to AF with plan for Tikosyn post procedure with Dr. Andrade.     Recommendations:  -Continue dofetilide 250mg BID  -Repeat ECG 2 hours post each dose of dofetilide  - Continue eliquis 5mg BID  - Please contact us if any questions/concerns arrise.  - We will monitor overnight after 5 dose and likely she can be dc tomorrow if no issues.     Discussed with Dr. Andrade and EP fellow.      Discussed w/ Dr. Andrade and EP fellow

## 2022-12-08 NOTE — CARE UPDATE
RAPID RESPONSE NURSE ROUND       Rounding completed with charge RNIrma for bradycardia reports asymptomatic. Provider aware. Patient recently intiiated on tikosyn. No additional concerns verbalized at this time. Instructed to call [phone] for further concerns or assistance.

## 2022-12-08 NOTE — SUBJECTIVE & OBJECTIVE
Interval History: see above    Review of Systems   Constitutional:  Negative for activity change, chills, fatigue and fever.   HENT:  Negative for congestion, nosebleeds and trouble swallowing.    Respiratory:  Negative for apnea, cough, choking, chest tightness and shortness of breath.    Cardiovascular:  Negative for chest pain and leg swelling.   Gastrointestinal:  Negative for abdominal distention, abdominal pain, constipation, diarrhea, nausea and vomiting.   Genitourinary:  Negative for decreased urine volume, difficulty urinating, dysuria and frequency.   Musculoskeletal:  Negative for arthralgias, back pain, joint swelling, neck pain and neck stiffness.   Skin:  Negative for rash and wound.   Neurological:  Negative for dizziness, seizures, syncope, weakness, light-headedness, numbness and headaches.   Psychiatric/Behavioral:  Negative for agitation, behavioral problems, confusion, hallucinations, self-injury and sleep disturbance. The patient is not nervous/anxious.    Objective:     Vital Signs (Most Recent):  Temp: 98.2 °F (36.8 °C) (12/08/22 0434)  Pulse: (!) 42 (12/08/22 0705)  Resp: 18 (12/08/22 0434)  BP: (!) 95/51 (12/08/22 0434)  SpO2: 100 % (12/08/22 0434)   Vital Signs (24h Range):  Temp:  [97.4 °F (36.3 °C)-98.3 °F (36.8 °C)] 98.2 °F (36.8 °C)  Pulse:  [35-48] 42  Resp:  [13-18] 18  SpO2:  [94 %-100 %] 100 %  BP: ()/(51-71) 95/51     Weight: 134.9 kg (297 lb 6.4 oz)  Body mass index is 51.05 kg/m².    Intake/Output Summary (Last 24 hours) at 12/8/2022 0707  Last data filed at 12/7/2022 0842  Gross per 24 hour   Intake --   Output 200 ml   Net -200 ml      Physical Exam  Constitutional:       General: She is not in acute distress.     Appearance: Normal appearance. She is obese. She is not ill-appearing, toxic-appearing or diaphoretic.   HENT:      Head: Normocephalic and atraumatic.      Nose: Nose normal.      Mouth/Throat:      Mouth: Mucous membranes are moist.   Eyes:      General: No  scleral icterus.     Extraocular Movements: Extraocular movements intact.      Pupils: Pupils are equal, round, and reactive to light.   Cardiovascular:      Rate and Rhythm: Normal rate and regular rhythm.      Pulses: Normal pulses.      Heart sounds: Normal heart sounds.   Pulmonary:      Effort: Pulmonary effort is normal.      Breath sounds: Normal breath sounds. No wheezing or rhonchi.   Chest:      Chest wall: No tenderness.   Abdominal:      General: Abdomen is flat. Bowel sounds are normal. There is no distension.      Palpations: Abdomen is soft.      Tenderness: There is no abdominal tenderness. There is no right CVA tenderness, left CVA tenderness, guarding or rebound.   Musculoskeletal:         General: No swelling, tenderness, deformity or signs of injury. Normal range of motion.      Cervical back: Normal range of motion and neck supple. No rigidity or tenderness.   Skin:     General: Skin is warm and dry.      Coloration: Skin is not jaundiced or pale.      Findings: No erythema or rash.   Neurological:      General: No focal deficit present.      Mental Status: She is alert and oriented to person, place, and time. Mental status is at baseline.      Cranial Nerves: No cranial nerve deficit.      Motor: No weakness.      Gait: Gait normal.   Psychiatric:         Mood and Affect: Mood normal.         Behavior: Behavior normal.         Thought Content: Thought content normal.         Judgment: Judgment normal.       Significant Labs: All pertinent labs within the past 24 hours have been reviewed.  CBC:   Recent Labs   Lab 12/07/22  0257   WBC 6.49   HGB 11.8*   HCT 36.1*        CMP:   Recent Labs   Lab 12/06/22  2358 12/07/22  0257    138   K 3.6 3.5    105   CO2 27 25   GLU 95 99   BUN 16 19   CREATININE 1.0 1.2   CALCIUM 10.0 9.6   ANIONGAP 8 8       Significant Imaging: I have reviewed all pertinent imaging results/findings within the past 24 hours.

## 2022-12-08 NOTE — CODE/ RAPID DOCUMENTATION
RAPID RESPONSE NURSE ROUND       Rounding completed with charge RN, Sudheer for bradycardia reports asymptomatic. No additional concerns verbalized at this time. Instructed to call [phone] for further concerns or assistance.

## 2022-12-08 NOTE — PROGRESS NOTES
Pharmacy Tikosyn Education Note     Patient was instructed on how to take Tikosyn as well as side effects, compliance, and drug interactions  REMS handouts were given  Patient will fill first dose from [facility] for $177  and was educated on future fills via [facility] or notifying retail pharmacies at least 2 weeks prior to needing a refill to ensure they can stock the medication  Patient was also referred to patient assistance for help with prescription coverage  Home medications were reviewed  The patient was educated on the following DDI and medications discontinued from her home medication list:  triamterene-HCTZ  All questions and concerns were addressed      Laurel Howard, PharmD, BCPS  Ext. [phone]

## 2022-12-08 NOTE — PLAN OF CARE
Plan of care discussed with patient. Patient continues to be bradycardic but is without symptoms. Patient remains free of falls and trauma. Patient ambulating independently, fall precautions in place. Patient has no complaints of pain. Discussed medications and care. Patient has no questions at this time. Will continue to monitor.     Problem: Adult Inpatient Plan of Care  Goal: Plan of Care Review  Outcome: Ongoing, Progressing  Goal: Patient-Specific Goal (Individualized)  Outcome: Ongoing, Progressing  Goal: Absence of Hospital-Acquired Illness or Injury  Outcome: Ongoing, Progressing  Goal: Optimal Comfort and Wellbeing  Outcome: Ongoing, Progressing  Goal: Readiness for Transition of Care  Outcome: Ongoing, Progressing     Problem: Bariatric Environmental Safety  Goal: Safety Maintained with Care  Outcome: Ongoing, Progressing     Problem: Fall Injury Risk  Goal: Absence of Fall and Fall-Related Injury  Outcome: Ongoing, Progressing

## 2022-12-08 NOTE — HOSPITAL COURSE
Holding antihypertensives as BPs soft.  12/8- BP  95/51  Pulse  42 on Tikosyn therapy for her antiarrhythmia but she is still having a low HR.along with A-fib. Cardiology following. Plan for dc tomorrow if all ok.   12/9- /58  Pulse 36 . Cardiology aware of bradycardia. Will discuss again. Pt has asymptomatic bradycardia at home PTA. Planning on dc to home if all okay.

## 2022-12-08 NOTE — SUBJECTIVE & OBJECTIVE
Interval History: Patient is doing well and tolerating tikosyn. Telemetry and ECG have been reviewed: LINCOLN Roth,       Objective:     Vital Signs (Most Recent):  Temp: 98.2 °F (36.8 °C) (12/08/22 1144)  Pulse: (!) 51 (12/08/22 1510)  Resp: 15 (12/08/22 1144)  BP: 133/69 (12/08/22 1144)  SpO2: (!) 91 % (12/08/22 1144)   Vital Signs (24h Range):  Temp:  [97.8 °F (36.6 °C)-98.3 °F (36.8 °C)] 98.2 °F (36.8 °C)  Pulse:  [35-51] 51  Resp:  [15-18] 15  SpO2:  [91 %-100 %] 91 %  BP: ()/(51-69) 133/69     Weight: 134.9 kg (297 lb 6.4 oz)  Body mass index is 51.05 kg/m².     SpO2: (!) 91 %  O2 Device (Oxygen Therapy): room air    Physical Exam    Vitals reviewed.   Constitutional:       General: She is not in acute distress.     Appearance: She is well-developed. She is not diaphoretic.   HENT:      Head: Normocephalic and atraumatic.   Eyes:      General:         Right eye: No discharge.         Left eye: No discharge.      Conjunctiva/sclera: Conjunctivae normal.   Cardiovascular:      Rate and Rhythm: sinus bradycardia present.      Heart sounds: No murmur heard.    No friction rub. No gallop. .  Pulmonary:      Effort: Pulmonary effort is normal. No respiratory distress.      Breath sounds: Normal breath sounds. No wheezing or rales.   Abdominal:      General: Bowel sounds are normal. There is no distension.      Palpations: Abdomen is soft.      Tenderness: There is no abdominal tenderness.   Musculoskeletal:      Cervical back: Neck supple.   Skin:     General: Skin is warm and dry.   Neurological:      Mental Status: She is alert and oriented to person, place, and time.   Psychiatric:         Behavior: Behavior normal.         Thought Content: Thought content normal.         Judgment: Judgment normal.        Significant Labs:   Recent Lab Results       None

## 2022-12-08 NOTE — PLAN OF CARE
This patient is alert and oriented and can make her needs known to staff. She has improved since her admission and is independent with ADLs, however she is concerned as to why she is on a very strict intake and output. She voiced a concern that her physician did not discuss this with her and would like to have a discussion about it with her physician. She is still on Tikosyn therapy for her antiarrhythmia but she is still having a low HR.along with A-fib. No other plans have been ordered for her at this time. She continues to EKG 2-3 hours after administration of Tikosyn.

## 2022-12-09 VITALS
TEMPERATURE: 98 F | SYSTOLIC BLOOD PRESSURE: 129 MMHG | BODY MASS INDEX: 50.02 KG/M2 | DIASTOLIC BLOOD PRESSURE: 63 MMHG | HEIGHT: 64 IN | RESPIRATION RATE: 16 BRPM | WEIGHT: 293 LBS | HEART RATE: 42 BPM | OXYGEN SATURATION: 99 %

## 2022-12-09 LAB
ALBUMIN SERPL BCP-MCNC: 3.2 G/DL (ref 3.5–5.2)
ALP SERPL-CCNC: 59 U/L (ref 55–135)
ALT SERPL W/O P-5'-P-CCNC: 13 U/L (ref 10–44)
ANION GAP SERPL CALC-SCNC: 8 MMOL/L (ref 8–16)
AST SERPL-CCNC: 16 U/L (ref 10–40)
BILIRUB SERPL-MCNC: 0.2 MG/DL (ref 0.1–1)
BUN SERPL-MCNC: 17 MG/DL (ref 8–23)
CALCIUM SERPL-MCNC: 9.1 MG/DL (ref 8.7–10.5)
CHLORIDE SERPL-SCNC: 106 MMOL/L (ref 95–110)
CO2 SERPL-SCNC: 25 MMOL/L (ref 23–29)
CREAT SERPL-MCNC: 0.9 MG/DL (ref 0.5–1.4)
EST. GFR  (NO RACE VARIABLE): >60 ML/MIN/1.73 M^2
GLUCOSE SERPL-MCNC: 92 MG/DL (ref 70–110)
POTASSIUM SERPL-SCNC: 3.8 MMOL/L (ref 3.5–5.1)
PROT SERPL-MCNC: 6.7 G/DL (ref 6–8.4)
SODIUM SERPL-SCNC: 139 MMOL/L (ref 136–145)

## 2022-12-09 PROCEDURE — 25000003 PHARM REV CODE 250: Performed by: INTERNAL MEDICINE

## 2022-12-09 PROCEDURE — 25000003 PHARM REV CODE 250: Performed by: STUDENT IN AN ORGANIZED HEALTH CARE EDUCATION/TRAINING PROGRAM

## 2022-12-09 PROCEDURE — 80053 COMPREHEN METABOLIC PANEL: CPT | Performed by: HOSPITALIST

## 2022-12-09 PROCEDURE — 93005 ELECTROCARDIOGRAM TRACING: CPT

## 2022-12-09 PROCEDURE — 36415 COLL VENOUS BLD VENIPUNCTURE: CPT | Performed by: HOSPITALIST

## 2022-12-09 RX ORDER — MONTELUKAST SODIUM 10 MG/1
10 TABLET ORAL DAILY
Qty: 30 TABLET | Refills: 0 | Status: SHIPPED | OUTPATIENT
Start: 2022-12-09 | End: 2023-01-08

## 2022-12-09 RX ORDER — DOFETILIDE 0.25 MG/1
250 CAPSULE ORAL EVERY 12 HOURS
Qty: 60 CAPSULE | Refills: 11 | Status: SHIPPED | OUTPATIENT
Start: 2022-12-09 | End: 2022-12-09 | Stop reason: HOSPADM

## 2022-12-09 RX ADMIN — THERA TABS 1 TABLET: TAB at 08:12

## 2022-12-09 RX ADMIN — APIXABAN 5 MG: 5 TABLET, FILM COATED ORAL at 08:12

## 2022-12-09 RX ADMIN — MONTELUKAST 10 MG: 10 TABLET, FILM COATED ORAL at 08:12

## 2022-12-09 RX ADMIN — DOFETILIDE 250 MCG: 0.25 CAPSULE ORAL at 11:12

## 2022-12-09 RX ADMIN — METHIMAZOLE 5 MG: 5 TABLET ORAL at 08:12

## 2022-12-09 RX ADMIN — POTASSIUM CHLORIDE 30 MEQ: 10 CAPSULE, COATED, EXTENDED RELEASE ORAL at 08:12

## 2022-12-09 NOTE — PLAN OF CARE
Problem: Adult Inpatient Plan of Care  Goal: Plan of Care Review  Outcome: Ongoing, Progressing  Goal: Patient-Specific Goal (Individualized)  Outcome: Ongoing, Progressing     Pt is AAOx4. VS monitored. Pt continues to be bradycardic with no symptoms. Medication given as per ordered. Pt is active and moves independently and fall precautions in place. Pt is on Saline lock. Plan of care discussed with patient.  Patient has no complaints of pain. Discussed medications and care. Patient has no questions at this time. Will continue to monitor.

## 2022-12-09 NOTE — DISCHARGE SUMMARY
Sree Barney - Cardiology [facility] Medicine  Discharge Summary      Patient Name: Roxane Junior  MRN: [id number]  EMELYN: [id number]  Patient Class: IP- Inpatient  Admission Date: 12/6/2022  Hospital Length of Stay: 1 days  Discharge Date and Time: No discharge date for patient encounter.  Attending Physician: Shelly Nathan MD   Discharging Provider: Shelly Nathan MD  Primary Care Provider: Dinesh Pacheco MD  Hospital Medicine Team: [facility] Shelly Nathan MD  Primary Care Team: [facility]    HPI:   Ms. Junior is a 62 year old W w/ PMHx with hypertension, morbid obesity, asymptomatic sinus bradycardia, and paroxysmal tachycardia now recently diagnosed with atrial fibrillation with RVR. She was [place] to [facility] [place] after scheduled MIRIAM/DCCV due to AF with plan for Tikosyn post procedure with Dr. Andrade. Denied any chest pain, SOB, RIZVI, dizziness, light headedness, weakness, LE swelling, syncope, or near syncopal episodes.       Procedure(s) (LRB):  CARDIOVERSION (N/A)  Transesophageal echo (MIRIAM) intra-procedure log documentation (N/A)      Hospital Course:   Holding antihypertensives as BPs soft.  12/8- BP  95/51  Pulse  42 on Tikosyn therapy for her antiarrhythmia but she is still having a low HR.along with A-fib. Cardiology following. Plan for dc tomorrow if all ok.   12/9- /58  Pulse 36 . Cardiology aware of bradycardia. Will discuss again. Pt has asymptomatic bradycardia at home PTA. Planning on dc to home if all okay.        Goals of Care Treatment Preferences:  Code Status: Full Code      Consults:   Consults (From admission, onward)          Status Ordering Provider     Case Management/  Once        Provider:  (Not yet assigned)    Acknowledged GERMAN RUBI.            * New onset atrial fibrillation  Dofetilide 500 mcg BID  apixaban 5 mg daily BID  S/p MIRIAM/ DCCV  Dr. Andrade, EP Cardiology following  12/8- chatted w Cardiology team. If all ok will dc after one  more dose tomorrow  12/9- dc to home    Bradycardia on ECG  Baseline  No acute interventions required now  12/8- asymptomatic, present before admission    Essential hypertension  Amlodipine 10 mg daily and triamterene-HCTZ  BP soft - hold antihypertensives for now  HCTZ is contraindicated while being use dofetilide    Hyperthyroidism  Methimazole 5 mg daily    Chronic sinusitis  Montelukast 10 mg daily    Visit for monitoring Tikosyn therapy  EP cardiology folllowing      Abnormal heart rate  See above      Heart failure with preserved ejection fraction  stable      Final Active Diagnoses:    Diagnosis Date Noted POA    PRINCIPAL PROBLEM:  New onset atrial fibrillation [I48.91] 11/22/2022 Yes    Bradycardia on ECG [R00.1] 07/06/2021 Yes    Essential hypertension [I10] 07/06/2021 Yes    Hyperthyroidism [E05.90] 07/06/2021 Yes    Chronic sinusitis [J32.9] 12/06/2022 Yes    Heart failure with preserved ejection fraction [I50.30] 12/08/2022 Yes    Abnormal heart rate [R00.9] 12/08/2022 Yes    Visit for monitoring Tikosyn therapy [Z51.81, Z79.899] 12/08/2022 Not Applicable      Problems Resolved During this Admission:       Discharged Condition: good    Disposition:     Follow Up:    Patient Instructions:      Ambulatory referral/consult to Pharmacy Assistance   Standing Status: Future   Referral Priority: Routine Referral Type: Consultation   Referral Reason: Specialty Services Required   Number of Visits Requested: 1     Ambulatory referral/consult to Cardiac Electrophysiology   Standing Status: Future   Referral Priority: Routine Referral Type: Consultation   Referral Reason: Specialty Services Required   Requested Specialty: Cardiology   Number of Visits Requested: 1     Ambulatory referral/consult to Internal Medicine   Standing Status: Future   Referral Priority: Routine Referral Type: Consultation   Referral Reason: Specialty Services Required   Requested Specialty: Internal Medicine   Number of Visits Requested:  1       Significant Diagnostic Studies: Labs:   [facility]   Recent Labs   Lab 12/09/22  0002      K 3.8      CO2 25   GLU 92   BUN 17   CREATININE 0.9   CALCIUM 9.1   PROT 6.7   ALBUMIN 3.2*   BILITOT 0.2   ALKPHOS 59   AST 16   ALT 13   ANIONGAP 8    and CBC No results for input(s): WBC, HGB, HCT, PLT in the last 48 hours.    Pending Diagnostic Studies:       Procedure Component Value Units Date/Time    EKG 12-lead [[id number]]     Order Status: Sent Lab Status: No result     EKG 12-lead [[id number]]     Order Status: Sent Lab Status: No result     EKG 12-lead [[id number]]     Order Status: Sent Lab Status: No result     EKG 12-lead [[id number]]     Order Status: Sent Lab Status: No result     EKG 12-lead [[id number]]     Order Status: Sent Lab Status: No result     EKG 2 - 3 hours after each dose [[id number]]     Order Status: Sent Lab Status: No result            Medications:  Reconciled Home Medications:      Medication List        START taking these medications      dofetilide 250 MCG Cap  Commonly known as: TIKOSYN  Take 1 capsule (250 mcg total) by mouth every 12 (twelve) hours.     multivitamin Tab  Take 1 tablet by mouth once daily.  Replaces: multivitamin capsule            CHANGE how you take these medications      montelukast 10 mg tablet  Commonly known as: SINGULAIR  Take 1 tablet (10 mg total) by mouth once daily.  What changed: additional instructions            CONTINUE taking these medications      apixaban 5 mg Tab  Commonly known as: ELIQUIS  Take 1 tablet (5 mg total) by mouth 2 (two) times daily.     methIMAzole 5 MG Tab  Commonly known as: TAPAZOLE  Take 5 mg by mouth once daily.            STOP taking these medications      amLODIPine 10 MG tablet  Commonly known as: NORVASC     multivitamin capsule  Replaced by: multivitamin Tab     triamterene-hydrochlorothiazide 37.5-25 mg 37.5-25 mg per tablet  Commonly known as: MAXZIDE-25              Indwelling Lines/Drains at time of discharge:    Lines/Drains/Airways       None                   Time spent on the discharge of patient: 35 minutes         Shelly Nathan MD  Department of Hospital Medicine  [facility] - Cardiology Stepdown

## 2022-12-09 NOTE — PLAN OF CARE
Problem: Adult Inpatient Plan of Care  Goal: Plan of Care Review  Outcome: Ongoing, Progressing  Goal: Patient-Specific Goal (Individualized)  Outcome: Ongoing, Progressing     Problem: Bariatric Environmental Safety  Goal: Safety Maintained with Care  Outcome: Ongoing, Progressing     Problem: Adult Inpatient Plan of Care  Goal: Absence of Hospital-Acquired Illness or Injury  Outcome: Met  Goal: Optimal Comfort and Wellbeing  Outcome: Met  Goal: Readiness for Transition of Care  Outcome: Met     Problem: Fall Injury Risk  Goal: Absence of Fall and Fall-Related Injury  Outcome: Met

## 2022-12-09 NOTE — ASSESSMENT & PLAN NOTE
Dofetilide 500 mcg BID  apixaban 5 mg daily BID  S/p MIRIAM/ DCCV  Dr. Andrade, EP Cardiology following  12/8- chatted w Cardiology team. If all ok will dc after one more dose tomorrow  12/9- dc to home

## 2022-12-09 NOTE — PLAN OF CARE
12/09/22 1555   Final Note   Assessment Type Final Discharge Note   Anticipated Discharge Disposition Home   Patient friend, Rishabh, is in the room. Patient friend will take patient home when discharged.

## 2022-12-09 NOTE — NURSING
AM dose of Tikosyn yet to be administered due to inability to reach MD ([facility]) despite multiple attempts to confirm okay to give dose. Charge nurse notified. Will continue to monitor.

## 2022-12-09 NOTE — PLAN OF CARE
[facility]ree Barney - Cardiology Stepdown  Initial Discharge Assessment       Primary Care Provider: Dinesh Pacheco MD    Admission Diagnosis: New onset atrial fibrillation [I48.91]  Atrial fibrillation, unspecified type [I48.91]    Admission Date: 12/6/2022  Expected Discharge Date: 12/9/2022    Discharge Barriers Identified: None    Payor: BLUE CROSS BLUE SHIELD / Plan: [place] HMO / Product Type: HMO /     Extended Emergency Contact Information  Primary Emergency Contact: Lakeisha Weir  Mobile Phone: [phone]  Relation: Sister   needed? No  Secondary Emergency Contact: MARIONERICH  Mobile Phone: [phone]  Relation: Friend  Preferred language: English   needed? No    Discharge Plan A: Home with family  Discharge Plan B: Home Health      [facility] - [place] - [address]  [address] [place] [place] [address]  Phone: [phone] Fax: [phone]      Initial Assessment (most recent)       Adult Discharge Assessment - 12/09/22 1141          Discharge Assessment    Assessment Type Discharge Planning Assessment     Confirmed/corrected address, phone number and insurance Yes     Confirmed Demographics Correct on Facesheet     Source of Information patient     When was your last doctors appointment? 12/01/22     People in Home alone     Do you expect to return to your current living situation? Yes     Do you have help at home or someone to help you manage your care at home? Yes     Who are your caregiver(s) and their phone number(s)? Erich Marion, Friend, [phone]     Prior to hospitilization cognitive status: Alert/Oriented     Current cognitive status: Alert/Oriented     Home Layout Able to live on 1st floor     Equipment Currently Used at Home none     Readmission within 30 days? No     Patient currently being followed by outpatient case management? No     Do you currently have service(s) that help you manage your care at home? No     Do you  take prescription medications? Yes     Do you have prescription coverage? Yes     Do you have any problems affording any of your prescribed medications? Yes     If yes, what medications? Pt. states Tikoysin that is newly prescibed may be too expensive for her.     Is the patient taking medications as prescribed? yes     Who is going to help you get home at discharge? Rishabh Marion, Friend, [phone]     How do you get to doctors appointments? car, drives self     Are you on dialysis? No     Do you take coumadin? No     Discharge Plan A Home with family     Discharge Plan B Home Health     DME Needed Upon Discharge  none     Discharge Plan discussed with: Patient     Discharge Barriers Identified None                 The CM met with the patient at bedside to complete the DPA.  The CM placed name and contact information on the blackboard in the patient's room.  Use preferred pharmacy / bedside delivery for any necessary  medications at the time of discharge.The patient is independent with all ADLs.  The patient doesn't use any equipment. The patient is not on Dialysis or Coumadin. The patient's friend, Rishabh Marion, will provide assistance to the patient upon discharge. The patient's friend, Rishabh Marion, will provide transportation upon discharge . The CM will continue to follow for course of hospitalization.

## 2022-12-10 NOTE — PLAN OF CARE
Patient is ready for discharge. Patient stable alert and oriented. Tele, and IVs removed. No complaints of pain. Discussed discharge plan. Reviewed medications and side effects, appointments, and answered questions with patient and family member. Home medication delivered to bedside.

## 2022-12-10 NOTE — PLAN OF CARE
12/09/2022      Roxane Junior  [address] [address]          [facility] Medicine Dept.  [facility] [address] [address] [phone] after hours  [phone] fax       Excuse Mrs Junior from work  12/6/2022 through 12/13/2022.         Shelly Nathan MD  Senior Hospitalist  Department of Hospital Medicine  [facility]  [address], [phone]    12/09/2022

## 2022-12-10 NOTE — NURSING
MD Nathan is yet to verify if patient is okay to be discharged. MD has been paged multiple times. Charge nurse notified. Will continue to monitor.

## 2022-12-10 NOTE — NURSING
Spoke with MD Jac echevarria of patient's room. MD stated she will call or secure chat me to verify that patient was okay to be discharged. Will continue to monitor.

## 2022-12-12 ENCOUNTER — TELEPHONE (OUTPATIENT)
Dept: ELECTROPHYSIOLOGY | Facility: CLINIC | Age: 62
End: 2022-12-12
Payer: COMMERCIAL

## 2022-12-12 NOTE — TELEPHONE ENCOUNTER
Spoke with patient who requests a work excuse/return to work note. Upon review , patient already had a letter generated upon discharge, but was not given to the patient. Patient advised that there is a letter already completed , I just need to know where she would like me to send it. Patient states that she prefers to pick it up at the office , and will also need another Eliquis co pay card as she misplaced the one previously provided. Advised that both the letter and co pay card will be left at the  tomorrow for . Understanding verbalized.

## 2022-12-12 NOTE — TELEPHONE ENCOUNTER
----- Message from Anna Marie Gomez MA sent at 12/12/2022 10:37 AM CST -----  Regarding: FW: RTW    ----- Message -----  From: Jacob Jaffe  Sent: 12/12/2022  10:36 AM CST  To: [facility] Staff  Subject: RTW                                              Pt called for a return to work slip.  Pt can be reached @ [phone]

## 2022-12-14 ENCOUNTER — PATIENT OUTREACH (OUTPATIENT)
Dept: ADMINISTRATIVE | Facility: CLINIC | Age: 62
End: 2022-12-14
Payer: COMMERCIAL

## 2022-12-14 NOTE — PHYSICIAN QUERY
PT Name: Roxane Junior  MR #: [id number]    DOCUMENTATION CLARIFICATION     CDS/: Alondra Almeida RN CDIS           Contact information: [email or web address]       This form is a permanent document in the medical record.     Query Date: December 14, 2022    By submitting this query, we are merely seeking further clarification of documentation. Please utilize your independent clinical judgment when addressing the question(s) below.    The Medical Record contains the following:   Indicators Supporting Clinical Findings Location in Medical Record   x Atrial Fibrillation New onset atrial fibrillation  Ms. Junior is a 62 year old W w/ PMHx with hypertension, morbid obesity, asymptomatic sinus bradycardia, and paroxysmal tachycardia now recently diagnosed with atrial fibrillation with RVR. She was admitted to [facility] today after scheduled MIRIAM/DCCV due to AF with plan for Tikosyn post procedure with Dr. Andrade.      Recommendations:  -Continue dofetilide 250mg BID  -Repeat ECG 2 hours post each dose of dofetilide  - Continue eliquis 5mg BID  - Please contact us if any questions/concerns arrise.  - We will monitor overnight after 5 dose and likely she can be dc tomorrow if no issues.  Arrhythmia note 12/8    x EKG Results Atrial fibrillation with rapid ventricular response   Abnormal QRS-T angle, consider primary T wave abnormality   Abnormal ECG   When compared with ECG of 22-NOV-2022 14:26,   No significant change was found   Confirmed by Horacio MANZANO MD ([id number]) on 12/6/2022 11:09:52 AM  EKG 12/6   x Medication dofetilide capsule 250 mcg  Dose: 250 mcg  Freq: Every 12 hours Route: Oral  Start: 12/07/22 0900 End: 12/09/22 2318 MAR     Treatment      Other       The clinical guidelines noted are only a system guideline. It does not replace the provider's clinical judgment.    Provider, please specify the type of Atrial Fibrillation (AF):    [  ] Paroxysmal - defined as AF that terminates spontaneously or with  intervention within < 7 days of onset, episodes may recur with variable frequency   [x  ] Other Persistent - defined as AF that sustained for ?7 days; Episodes often require pharmacologic or electrical cardioversion to restore sinus rhythm   [  ] Chronic, not otherwise specified      [  ] Unspecified Atrial Fibrillation   [  ] Other cardiac diagnosis (please specify): ____________             Please document in your progress notes daily for the duration of treatment until resolved, and include in your discharge summary.    Reference:  INDER Jacinto MD. (2020, September 14). Overview of atrial fibrillation (CHARMAINE Sargent MD & ZOË Mathews MD, Eds.). Retrieved October 22, 2020, from https://[email or web address]/contents/mildkdkr-wj-teatcs-fibrillation?search=atrial%20fibrillation&source=search_result&selectedTitle=1~150&usage_type=default&display_rank=1    Form No. [id number]

## 2022-12-14 NOTE — PHYSICIAN QUERY
PT Name: Roxane Junior  MR #: [id number]     DOCUMENTATION CLARIFICATION     CDS/: Alondra Almeida RN CDIS             Contact information: [email or web address]    This form is a permanent document in the medical record.     Query Date: December 14, 2022    By submitting this query, we are merely seeking further clarification of documentation.  Please utilize your independent clinical judgment when addressing the question(s) below.    The Medical Record contains the following   Indicators Supporting Clinical Findings Location in Medical Record   x Heart Failure documented Heart failure with preserved ejection fraction  stable Discharge summary     BNP     x EF/Echo Mild left atrial enlargement.  Normal central venous pressure (3 mmHg).  The estimated PA systolic pressure is 18 mmHg.  The estimated ejection fraction is 69%.  Indeterminate left ventricular diastolic function.  The left ventricle is normal in size with normal systolic function.  Normal right ventricular size with normal right ventricular systolic function.    The left ventricle is normal in size with concentric hypertrophy and normal systolic function.  Moderate left atrial enlargement.  Normal central venous pressure (3 mmHg).  The estimated PA systolic pressure is 30 mmHg.  The estimated ejection fraction is 62%.  Indeterminate left ventricular diastolic function.  Normal right ventricular size with normal right ventricular systolic function. Echo 7/21                     Echo 9/22     Radiology findings     x Subjective/Objective Respiratory Conditions Pulmonary:      Effort: Pulmonary effort is normal.      Breath sounds: Normal breath sounds.  Cards consult     Recent/Current MI      Heart Transplant, LVAD     x Edema, JVD Skin:     General: Skin is warm.      Capillary Refill: Capillary refill takes less than 2 seconds. Cards consult     Ascites      Diuretics/Meds      Other Treatment      Other       Heart failure is a clinical diagnosis  which includes symptomatic fluid retention, elevated intracardiac pressures, and/or the inability of the heart to deliver adequate blood flow.    Heart Failure with reduced Ejection Fraction (HFrEF) or Systolic Heart Failure (loses ability to contract normally, EF is <40%)    Heart Failure with preserved Ejection Fraction (HFpEF) or Diastolic Heart Failure (stiff ventricles, does not relax properly, EF is >50%)     Heart Failure with Combined Systolic and Diastolic Failure (stiff ventricles, does not relax properly and EF is <50%)    Mid-range or mildly reduced ejection fraction (HFmrEF) is classified as systolic heart failure.  Congestive heart failure with a recovered EF is classified as Diastolic Heart Failure.  Common clues to acute exacerbation:  Rapidly progressive symptoms (w/in 2 weeks of presentation), using IV diuretics, using supplemental O2, pulmonary edema on Xray, new or worsening pleural effusion, +JVD or other signs of volume overload, MI w/in 4 weeks, and/or BNP >500  The clinical guidelines noted are only system guidelines, and do not replace the providers clinical judgment.    Please clarify the ACUITY of the documented heart failure:    [  x ]  Chronic Diastolic Heart Failure (HFpEF) - preexisting and stable   [   ]  Other (please specify): ___________________________________           Please document in your progress notes daily for the duration of treatment until resolved and include in your discharge summary.    References:  American Heart Association editorial staff. (2017, May). Ejection Fraction Heart Failure Measurement. American Heart Association. https://www.heart.org/en/health-topics/heart-failure/diagnosing-heart-failure/ejection-fraction-heart-failure-measurement#:~:text=Ejection%20fraction%20(EF)%20is%20a,pushed%20out%20with%20each%20heartbeat  RODRIGO Chauhan (2020, December 15). Heart failure with preserved ejection fraction: Clinical manifestations and diagnosis. UpToDate.  https://www.[employer]/contents/heart-failure-with-preserved-ejection-fraction-clinical-manifestations-and-diagnosis.  ICD-10-CM/PCS Coding Clinic Third Quarter ICD-10, Effective with discharges: September 8, 2020 [facility] § Heart failure with mid-range or mildly reduced ejection fraction (2020).  ICD-10-CM/PCS Coding Clinic Third Quarter ICD-10, Effective with discharges: September 8, 2020 [facility] § Heart failure with recovered ejection fraction (2020).  Form No. [id number]

## 2022-12-14 NOTE — PROGRESS NOTES
C3 nurse spoke with Roxane Carcamo  for a TCC post hospital discharge follow up call. The patient has a scheduled HOSFU appointment with KAYLYN TORREZ on 12/16/2022 @ 1300.

## 2022-12-15 ENCOUNTER — TELEPHONE (OUTPATIENT)
Dept: ELECTROPHYSIOLOGY | Facility: CLINIC | Age: 62
End: 2022-12-15
Payer: COMMERCIAL

## 2022-12-20 ENCOUNTER — TELEPHONE (OUTPATIENT)
Dept: ELECTROPHYSIOLOGY | Facility: CLINIC | Age: 62
End: 2022-12-20
Payer: COMMERCIAL

## 2022-12-20 ENCOUNTER — PATIENT MESSAGE (OUTPATIENT)
Dept: ELECTROPHYSIOLOGY | Facility: CLINIC | Age: 62
End: 2022-12-20
Payer: COMMERCIAL

## 2022-12-20 ENCOUNTER — OFFICE VISIT (OUTPATIENT)
Dept: CARDIOLOGY | Facility: CLINIC | Age: 62
End: 2022-12-20
Payer: COMMERCIAL

## 2022-12-20 VITALS
DIASTOLIC BLOOD PRESSURE: 64 MMHG | SYSTOLIC BLOOD PRESSURE: 112 MMHG | BODY MASS INDEX: 50.02 KG/M2 | WEIGHT: 293 LBS | HEIGHT: 64 IN | HEART RATE: 112 BPM

## 2022-12-20 DIAGNOSIS — I48.0 PAROXYSMAL ATRIAL FIBRILLATION: ICD-10-CM

## 2022-12-20 DIAGNOSIS — I48.91 NEW ONSET ATRIAL FIBRILLATION: Primary | ICD-10-CM

## 2022-12-20 DIAGNOSIS — I10 ESSENTIAL HYPERTENSION: ICD-10-CM

## 2022-12-20 PROCEDURE — 99999 PR PBB SHADOW E&M-EST. PATIENT-LVL III: ICD-10-PCS | Mod: PBBFAC,,, | Performed by: INTERNAL MEDICINE

## 2022-12-20 PROCEDURE — 1111F PR DISCHARGE MEDS RECONCILED W/ CURRENT OUTPATIENT MED LIST: ICD-10-PCS | Mod: CPTII,S$GLB,, | Performed by: INTERNAL MEDICINE

## 2022-12-20 PROCEDURE — 99214 OFFICE O/P EST MOD 30 MIN: CPT | Mod: S$GLB,,, | Performed by: INTERNAL MEDICINE

## 2022-12-20 PROCEDURE — 1111F DSCHRG MED/CURRENT MED MERGE: CPT | Mod: CPTII,S$GLB,, | Performed by: INTERNAL MEDICINE

## 2022-12-20 PROCEDURE — 3008F BODY MASS INDEX DOCD: CPT | Mod: CPTII,S$GLB,, | Performed by: INTERNAL MEDICINE

## 2022-12-20 PROCEDURE — 3074F PR MOST RECENT SYSTOLIC BLOOD PRESSURE < 130 MM HG: ICD-10-PCS | Mod: CPTII,S$GLB,, | Performed by: INTERNAL MEDICINE

## 2022-12-20 PROCEDURE — 93000 EKG 12-LEAD: ICD-10-PCS | Mod: S$GLB,,, | Performed by: INTERNAL MEDICINE

## 2022-12-20 PROCEDURE — 99999 PR PBB SHADOW E&M-EST. PATIENT-LVL III: CPT | Mod: PBBFAC,,, | Performed by: INTERNAL MEDICINE

## 2022-12-20 PROCEDURE — 1159F PR MEDICATION LIST DOCUMENTED IN MEDICAL RECORD: ICD-10-PCS | Mod: CPTII,S$GLB,, | Performed by: INTERNAL MEDICINE

## 2022-12-20 PROCEDURE — 1159F MED LIST DOCD IN RCRD: CPT | Mod: CPTII,S$GLB,, | Performed by: INTERNAL MEDICINE

## 2022-12-20 PROCEDURE — 3074F SYST BP LT 130 MM HG: CPT | Mod: CPTII,S$GLB,, | Performed by: INTERNAL MEDICINE

## 2022-12-20 PROCEDURE — 3008F PR BODY MASS INDEX (BMI) DOCUMENTED: ICD-10-PCS | Mod: CPTII,S$GLB,, | Performed by: INTERNAL MEDICINE

## 2022-12-20 PROCEDURE — 3078F PR MOST RECENT DIASTOLIC BLOOD PRESSURE < 80 MM HG: ICD-10-PCS | Mod: CPTII,S$GLB,, | Performed by: INTERNAL MEDICINE

## 2022-12-20 PROCEDURE — 3078F DIAST BP <80 MM HG: CPT | Mod: CPTII,S$GLB,, | Performed by: INTERNAL MEDICINE

## 2022-12-20 PROCEDURE — 99214 PR OFFICE/OUTPT VISIT, EST, LEVL IV, 30-39 MIN: ICD-10-PCS | Mod: S$GLB,,, | Performed by: INTERNAL MEDICINE

## 2022-12-20 PROCEDURE — 93000 ELECTROCARDIOGRAM COMPLETE: CPT | Mod: S$GLB,,, | Performed by: INTERNAL MEDICINE

## 2022-12-20 RX ORDER — AMLODIPINE BESYLATE 10 MG/1
10 TABLET ORAL DAILY
COMMUNITY
End: 2023-05-16

## 2022-12-20 RX ORDER — METHIMAZOLE 5 MG/1
5 TABLET ORAL DAILY
COMMUNITY

## 2022-12-20 NOTE — TELEPHONE ENCOUNTER
Informed by Dr. Koehler that Mrs. Junior is back in afib. She had a DCCV and underwent dofetilide loading recently. We briefly discussed option of ablation in clinic. I would like to discuss with her further on the phone. No answer received and unable to leave a voicemail. I sent her an inbasket message. Will try to call her again at her convenience.

## 2022-12-20 NOTE — TELEPHONE ENCOUNTER
Spoke with patient regarding PVI.    I spent about a half hour discussing the nature of PVI including transseptal puncture. We discussed risks and benefits at length. Our discussion included, but was not limited to the risk of death, infection, bleeding, stroke, MI, cardiac perforation, embolism, cardiac tamponade, vascular injury, valvular injury, esophageal injury, injury to phrenic nerve, pulmonary vein stenosis and other organic injury including the possibility for need for surgery or pacemaker implantation.  She understood and elected to proceed.    Plan  RF-PVI  Carto  MIRIAM day of, cancel if in sinus rhythm  Anesthesia  Hold eliquis AM of procedure  Start protonix 40mg daily 2 weeks prior and continue for 1 month after.

## 2022-12-20 NOTE — PROGRESS NOTES
Cardiology    12/20/2022  3:03 PM    Problem list  Patient Active Problem List   Diagnosis    Right knee injury    Bradycardia on ECG    Essential hypertension    Hyperthyroidism    BMI 50.0-59.9, adult    Morbid obesity    Atrial tachycardia    New onset atrial fibrillation    Chronic sinusitis    Heart failure with preserved ejection fraction    Abnormal heart rate    Visit for monitoring Tikosyn therapy       CC:  F/u    HPI:  She is here for her 3 month follow-up.  Since her last visit, she was diagnosed by Dr. Andrade in November with new onset atrial fibrillation and underwent MIRIAM guided cardioversion and then dofetilide initiation.  She was then started on Eliquis.  Dr. Andrade' plan was rhythm control strategy, avoid PPM for antiarrhythmics if possible and consider PVI if medication is not successful.  Her HCTZ was stopped since she was started on dofetilide.  She saw her PCP and amlodipine was resumed.  She denies angina, RIZVI PND.  No bleeding.      Medications  Current Outpatient Medications   Medication Sig Dispense Refill    amLODIPine (NORVASC) 10 MG tablet Take 10 mg by mouth once daily.      apixaban (ELIQUIS) 5 mg Tab Take 1 tablet (5 mg total) by mouth 2 (two) times daily. 60 tablet 11    dofetilide (TIKOSYN) 250 MCG Cap Take 1 capsule (250 mcg total) by mouth every 12 (twelve) hours. 60 capsule 11    methIMAzole (TAPAZOLE) 5 MG Tab Take 5 mg by mouth once daily.      montelukast (SINGULAIR) 10 mg tablet Take 1 tablet (10 mg total) by mouth once daily. 30 tablet 0    multivitamin (THERAGRAN) tablet Take 1 tablet by mouth once daily. (Patient not taking: Reported on 12/14/2022) 30 tablet 0     No current facility-administered medications for this visit.      Prior to Admission medications    Medication Sig Start Date End Date Taking? Authorizing Provider   amLODIPine (NORVASC) 10 MG tablet Take 10 mg by mouth once daily.   Yes Historical Provider   apixaban (ELIQUIS) 5 mg Tab Take 1 tablet (5  mg total) by mouth 2 (two) times daily. 11/22/22  Yes Prabhu Andrade MD   dofetilide (TIKOSYN) 250 MCG Cap Take 1 capsule (250 mcg total) by mouth every 12 (twelve) hours. 12/8/22 12/8/23 Yes Shelly Nathan MD   methIMAzole (TAPAZOLE) 5 MG Tab Take 5 mg by mouth once daily. 11/27/22   Historical Provider   montelukast (SINGULAIR) 10 mg tablet Take 1 tablet (10 mg total) by mouth once daily. 12/9/22 1/8/23  Shelly Nathan MD   multivitamin (THERAGRAN) tablet Take 1 tablet by mouth once daily.  Patient not taking: Reported on 12/14/2022 12/9/22 1/8/23  Shelly Nathan MD         History  Past Medical History:   Diagnosis Date    Atrial fibrillation     Hypertension     Hyperthyroidism      Past Surgical History:   Procedure Laterality Date    TREATMENT OF CARDIAC ARRHYTHMIA N/A 12/6/2022    Procedure: CARDIOVERSION;  Surgeon: Prabhu Andrade MD;  Location: [facility] EP LAB;  Service: Cardiology;  Laterality: N/A;  afib, MIRIAM( Cx if pt in SR)  DCCV, anes, [place], 3 Prep *Tikosyn Admit post procedure*    TUBAL LIGATION       Social History     Socioeconomic History    Marital status: Single   Tobacco Use    Smoking status: Never    Smokeless tobacco: Never   Substance and Sexual Activity    Alcohol use: Yes     Comment: occ    Drug use: Never         Allergies  Review of patient's allergies indicates:  No Known Allergies      Review of Systems   Review of Systems   Constitutional: Negative for decreased appetite, fever and weight loss.   HENT:  Negative for congestion and nosebleeds.    Eyes:  Negative for double vision, vision loss in left eye, vision loss in right eye and visual disturbance.   Cardiovascular:  Negative for chest pain, claudication, cyanosis, dyspnea on exertion, irregular heartbeat, leg swelling, near-syncope, orthopnea, palpitations, paroxysmal nocturnal dyspnea and syncope.   Respiratory:  Negative for cough, hemoptysis, shortness of breath, sleep disturbances due to breathing, snoring, sputum production  and wheezing.    Endocrine: Negative for cold intolerance and heat intolerance.   Skin:  Negative for nail changes and rash.   Musculoskeletal:  Negative for joint pain, muscle cramps, muscle weakness and myalgias.   Gastrointestinal:  Negative for change in bowel habit, heartburn, hematemesis, hematochezia, hemorrhoids and melena.   Neurological:  Negative for dizziness, focal weakness and headaches.       Physical Exam  Wt Readings from Last 1 Encounters:   12/07/22 134.9 kg (297 lb 6.4 oz)     BP Readings from Last 3 Encounters:   12/09/22 129/63   12/06/22 98/61   11/22/22 128/81     Pulse Readings from Last 1 Encounters:   12/09/22 (!) 42     There is no height or weight on file to calculate BMI.    Physical Exam  Vitals reviewed.   Constitutional:       Appearance: She is well-developed. She is obese.   HENT:      Head: Atraumatic.   Eyes:      General: No scleral icterus.  Neck:      Vascular: Normal carotid pulses. No carotid bruit, hepatojugular reflux or JVD.   Cardiovascular:      Rate and Rhythm: Tachycardia present. Rhythm irregularly irregular.      Chest Wall: PMI is not displaced.      Pulses: Intact distal pulses.           Carotid pulses are 2+ on the right side and 2+ on the left side.       Radial pulses are 2+ on the right side and 2+ on the left side.        Dorsalis pedis pulses are 2+ on the right side and 2+ on the left side.      Heart sounds: S1 normal and S2 normal. Murmur heard.   Early systolic murmur is present with a grade of 2/6 at the upper right sternal border.     No friction rub.   Pulmonary:      Effort: Pulmonary effort is normal. No respiratory distress.      Breath sounds: Normal breath sounds. No stridor. No wheezing or rales.   Chest:      Chest wall: No tenderness.   Abdominal:      General: Bowel sounds are normal.      Palpations: Abdomen is soft.   Musculoskeletal:      Cervical back: Neck supple. No edema.      Right lower leg: No edema.      Left lower leg: No edema.    Skin:     General: Skin is warm and dry.      Nails: There is no clubbing.   Neurological:      Mental Status: She is alert and oriented to person, place, and time.   Psychiatric:         Behavior: Behavior normal.         Thought Content: Thought content normal.       EKG:  afib, rate 110    Assessment  1. New onset atrial fibrillation  Afib on EKG today with , normotensive.  On Eliquis    2. Essential hypertension  Stable on meds, continue to monitor    3. BMI 50.0-59.9, adult  unchanged          Plan and Discussion  Discussed that she is back in atrial fibrillation after cardioversion dofetilide loading in November.  She is asymptomatic.  Her heart rate is 110.  She did not tolerate beta blockers in past as she developed symptomatic bradycardia.  She is on Eliquis for stroke prophylaxis.  Her appointment with EP is next month.  Will contact Dr. Conway.    Follow Up  2 months      Montana Koehler MD, F.A.C.C, F.S.C.A.I.        35 minutes were spent in chart review, documentation and review of results, and evaluation, treatment, and counseling of patient on the same day of service.

## 2023-01-04 DIAGNOSIS — I48.91 ATRIAL FIBRILLATION: ICD-10-CM

## 2023-01-04 RX ORDER — DOFETILIDE 0.25 MG/1
250 CAPSULE ORAL EVERY 12 HOURS
Qty: 60 CAPSULE | Refills: 11 | Status: ON HOLD | OUTPATIENT
Start: 2023-01-04 | End: 2023-02-17 | Stop reason: HOSPADM

## 2023-01-06 ENCOUNTER — TELEPHONE (OUTPATIENT)
Dept: CARDIOLOGY | Facility: CLINIC | Age: 63
End: 2023-01-06
Payer: COMMERCIAL

## 2023-01-06 NOTE — TELEPHONE ENCOUNTER
----- Message from Darline Ayala MA sent at 1/5/2023  4:25 PM CST -----  Contact: Patient, [phone]    ----- Message -----  From: Naa Frank  Sent: 1/5/2023   4:17 PM CST  To: Zakia Nelson Staff    Calling to speak with the nurse regarding Rx dofetilide (TIKOSYN) 250 MCG Cap. Please call her. Thanks.

## 2023-01-12 DIAGNOSIS — E66.01 MORBID OBESITY: ICD-10-CM

## 2023-01-12 DIAGNOSIS — Z01.818 PRE-OP TESTING: ICD-10-CM

## 2023-01-12 DIAGNOSIS — I48.0 PAROXYSMAL ATRIAL FIBRILLATION: Primary | ICD-10-CM

## 2023-01-12 DIAGNOSIS — I10 ESSENTIAL HYPERTENSION: ICD-10-CM

## 2023-01-12 DIAGNOSIS — I50.30 HEART FAILURE WITH PRESERVED EJECTION FRACTION, UNSPECIFIED HF CHRONICITY: ICD-10-CM

## 2023-01-25 PROBLEM — I48.19 PERSISTENT ATRIAL FIBRILLATION: Status: ACTIVE | Noted: 2022-11-22

## 2023-01-25 RX ORDER — PANTOPRAZOLE SODIUM 40 MG/1
40 TABLET, DELAYED RELEASE ORAL DAILY
Qty: 90 TABLET | Refills: 0 | Status: SHIPPED | OUTPATIENT
Start: 2023-01-25 | End: 2023-05-16

## 2023-02-08 ENCOUNTER — TELEPHONE (OUTPATIENT)
Dept: ELECTROPHYSIOLOGY | Facility: CLINIC | Age: 63
End: 2023-02-08
Payer: COMMERCIAL

## 2023-02-08 NOTE — TELEPHONE ENCOUNTER
Message received from the Financial Clearance Department regarding patient having a liability balance.  I spoke with the patient and she said that she was contacted this morning by the Financial Department about her financial responsibility for the procedure, however she was unable to cover the cost, so she was instructed to fax her financial information to the department to see if she would qualify for patient assistance and never heard anything back from anyone. Patient advised that I will reach out to the Financial Department and request that they contact her to discuss her status with the  patient assistance process. Patient instructed to contact me as soon as she receives the determination regarding the patient assistance to let me know if she will need to postpone her procedure. Understanding verbalized.

## 2023-02-08 NOTE — TELEPHONE ENCOUNTER
----- Message from Digna Barker MA sent at 2/8/2023  4:21 PM CST -----  The patient is returning your phone call please call [phone]. Thank you.

## 2023-02-10 LAB
BASOPHILS # BLD AUTO: 52 CELLS/UL (ref 0–200)
BASOPHILS NFR BLD AUTO: 0.8 %
BUN SERPL-MCNC: 12 MG/DL (ref 7–25)
BUN/CREAT SERPL: NORMAL (CALC) (ref 6–22)
CALCIUM SERPL-MCNC: 9.4 MG/DL (ref 8.6–10.4)
CHLORIDE SERPL-SCNC: 106 MMOL/L (ref 98–110)
CO2 SERPL-SCNC: 28 MMOL/L (ref 20–32)
CREAT SERPL-MCNC: 0.76 MG/DL (ref 0.5–1.05)
EGFR: 88 ML/MIN/1.73M2
EOSINOPHIL # BLD AUTO: 293 CELLS/UL (ref 15–500)
EOSINOPHIL NFR BLD AUTO: 4.5 %
ERYTHROCYTE [DISTWIDTH] IN BLOOD BY AUTOMATED COUNT: 13.9 % (ref 11–15)
GLUCOSE SERPL-MCNC: 81 MG/DL (ref 65–99)
HCT VFR BLD AUTO: 37.8 % (ref 35–45)
HGB BLD-MCNC: 12.6 G/DL (ref 11.7–15.5)
INR PPP: 1
LYMPHOCYTES # BLD AUTO: 2503 CELLS/UL (ref 850–3900)
LYMPHOCYTES NFR BLD AUTO: 38.5 %
MCH RBC QN AUTO: 30.1 PG (ref 27–33)
MCHC RBC AUTO-ENTMCNC: 33.3 G/DL (ref 32–36)
MCV RBC AUTO: 90.4 FL (ref 80–100)
MONOCYTES # BLD AUTO: 566 CELLS/UL (ref 200–950)
MONOCYTES NFR BLD AUTO: 8.7 %
NEUTROPHILS # BLD AUTO: 3088 CELLS/UL (ref 1500–7800)
NEUTROPHILS NFR BLD AUTO: 47.5 %
PLATELET # BLD AUTO: 252 THOUSAND/UL (ref 140–400)
PMV BLD REES-ECKER: 12.1 FL (ref 7.5–12.5)
POTASSIUM SERPL-SCNC: 4 MMOL/L (ref 3.5–5.3)
PROTHROMBIN TIME: 10.6 SEC (ref 9–11.5)
RBC # BLD AUTO: 4.18 MILLION/UL (ref 3.8–5.1)
SODIUM SERPL-SCNC: 142 MMOL/L (ref 135–146)
WBC # BLD AUTO: 6.5 THOUSAND/UL (ref 3.8–10.8)

## 2023-02-15 ENCOUNTER — TELEPHONE (OUTPATIENT)
Dept: ELECTROPHYSIOLOGY | Facility: CLINIC | Age: 63
End: 2023-02-15
Payer: COMMERCIAL

## 2023-02-15 ENCOUNTER — ANESTHESIA EVENT (OUTPATIENT)
Dept: MEDSURG UNIT | Facility: HOSPITAL | Age: 63
End: 2023-02-15
Payer: COMMERCIAL

## 2023-02-15 NOTE — TELEPHONE ENCOUNTER
Spoke to Patient    CONFIRMED procedure arrival time of 9am on 2/16/2023 for PVI ablation with Dr Andrade.    Reiterated instructions including:  -Directions to check in desk.    -NPO after midnight night prior to procedure. FASTING upon arrival to the hospital.     -High importance of HOLDING Eliquis the day of the procedure.   -Confirmed compliance of Eliquis and Protonix as prescribed.    -Pre-procedure LABS reviewed with no alerts noted.     -Confirmed absence or presence of implanted device/stimulator N/A    -Confirmed no fever, cough, or shortness of breath in the past 30 days.    -Confirmed no redness, rash, irritation, or yeast infection to groin area.     Patient verbalized understanding of above,denied any further questions and appreciated the call.

## 2023-02-15 NOTE — ANESTHESIA PREPROCEDURE EVALUATION
02/15/2023  Roxane Junior is a 62 y.o., female   Patient Active Problem List   Diagnosis    Right knee injury    Bradycardia on ECG    Essential hypertension    Hyperthyroidism    BMI 50.0-59.9, adult    Morbid obesity    Atrial tachycardia    Persistent atrial fibrillation    Chronic sinusitis    Heart failure with preserved ejection fraction    Abnormal heart rate    Visit for monitoring Tikosyn therapy     .      Pre-op Assessment          Review of Systems      Physical Exam    Airway:  No airway management difficulties anticipated  Dental:No active dental issues noted  Chest/Lungs:  Clear to auscultation    Heart:  Rate: Normal  Rhythm: Regular Rhythm  Sounds: Normal        Anesthesia Plan  Type of Anesthesia, risks & benefits discussed:    Anesthesia Type: Gen ETT  Intra-op Monitoring Plan: Art Line  Informed Consent: Informed consent signed with the Patient and all parties understand the risks and agree with anesthesia plan.  All questions answered.   ASA Score: 3  Anesthesia Plan Notes: Chart reviewed. Patient seen and examined. Anesthesia plan discussed and questions answered. E-consent signed. Seun Boyer MD    Ready For Surgery From Anesthesia Perspective.     .

## 2023-02-16 ENCOUNTER — HOSPITAL ENCOUNTER (OUTPATIENT)
Facility: HOSPITAL | Age: 63
Discharge: HOME | End: 2023-02-17
Attending: INTERNAL MEDICINE | Admitting: INTERNAL MEDICINE
Payer: COMMERCIAL

## 2023-02-16 ENCOUNTER — HOSPITAL ENCOUNTER (OUTPATIENT)
Dept: CARDIOLOGY | Facility: HOSPITAL | Age: 63
Discharge: HOME | End: 2023-02-16
Attending: INTERNAL MEDICINE | Admitting: INTERNAL MEDICINE
Payer: COMMERCIAL

## 2023-02-16 ENCOUNTER — ANESTHESIA (OUTPATIENT)
Dept: MEDSURG UNIT | Facility: HOSPITAL | Age: 63
End: 2023-02-16
Payer: COMMERCIAL

## 2023-02-16 DIAGNOSIS — I50.30 HEART FAILURE WITH PRESERVED EJECTION FRACTION, UNSPECIFIED HF CHRONICITY: ICD-10-CM

## 2023-02-16 DIAGNOSIS — I49.9 ARRHYTHMIA: ICD-10-CM

## 2023-02-16 DIAGNOSIS — Z98.890 STATUS POST ABLATION OPERATION FOR ARRHYTHMIA: ICD-10-CM

## 2023-02-16 DIAGNOSIS — R00.1 JUNCTIONAL BRADYCARDIA: ICD-10-CM

## 2023-02-16 DIAGNOSIS — I48.0 PAROXYSMAL ATRIAL FIBRILLATION: ICD-10-CM

## 2023-02-16 DIAGNOSIS — Z86.79 STATUS POST ABLATION OPERATION FOR ARRHYTHMIA: ICD-10-CM

## 2023-02-16 DIAGNOSIS — I48.91 ATRIAL FIBRILLATION: ICD-10-CM

## 2023-02-16 LAB
AORTIC ROOT ANNULUS: 2.1 CM
ASCENDING AORTA: 3.1 CM
BSA FOR ECHO PROCEDURE: 2.46 M2
EJECTION FRACTION: 55 %
PROX AORTA: 2.8 CM
SINUS: 3.1 CM
STJ: 2.7 CM

## 2023-02-16 PROCEDURE — 93325 DOPPLER ECHO COLOR FLOW MAPG: CPT

## 2023-02-16 PROCEDURE — 93312 TRANSESOPHAGEAL ECHO (TEE) (CUPID ONLY): ICD-10-PCS | Mod: 26,,, | Performed by: INTERNAL MEDICINE

## 2023-02-16 PROCEDURE — 36620 INSERTION CATHETER ARTERY: CPT | Mod: 59,,, | Performed by: ANESTHESIOLOGY

## 2023-02-16 PROCEDURE — 37000009 HC ANESTHESIA EA ADD 15 MINS: Performed by: INTERNAL MEDICINE

## 2023-02-16 PROCEDURE — 93312 ECHO TRANSESOPHAGEAL: CPT | Mod: 26,,, | Performed by: INTERNAL MEDICINE

## 2023-02-16 PROCEDURE — 93656 COMPRE EP EVAL ABLTJ ATR FIB: CPT | Mod: ,,, | Performed by: INTERNAL MEDICINE

## 2023-02-16 PROCEDURE — C1731 CATH, EP, 20 OR MORE ELEC: HCPCS | Performed by: INTERNAL MEDICINE

## 2023-02-16 PROCEDURE — 27000221 HC OXYGEN, UP TO 24 HOURS

## 2023-02-16 PROCEDURE — D9220A PRA ANESTHESIA: ICD-10-PCS | Mod: ANES,,, | Performed by: ANESTHESIOLOGY

## 2023-02-16 PROCEDURE — 93320 TRANSESOPHAGEAL ECHO (TEE) (CUPID ONLY): ICD-10-PCS | Mod: 26,,, | Performed by: INTERNAL MEDICINE

## 2023-02-16 PROCEDURE — D9220A PRA ANESTHESIA: Mod: CRNA,,, | Performed by: NURSE ANESTHETIST, CERTIFIED REGISTERED

## 2023-02-16 PROCEDURE — D9220A PRA ANESTHESIA: Mod: ANES,,, | Performed by: ANESTHESIOLOGY

## 2023-02-16 PROCEDURE — D9220A PRA ANESTHESIA: ICD-10-PCS | Mod: CRNA,,, | Performed by: NURSE ANESTHETIST, CERTIFIED REGISTERED

## 2023-02-16 PROCEDURE — 93325 TRANSESOPHAGEAL ECHO (TEE) (CUPID ONLY): ICD-10-PCS | Mod: 26,,, | Performed by: INTERNAL MEDICINE

## 2023-02-16 PROCEDURE — 25000003 PHARM REV CODE 250: Performed by: INTERNAL MEDICINE

## 2023-02-16 PROCEDURE — 94761 N-INVAS EAR/PLS OXIMETRY MLT: CPT

## 2023-02-16 PROCEDURE — 93005 ELECTROCARDIOGRAM TRACING: CPT

## 2023-02-16 PROCEDURE — 93656 PR ELECTROPHYS EVAL, COMPREHEN, W/ABLATION OF ATRIAL FIBR: ICD-10-PCS | Mod: ,,, | Performed by: INTERNAL MEDICINE

## 2023-02-16 PROCEDURE — C1732 CATH, EP, DIAG/ABL, 3D/VECT: HCPCS | Performed by: INTERNAL MEDICINE

## 2023-02-16 PROCEDURE — 93010 ELECTROCARDIOGRAM REPORT: CPT | Mod: ,,, | Performed by: INTERNAL MEDICINE

## 2023-02-16 PROCEDURE — 27201037 HC PRESSURE MONITORING SET UP

## 2023-02-16 PROCEDURE — 93656 COMPRE EP EVAL ABLTJ ATR FIB: CPT | Performed by: INTERNAL MEDICINE

## 2023-02-16 PROCEDURE — 36620 ARTERIAL: ICD-10-PCS | Mod: 59,,, | Performed by: ANESTHESIOLOGY

## 2023-02-16 PROCEDURE — 25000003 PHARM REV CODE 250: Performed by: NURSE ANESTHETIST, CERTIFIED REGISTERED

## 2023-02-16 PROCEDURE — 63600175 PHARM REV CODE 636 W HCPCS: Performed by: NURSE ANESTHETIST, CERTIFIED REGISTERED

## 2023-02-16 PROCEDURE — 93010 EKG 12-LEAD: ICD-10-PCS | Mod: ,,, | Performed by: INTERNAL MEDICINE

## 2023-02-16 PROCEDURE — C1766 INTRO/SHEATH,STRBLE,NON-PEEL: HCPCS | Performed by: INTERNAL MEDICINE

## 2023-02-16 PROCEDURE — 63600175 PHARM REV CODE 636 W HCPCS: Performed by: INTERNAL MEDICINE

## 2023-02-16 PROCEDURE — 93320 DOPPLER ECHO COMPLETE: CPT | Mod: 26,,, | Performed by: INTERNAL MEDICINE

## 2023-02-16 PROCEDURE — C1894 INTRO/SHEATH, NON-LASER: HCPCS | Performed by: INTERNAL MEDICINE

## 2023-02-16 PROCEDURE — 27201423 OPTIME MED/SURG SUP & DEVICES STERILE SUPPLY: Performed by: INTERNAL MEDICINE

## 2023-02-16 PROCEDURE — C1753 CATH, INTRAVAS ULTRASOUND: HCPCS | Performed by: INTERNAL MEDICINE

## 2023-02-16 PROCEDURE — 37000008 HC ANESTHESIA 1ST 15 MINUTES: Performed by: INTERNAL MEDICINE

## 2023-02-16 PROCEDURE — 93005 ELECTROCARDIOGRAM TRACING: CPT | Mod: 59

## 2023-02-16 PROCEDURE — 93325 DOPPLER ECHO COLOR FLOW MAPG: CPT | Mod: 26,,, | Performed by: INTERNAL MEDICINE

## 2023-02-16 PROCEDURE — C1730 CATH, EP, 19 OR FEW ELECT: HCPCS | Performed by: INTERNAL MEDICINE

## 2023-02-16 RX ORDER — PHENYLEPHRINE HYDROCHLORIDE 10 MG/ML
INJECTION INTRAVENOUS
Status: DISCONTINUED | OUTPATIENT
Start: 2023-02-16 | End: 2023-02-17

## 2023-02-16 RX ORDER — DEXAMETHASONE SODIUM PHOSPHATE 4 MG/ML
INJECTION, SOLUTION INTRA-ARTICULAR; INTRALESIONAL; INTRAMUSCULAR; INTRAVENOUS; SOFT TISSUE
Status: DISCONTINUED | OUTPATIENT
Start: 2023-02-16 | End: 2023-02-17

## 2023-02-16 RX ORDER — MIDAZOLAM HYDROCHLORIDE 1 MG/ML
INJECTION, SOLUTION INTRAMUSCULAR; INTRAVENOUS
Status: DISCONTINUED | OUTPATIENT
Start: 2023-02-16 | End: 2023-02-17

## 2023-02-16 RX ORDER — PROPOFOL 10 MG/ML
VIAL (ML) INTRAVENOUS
Status: DISCONTINUED | OUTPATIENT
Start: 2023-02-16 | End: 2023-02-17

## 2023-02-16 RX ORDER — LIDOCAINE HYDROCHLORIDE 20 MG/ML
INJECTION, SOLUTION INFILTRATION; PERINEURAL
Status: DISCONTINUED | OUTPATIENT
Start: 2023-02-16 | End: 2023-02-17 | Stop reason: HOSPADM

## 2023-02-16 RX ORDER — HYDROMORPHONE HYDROCHLORIDE 1 MG/ML
0.2 INJECTION, SOLUTION INTRAMUSCULAR; INTRAVENOUS; SUBCUTANEOUS EVERY 5 MIN PRN
Status: DISCONTINUED | OUTPATIENT
Start: 2023-02-16 | End: 2023-02-17 | Stop reason: HOSPADM

## 2023-02-16 RX ORDER — SUCCINYLCHOLINE CHLORIDE 20 MG/ML
INJECTION INTRAMUSCULAR; INTRAVENOUS
Status: DISCONTINUED | OUTPATIENT
Start: 2023-02-16 | End: 2023-02-17

## 2023-02-16 RX ORDER — LIDOCAINE HYDROCHLORIDE 20 MG/ML
INJECTION, SOLUTION EPIDURAL; INFILTRATION; INTRACAUDAL; PERINEURAL
Status: DISCONTINUED | OUTPATIENT
Start: 2023-02-16 | End: 2023-02-17

## 2023-02-16 RX ORDER — ROCURONIUM BROMIDE 10 MG/ML
INJECTION, SOLUTION INTRAVENOUS
Status: DISCONTINUED | OUTPATIENT
Start: 2023-02-16 | End: 2023-02-17

## 2023-02-16 RX ORDER — HEPARIN SODIUM 10000 [USP'U]/100ML
INJECTION, SOLUTION INTRAVENOUS CONTINUOUS PRN
Status: DISCONTINUED | OUTPATIENT
Start: 2023-02-16 | End: 2023-02-17

## 2023-02-16 RX ORDER — DIPHENHYDRAMINE HYDROCHLORIDE 50 MG/ML
25 INJECTION INTRAMUSCULAR; INTRAVENOUS EVERY 6 HOURS PRN
Status: DISCONTINUED | OUTPATIENT
Start: 2023-02-16 | End: 2023-02-17 | Stop reason: HOSPADM

## 2023-02-16 RX ORDER — FUROSEMIDE 10 MG/ML
INJECTION INTRAMUSCULAR; INTRAVENOUS
Status: DISCONTINUED | OUTPATIENT
Start: 2023-02-16 | End: 2023-02-17

## 2023-02-16 RX ORDER — ONDANSETRON 2 MG/ML
INJECTION INTRAMUSCULAR; INTRAVENOUS
Status: DISCONTINUED | OUTPATIENT
Start: 2023-02-16 | End: 2023-02-17

## 2023-02-16 RX ORDER — HEPARIN SODIUM 1000 [USP'U]/ML
INJECTION, SOLUTION INTRAVENOUS; SUBCUTANEOUS
Status: DISCONTINUED | OUTPATIENT
Start: 2023-02-16 | End: 2023-02-17

## 2023-02-16 RX ORDER — PROTAMINE SULFATE 10 MG/ML
INJECTION, SOLUTION INTRAVENOUS
Status: DISCONTINUED | OUTPATIENT
Start: 2023-02-16 | End: 2023-02-17

## 2023-02-16 RX ORDER — ONDANSETRON 2 MG/ML
4 INJECTION INTRAMUSCULAR; INTRAVENOUS ONCE AS NEEDED
Status: DISCONTINUED | OUTPATIENT
Start: 2023-02-16 | End: 2023-02-17 | Stop reason: HOSPADM

## 2023-02-16 RX ORDER — DOFETILIDE 0.25 MG/1
250 CAPSULE ORAL EVERY 12 HOURS
Status: DISCONTINUED | OUTPATIENT
Start: 2023-02-16 | End: 2023-02-16

## 2023-02-16 RX ORDER — PANTOPRAZOLE SODIUM 40 MG/1
40 TABLET, DELAYED RELEASE ORAL DAILY
Status: DISCONTINUED | OUTPATIENT
Start: 2023-02-17 | End: 2023-02-17 | Stop reason: HOSPADM

## 2023-02-16 RX ORDER — FENTANYL CITRATE 50 UG/ML
25 INJECTION, SOLUTION INTRAMUSCULAR; INTRAVENOUS EVERY 5 MIN PRN
Status: DISCONTINUED | OUTPATIENT
Start: 2023-02-16 | End: 2023-02-17 | Stop reason: HOSPADM

## 2023-02-16 RX ORDER — HEPARIN SOD,PORCINE/0.9 % NACL 1000/500ML
INTRAVENOUS SOLUTION INTRAVENOUS
Status: DISCONTINUED | OUTPATIENT
Start: 2023-02-16 | End: 2023-02-17 | Stop reason: HOSPADM

## 2023-02-16 RX ORDER — DOFETILIDE 0.25 MG/1
250 CAPSULE ORAL EVERY 12 HOURS
Status: DISCONTINUED | OUTPATIENT
Start: 2023-02-16 | End: 2023-02-17 | Stop reason: HOSPADM

## 2023-02-16 RX ORDER — FENTANYL CITRATE 50 UG/ML
INJECTION, SOLUTION INTRAMUSCULAR; INTRAVENOUS
Status: DISCONTINUED | OUTPATIENT
Start: 2023-02-16 | End: 2023-02-17

## 2023-02-16 RX ORDER — ACETAMINOPHEN 325 MG/1
650 TABLET ORAL EVERY 4 HOURS PRN
Status: DISCONTINUED | OUTPATIENT
Start: 2023-02-16 | End: 2023-02-17 | Stop reason: HOSPADM

## 2023-02-16 RX ADMIN — FENTANYL CITRATE 50 MCG: 50 INJECTION, SOLUTION INTRAMUSCULAR; INTRAVENOUS at 04:02

## 2023-02-16 RX ADMIN — PROTAMINE SULFATE 80 MG: 10 INJECTION, SOLUTION INTRAVENOUS at 05:02

## 2023-02-16 RX ADMIN — APIXABAN 5 MG: 5 TABLET, FILM COATED ORAL at 10:02

## 2023-02-16 RX ADMIN — DEXAMETHASONE SODIUM PHOSPHATE 4 MG: 4 INJECTION INTRA-ARTICULAR; INTRALESIONAL; INTRAMUSCULAR; INTRAVENOUS; SOFT TISSUE at 04:02

## 2023-02-16 RX ADMIN — HEPARIN SODIUM 12 UNITS/KG/HR: 10000 INJECTION, SOLUTION INTRAVENOUS at 03:02

## 2023-02-16 RX ADMIN — HEPARIN SODIUM 15000 UNITS: 1000 INJECTION, SOLUTION INTRAVENOUS; SUBCUTANEOUS at 03:02

## 2023-02-16 RX ADMIN — HEPARIN SODIUM 3000 UNITS: 1000 INJECTION, SOLUTION INTRAVENOUS; SUBCUTANEOUS at 05:02

## 2023-02-16 RX ADMIN — HEPARIN SODIUM 3000 UNITS: 1000 INJECTION, SOLUTION INTRAVENOUS; SUBCUTANEOUS at 04:02

## 2023-02-16 RX ADMIN — SODIUM CHLORIDE: 0.9 INJECTION, SOLUTION INTRAVENOUS at 02:02

## 2023-02-16 RX ADMIN — FENTANYL CITRATE 50 MCG: 50 INJECTION, SOLUTION INTRAMUSCULAR; INTRAVENOUS at 02:02

## 2023-02-16 RX ADMIN — LIDOCAINE HYDROCHLORIDE 50 MG: 20 INJECTION, SOLUTION EPIDURAL; INFILTRATION; INTRACAUDAL; PERINEURAL at 02:02

## 2023-02-16 RX ADMIN — PHENYLEPHRINE HYDROCHLORIDE 200 MCG: 10 INJECTION INTRAVENOUS at 02:02

## 2023-02-16 RX ADMIN — PHENYLEPHRINE HYDROCHLORIDE 100 MCG: 10 INJECTION INTRAVENOUS at 02:02

## 2023-02-16 RX ADMIN — PROPOFOL 100 MG: 10 INJECTION, EMULSION INTRAVENOUS at 03:02

## 2023-02-16 RX ADMIN — ONDANSETRON 4 MG: 2 INJECTION INTRAMUSCULAR; INTRAVENOUS at 05:02

## 2023-02-16 RX ADMIN — SUCCINYLCHOLINE CHLORIDE 140 MG: 20 INJECTION, SOLUTION INTRAMUSCULAR; INTRAVENOUS; PARENTERAL at 02:02

## 2023-02-16 RX ADMIN — FUROSEMIDE 40 MG: 10 INJECTION, SOLUTION INTRAMUSCULAR; INTRAVENOUS at 05:02

## 2023-02-16 RX ADMIN — PROPOFOL 50 MG: 10 INJECTION, EMULSION INTRAVENOUS at 02:02

## 2023-02-16 RX ADMIN — ROCURONIUM BROMIDE 10 MG: 10 INJECTION INTRAVENOUS at 02:02

## 2023-02-16 RX ADMIN — PROPOFOL 150 MG: 10 INJECTION, EMULSION INTRAVENOUS at 02:02

## 2023-02-16 RX ADMIN — PROPOFOL 100 MG: 10 INJECTION, EMULSION INTRAVENOUS at 04:02

## 2023-02-16 RX ADMIN — PHENYLEPHRINE HYDROCHLORIDE 100 MCG: 10 INJECTION INTRAVENOUS at 03:02

## 2023-02-16 RX ADMIN — SODIUM CHLORIDE 0.25 MCG/KG/MIN: 9 INJECTION, SOLUTION INTRAVENOUS at 02:02

## 2023-02-16 RX ADMIN — SODIUM CHLORIDE, SODIUM GLUCONATE, SODIUM ACETATE, POTASSIUM CHLORIDE, MAGNESIUM CHLORIDE, SODIUM PHOSPHATE, DIBASIC, AND POTASSIUM PHOSPHATE: .53; .5; .37; .037; .03; .012; .00082 INJECTION, SOLUTION INTRAVENOUS at 02:02

## 2023-02-16 RX ADMIN — FENTANYL CITRATE 50 MCG: 50 INJECTION, SOLUTION INTRAMUSCULAR; INTRAVENOUS at 03:02

## 2023-02-16 RX ADMIN — DOFETILIDE 250 MCG: 0.25 CAPSULE ORAL at 10:02

## 2023-02-16 RX ADMIN — MIDAZOLAM 2 MG: 1 INJECTION INTRAMUSCULAR; INTRAVENOUS at 02:02

## 2023-02-16 NOTE — ASSESSMENT & PLAN NOTE
Presents for ablation with Dr. Andrade    1. MIRIAM for evaluation of ELIZABETH thrombus  -No absolute contraindications of esophageal stricture, tumor, perforation, laceration,or diverticulum and/or active GI bleed  -The risks, benefits & alternatives of the procedure were explained to the patient.   -The risks of transesophageal echo include but are not limited to:  Dental trauma, esophageal trauma/perforation, bleeding, laryngospasm/brochospasm, aspiration, sore throat/hoarseness, & dislodgement of the endotracheal tube/nasogastric tube (where applicable).    -The risks of moderate sedation include hypotension, respiratory depression, arrhythmias, bronchospasm, & death.    -Informed consent was obtained. The patient is agreeable to proceed with the procedure and all questions and concerns addressed.    Case discussed with an attending in echocardiography lab.     Further recommendations per attending addendum

## 2023-02-16 NOTE — LETTER
"               February 17,2023    [address]  [place] [address]  PHONE:[phone]  FAX:[phone]      Patient:Roxane Junior (Iris)  YOB: 1960  Date of Visit: 02/17/2023    To Whom It May Concern:    Roxane Junior was at [facility] on 02/17/2023.She is ready to return to work from 02/27/23 without restrictions.If you have any questions or concerns, or if I can be of further assistance, please do not hesitate to contact me.    Sincerely,  Harjit Patel MD  "

## 2023-02-16 NOTE — ANESTHESIA PROCEDURE NOTES
Arterial    Diagnosis: a fib    Patient location during procedure: done in OR    Staffing  Authorizing Provider: Seun Boyer MD  Performing Provider: Seun Boyer MD    Anesthesiologist was present at the time of the procedure.  Arterial  Skin Prep: chlorhexidine gluconate  Local Infiltration: none  Orientation: right  Location: radial    Catheter Size: 20 G  Catheter placement by Ultrasound guidance. Heme positive aspiration all ports.   Vessel Caliber: patent  Needle advanced into vessel with real time Ultrasound guidance.Insertion Attempts: 1  Assessment  Dressing: secured with tape and tegaderm

## 2023-02-16 NOTE — ANESTHESIA PROCEDURE NOTES
Intubation    Date/Time: 2/16/2023 2:29 PM  Performed by: Agustín Junior III, CRNA  Authorized by: Seun Boyer MD     Intubation:     Intubated:  Postinduction    Mask Ventilation:  Easy mask    Attempts:  1    Attempted By:  FABIANO    Blade:  Garcia 4    Laryngeal View Grade: Grade I - full view of cords      Difficult Airway Encountered?: No      Complications:  None    Airway Device:  Oral endotracheal tube    Airway Device Size:  7.5    Style/Cuff Inflation:  Cuffed (inflated to minimal occlusive pressure)    Inflation Amount (mL):  6    Tube secured:  21    Secured at:  The lips

## 2023-02-16 NOTE — CONSULTS
[facility] Short Stay Cardiac [facility]  Cardiology  Consult Note    Patient Name: Roxane Junior  MRN: [id number]  Admission Date: 2/16/2023  Hospital Length of Stay: 0 days  Code Status: Prior   Attending Provider: Prabhu Andrade MD   Consulting Provider: Jose Alfredo Amaya MD  Primary Care Physician: Dinesh Pacheco MD  Principal Problem:<principal problem not specified>    Patient information was obtained from patient and ER records.     Consults  Subjective:     Chief Complaint:  AFib     HPI:   Ms. Junior is a 62 year old W w/ PMHx with hypertension, morbid obesity, asymptomatic sinus bradycardia, and Afib who presents for ablation with Dr. Andrade.     She was admitted to [facility] after scheduled MIRIAM/DCCV due to AF with plan for Tikosyn post procedure with Dr. Andrade. Denied any chest pain, SOB, RIZVI, dizziness, light headedness, weakness, LE swelling, syncope, or near syncopal episodes. NPO since yesterday.       The left ventricle is normal in size with Moderately decreased systolic function.The estimated ejection fraction is 40% (at a heart rate of 150bpm)   Normal right ventricular size with mildly to moderately reduced right ventricular systolic function.   No interatrial septal defect present.   Normal appearing left atrial appendage. No thrombus is present in the appendage.   Grade 2 plaque present.   The estimated ejection fraction is 40%.   Mild mitral regurgitation.   Left ventricular diastolic dysfunction.      Past Medical History:   Diagnosis Date    Atrial fibrillation     Hypertension     Hyperthyroidism        Past Surgical History:   Procedure Laterality Date    TREATMENT OF CARDIAC ARRHYTHMIA N/A 12/6/2022    Procedure: CARDIOVERSION;  Surgeon: Prabhu Andrade MD;  Location: [facility] EP LAB;  Service: Cardiology;  Laterality: N/A;  afib, MIRIAM( Cx if pt in SR)  DCCV, [place], [place], 3 Prep *Tikosyn Admit post procedure*    TUBAL LIGATION         Review of patient's allergies indicates:  No Known  Allergies    No current facility-administered medications on file prior to encounter.     Current Outpatient Medications on File Prior to Encounter   Medication Sig    amLODIPine (NORVASC) 10 MG tablet Take 10 mg by mouth once daily.    apixaban (ELIQUIS) 5 mg Tab Take 1 tablet (5 mg total) by mouth 2 (two) times daily.    dofetilide (TIKOSYN) 250 MCG Cap Take 1 capsule (250 mcg total) by mouth every 12 (twelve) hours.    dofetilide (TIKOSYN) 250 MCG Cap Take 1 capsule (250 mcg total) by mouth every 12 (twelve) hours.    methIMAzole (TAPAZOLE) 5 MG Tab Take 5 mg by mouth once daily.     Family History    None       Tobacco Use    Smoking status: Never    Smokeless tobacco: Never   Substance and Sexual Activity    Alcohol use: Yes     Comment: occ    Drug use: Never    Sexual activity: Not on file     Review of Systems   Constitutional: Negative for fever and malaise/fatigue.   HENT:  Negative for congestion and sore throat.    Eyes:  Negative for blurred vision, vision loss in left eye and vision loss in right eye.   Cardiovascular:  Negative for chest pain, dyspnea on exertion, irregular heartbeat, leg swelling, near-syncope, palpitations and syncope.   Respiratory:  Negative for shortness of breath and wheezing.    Endocrine: Negative.    Hematologic/Lymphatic: Negative.    Skin: Negative.    Musculoskeletal:  Negative for arthritis, back pain, joint pain and myalgias.   Gastrointestinal:  Negative for abdominal pain, hematemesis, hematochezia and melena.   Genitourinary: Negative.    Neurological:  Negative for light-headedness and loss of balance.   Psychiatric/Behavioral: Negative.     Objective:     Vital Signs (Most Recent):    Vital Signs (24h Range):           There is no height or weight on file to calculate BMI.            No intake or output data in the 24 hours ending 02/16/23 1001    Lines/Drains/Airways       None                   Physical Exam  Vitals and nursing note reviewed.    Constitutional:       Appearance: Normal appearance. She is normal weight.   HENT:      Head: Atraumatic.      Mouth/Throat:      Mouth: Mucous membranes are moist.   Eyes:      General: No scleral icterus.     Extraocular Movements: Extraocular movements intact.   Neck:      Vascular: No carotid bruit.   Cardiovascular:      Rate and Rhythm: Normal rate and regular rhythm.      Pulses: Normal pulses.      Heart sounds: Normal heart sounds. No murmur heard.    No gallop.   Pulmonary:      Effort: Pulmonary effort is normal. No respiratory distress.      Breath sounds: Normal breath sounds. No wheezing.   Abdominal:      General: Abdomen is flat. Bowel sounds are normal.      Palpations: Abdomen is soft.   Musculoskeletal:         General: Normal range of motion.      Right lower leg: No edema.      Left lower leg: No edema.   Skin:     General: Skin is warm and dry.      Capillary Refill: Capillary refill takes less than 2 seconds.   Neurological:      General: No focal deficit present.      Mental Status: She is alert and oriented to person, place, and time. Mental status is at baseline.       Significant Labs: All pertinent lab results from the last 24 hours have been reviewed.    Significant Imaging: Echocardiogram: Transthoracic echo (TTE) complete (Cupid Only):   Results for orders placed or performed during the hospital encounter of 09/13/22   Echo   Result Value Ref Range    BSA 2.49 m2    IVC diameter 1.79 cm    Left Ventricular Outflow Tract Mean Velocity 0.98 cm/s    Left Ventricular Outflow Tract Mean Gradient 4.43 mmHg    PV PEAK VELOCITY 1.50 cm/s    LVIDd 5.92 3.5 - 6.0 cm    IVS 0.96 0.6 - 1.1 cm    Posterior Wall 1.14 (A) 0.6 - 1.1 cm    LVIDs 3.89 2.1 - 4.0 cm    FS 34 28 - 44 %    STJ 2.22 cm    Ascending aorta 3.50 cm    LV mass 257.17 g    LA size 4.63 cm    RVDD 3.60 cm    RV S' 13 cm/s    Left Ventricle Relative Wall Thickness 0.39 cm    AV mean gradient 13 mmHg    AV valve area 2.17 cm2     AV Velocity Ratio 0.60     AV index (prosthetic) 0.61     MV valve area p 1/2 method 3.45 cm2    E/A ratio 1.01     E wave deceleration time 219.86 msec    IVRT 108.47 msec    Pulm vein S/D ratio 0.82     LVOT diameter 2.13 cm    LVOT area 3.6 cm2    LVOT peak jose 1.53 m/s    LVOT peak VTI 36.70 cm    Ao peak jose 2.56 m/s    Ao VTI 60.1 cm    LVOT stroke volume 130.71 cm3    AV peak gradient 26 mmHg    MV Peak E Jose 0.84 m/s    TR Max Jose 2.59 m/s    MV stenosis pressure 1/2 time 63.76 ms    MV Peak A Jose 0.83 m/s    PV Peak S Jose 0.47 m/s    PV Peak D Jose 0.57 m/s    LV Systolic Volume 65.58 mL    LV Systolic Volume Index 28.3 mL/m2    LV Diastolic Volume 174.23 mL    LV Diastolic Volume Index 75.10 mL/m2    LV Mass Index 111 g/m2    RA Major Axis 5.19 cm    Left Atrium Minor Axis 7.02 cm    Left Atrium Major Axis 6.98 cm    Triscuspid Valve Regurgitation Peak Gradient 27 mmHg    LA Volume Index (Mod) 27.7 mL/m2    LA volume (mod) 64.18 cm3    TDI SEPTAL 0.08 m/s    LV LATERAL E/E' RATIO 8.40 m/s    LV SEPTAL E/E' RATIO 10.50 m/s    LA WIDTH 3.80 cm    Right Atrial Pressure (from IVC) 3 mmHg    TDI LATERAL 0.10 m/s    LA volume 104.68 cm3    Sinus 3.00 cm    TAPSE 2.90 cm    Mean e' 0.09 m/s    TV rest pulmonary artery pressure 30 mmHg    E/E' ratio 9.33 m/s    LA Volume Index 45.1 mL/m2    RA Width 4.00 cm    EF 62 %    Narrative    · The left ventricle is normal in size with concentric hypertrophy and   normal systolic function.  · Moderate left atrial enlargement.  · Normal central venous pressure (3 mmHg).  · The estimated PA systolic pressure is 30 mmHg.  · The estimated ejection fraction is 62%.  · Indeterminate left ventricular diastolic function.  · Normal right ventricular size with normal right ventricular systolic   function.        Assessment and Plan:     Persistent atrial fibrillation  Presents for ablation with Dr. Andrade    1. MIRIAM for evaluation of ELIZABETH thrombus  -No absolute contraindications of  esophageal stricture, tumor, perforation, laceration,or diverticulum and/or active GI bleed  -The risks, benefits & alternatives of the procedure were explained to the patient.   -The risks of transesophageal echo include but are not limited to:  Dental trauma, esophageal trauma/perforation, bleeding, laryngospasm/brochospasm, aspiration, sore throat/hoarseness, & dislodgement of the endotracheal tube/nasogastric tube (where applicable).    -The risks of moderate sedation include hypotension, respiratory depression, arrhythmias, bronchospasm, & death.    -Informed consent was obtained. The patient is agreeable to proceed with the procedure and all questions and concerns addressed.    Case discussed with an attending in echocardiography lab.     Further recommendations per attending addendum          VTE Risk Mitigation (From admission, onward)    None          Thank you for your consult. I will sign off. Please contact us if you have any additional questions.    Jose Alfredo Amaya MD  Cardiology   [facility] - Short Stay Cardiac Unit

## 2023-02-16 NOTE — H&P
Electrophysiology Pre Procedure H&P    HPI:   Prior Hx:  I had the pleasure of seeing Mrs. Junior in our electrophysiology clinic in follow-up for her arrhythmias. As you are aware she is a pleasant 62 year-old woman with hypertension, morbid obesity and bradycardia. She was referred to Dr. Koehler in 2021 for evaluation of bradycardia. She wore a holter monitor in July of 2021 which noted an average sinus rate of 41 bpm. There were periods during day and night time where her rate was in the 30s. She did have frequent PACs. She reports her heart rate has been like this for many years. She denies any fatigue, near syncope, or syncope. Since that visit she reported noting an episode of heart racing lasting 5 minutes in July of 2022. A 30 day event monitor was ordered which noted periods of bradycardia and an episode of long RP narrow complex tachycardia with a rate of 150 bpm (incorrectly read as atrial fibrillation by the monitoring company). This occurred at midnight on 9/9/2022. She was asleep. We discussed that should she begin to have frequent symptomatic episodes the treatment options would be EPS/ablation or attempt drug therapy which may require PPM implantation.     7/2021: holter noted sinus bradycardia with frequent PACs (8% burden). Average rate 41 bpm.     9/2022: ECHO noted preserved LV function.     I reviewed available electrocardiograms in Epic which show sinus bradycardia.       Mrs. Junior returns for follow-up. 30 day monitor noted a single episode of tachycardia with rate at 150 bpm. Initially suspected possible AFL with 2:1 AV conduction however re-evaluation noted a gradual slowing towards the end with the relationship being a long RP tachycardia. More likely this was an AT. Today she is in atrial fibrillation. Reports palpitations for the past few days.     My interpretation of today's in clinic ECG is atrial fibrillation with an average ventricular rate of 123 bpm.    Interval  "History:  Patient denies chest pain fevers chills. Last dose anticoag last night       Past Medical History:   Diagnosis Date    Atrial fibrillation     Hypertension     Hyperthyroidism         Social History     Socioeconomic History    Marital status: Single   Tobacco Use    Smoking status: Never    Smokeless tobacco: Never   Substance and Sexual Activity    Alcohol use: Yes     Comment: occ    Drug use: Never        History reviewed. No pertinent family history.     No current facility-administered medications for this encounter.      ROS:  Constitutional: (-)fevers, (-)chills, (-)night sweats  HEENT: (-) headaches (-) lightheadedness (-) blurry vision  Cardiovascular: (-)chest pain (-)paroxysmal nocturnal dyspnea (-)orthopnea  Respiratory: (-)shortness of breath, (-)dyspnea on exertion (-)hemoptysis  Gastrointestinal: (-)abdominal pain (-)nausea (-)vomiting (-)tarry stools  Musculoskeletal: (-)arthralgias (-)limited range of motion  Neurologic: (-)parasthesias (-)mood disorder (-)anxiety  Endo: (-)polyuria (-)polydipsia (-)heat/cold intolerance  Skin: (-)rash     Vitals:    02/16/23 0900 02/16/23 1005 02/16/23 1006   BP:  138/81 (!) 151/89   BP Location:  Right arm Left arm   Patient Position:  Lying Lying   Pulse:  84    Resp:  18    Temp:  97 °F (36.1 °C)    TempSrc:  Temporal    SpO2:  97%    Weight: 133.8 kg (295 lb)     Height: 5' 4" (1.626 m)       Physical Exam:   Gen: no acute distress, pleasant patient answering questions appropriately  HEENT: extra-ocular muscles intact, normocephalic-atraumatic  Neck: no jugular venous distension, no lymphadenopathy, no thyromegaly, no carotid bruits  CVS: irregular rate and rhythm, S1S2 normal, no murmurs/rubs/gallops  CHEST: clear to auscultation bilaterally, no wheezes/rales/ronchi  ABD: Soft, non-tender, nondistended, bowel sounds present  EXT: No Edema, 2+ pulses bilaterally (radial, femoral, dorsales pedis, posterior tibial)  NEURO: awake, alert, and oriented "   Skin: No rash     Review of Labs:   At baseline  Most recent ECG  AF     Assessment/Plan:  Roxane Junior is a 62 y.o. female with AF. Plan PVI  -PVI

## 2023-02-16 NOTE — LETTER
February 17, 2023         [address]  [facility] [address]  Phone: [phone]  Fax: [phone]       Patient: Roxane Junior   YOB: 1960  Date of Visit: 02/17/2023    To Whom It May Concern:    Barron Junior  was at [facility] on 02/17/2023.    If you have any questions or concerns, or if I can be of further assistance, please do not hesitate to contact me.    Sincerely,        Harjit Patel MD

## 2023-02-16 NOTE — HPI
Ms. Junior is a 62 year old W w/ PMHx with hypertension, morbid obesity, asymptomatic sinus bradycardia, and Afib who presents for ablation with Dr. Andrade.     She was admitted to [facility] after scheduled MIRIAM/DCCV due to AF with plan for Tikosyn post procedure with Dr. Andrade. Denied any chest pain, SOB, RIZVI, dizziness, light headedness, weakness, LE swelling, syncope, or near syncopal episodes. NPO since yesterday.      The left ventricle is normal in size with Moderately decreased systolic function.The estimated ejection fraction is 40% (at a heart rate of 150bpm)  Normal right ventricular size with mildly to moderately reduced right ventricular systolic function.  No interatrial septal defect present.  Normal appearing left atrial appendage. No thrombus is present in the appendage.  Grade 2 plaque present.  The estimated ejection fraction is 40%.  Mild mitral regurgitation.  Left ventricular diastolic dysfunction.

## 2023-02-16 NOTE — PLAN OF CARE
Pt arrived to unit accompanied by her friend.  Pre op orders and assessment initiated.  Pt remains npo.  Call bell within reach.

## 2023-02-16 NOTE — SUBJECTIVE & OBJECTIVE
Past Medical History:   Diagnosis Date    Atrial fibrillation     Hypertension     Hyperthyroidism        Past Surgical History:   Procedure Laterality Date    TREATMENT OF CARDIAC ARRHYTHMIA N/A 12/6/2022    Procedure: CARDIOVERSION;  Surgeon: Prabhu Andrade MD;  Location: [facility];  Service: Cardiology;  Laterality: N/A;  afib, MIRIAM( Cx if pt in SR)  DCCV, anes, MD, 3 Prep *Tikosyn Admit post procedure*    TUBAL LIGATION         Review of patient's allergies indicates:  No Known Allergies    No current facility-administered medications on file prior to encounter.     Current Outpatient Medications on File Prior to Encounter   Medication Sig    amLODIPine (NORVASC) 10 MG tablet Take 10 mg by mouth once daily.    apixaban (ELIQUIS) 5 mg Tab Take 1 tablet (5 mg total) by mouth 2 (two) times daily.    dofetilide (TIKOSYN) 250 MCG Cap Take 1 capsule (250 mcg total) by mouth every 12 (twelve) hours.    dofetilide (TIKOSYN) 250 MCG Cap Take 1 capsule (250 mcg total) by mouth every 12 (twelve) hours.    methIMAzole (TAPAZOLE) 5 MG Tab Take 5 mg by mouth once daily.     Family History    None       Tobacco Use    Smoking status: Never    Smokeless tobacco: Never   Substance and Sexual Activity    Alcohol use: Yes     Comment: occ    Drug use: Never    Sexual activity: Not on file     Review of Systems   Constitutional: Negative for fever and malaise/fatigue.   HENT:  Negative for congestion and sore throat.    Eyes:  Negative for blurred vision, vision loss in left eye and vision loss in right eye.   Cardiovascular:  Negative for chest pain, dyspnea on exertion, irregular heartbeat, leg swelling, near-syncope, palpitations and syncope.   Respiratory:  Negative for shortness of breath and wheezing.    Endocrine: Negative.    Hematologic/Lymphatic: Negative.    Skin: Negative.    Musculoskeletal:  Negative for arthritis, back pain, joint pain and myalgias.   Gastrointestinal:  Negative for abdominal pain, hematemesis,  hematochezia and melena.   Genitourinary: Negative.    Neurological:  Negative for light-headedness and loss of balance.   Psychiatric/Behavioral: Negative.     Objective:     Vital Signs (Most Recent):    Vital Signs (24h Range):           There is no height or weight on file to calculate BMI.            No intake or output data in the 24 hours ending 02/16/23 1001    Lines/Drains/Airways       None                   Physical Exam  Vitals and nursing note reviewed.   Constitutional:       Appearance: Normal appearance. She is normal weight.   HENT:      Head: Atraumatic.      Mouth/Throat:      Mouth: Mucous membranes are moist.   Eyes:      General: No scleral icterus.     Extraocular Movements: Extraocular movements intact.   Neck:      Vascular: No carotid bruit.   Cardiovascular:      Rate and Rhythm: Normal rate and regular rhythm.      Pulses: Normal pulses.      Heart sounds: Normal heart sounds. No murmur heard.    No gallop.   Pulmonary:      Effort: Pulmonary effort is normal. No respiratory distress.      Breath sounds: Normal breath sounds. No wheezing.   Abdominal:      General: Abdomen is flat. Bowel sounds are normal.      Palpations: Abdomen is soft.   Musculoskeletal:         General: Normal range of motion.      Right lower leg: No edema.      Left lower leg: No edema.   Skin:     General: Skin is warm and dry.      Capillary Refill: Capillary refill takes less than 2 seconds.   Neurological:      General: No focal deficit present.      Mental Status: She is alert and oriented to person, place, and time. Mental status is at baseline.       Significant Labs: All pertinent lab results from the last 24 hours have been reviewed.    Significant Imaging: Echocardiogram: Transthoracic echo (TTE) complete (Cupid Only):   Results for orders placed or performed during the hospital encounter of 09/13/22   Echo   Result Value Ref Range    BSA 2.49 m2    IVC diameter 1.79 cm    Left Ventricular Outflow Tract  Mean Velocity 0.98 cm/s    Left Ventricular Outflow Tract Mean Gradient 4.43 mmHg    PV PEAK VELOCITY 1.50 cm/s    LVIDd 5.92 3.5 - 6.0 cm    IVS 0.96 0.6 - 1.1 cm    Posterior Wall 1.14 (A) 0.6 - 1.1 cm    LVIDs 3.89 2.1 - 4.0 cm    FS 34 28 - 44 %    STJ 2.22 cm    Ascending aorta 3.50 cm    LV mass 257.17 g    LA size 4.63 cm    RVDD 3.60 cm    RV S' 13 cm/s    Left Ventricle Relative Wall Thickness 0.39 cm    AV mean gradient 13 mmHg    AV valve area 2.17 cm2    AV Velocity Ratio 0.60     AV index (prosthetic) 0.61     MV valve area p 1/2 method 3.45 cm2    E/A ratio 1.01     E wave deceleration time 219.86 msec    IVRT 108.47 msec    Pulm vein S/D ratio 0.82     LVOT diameter 2.13 cm    LVOT area 3.6 cm2    LVOT peak jose 1.53 m/s    LVOT peak VTI 36.70 cm    Ao peak jose 2.56 m/s    Ao VTI 60.1 cm    LVOT stroke volume 130.71 cm3    AV peak gradient 26 mmHg    MV Peak E Jose 0.84 m/s    TR Max Jose 2.59 m/s    MV stenosis pressure 1/2 time 63.76 ms    MV Peak A Jose 0.83 m/s    PV Peak S Jose 0.47 m/s    PV Peak D Jose 0.57 m/s    LV Systolic Volume 65.58 mL    LV Systolic Volume Index 28.3 mL/m2    LV Diastolic Volume 174.23 mL    LV Diastolic Volume Index 75.10 mL/m2    LV Mass Index 111 g/m2    RA Major Axis 5.19 cm    Left Atrium Minor Axis 7.02 cm    Left Atrium Major Axis 6.98 cm    Triscuspid Valve Regurgitation Peak Gradient 27 mmHg    LA Volume Index (Mod) 27.7 mL/m2    LA volume (mod) 64.18 cm3    TDI SEPTAL 0.08 m/s    LV LATERAL E/E' RATIO 8.40 m/s    LV SEPTAL E/E' RATIO 10.50 m/s    LA WIDTH 3.80 cm    Right Atrial Pressure (from IVC) 3 mmHg    TDI LATERAL 0.10 m/s    LA volume 104.68 cm3    Sinus 3.00 cm    TAPSE 2.90 cm    Mean e' 0.09 m/s    TV rest pulmonary artery pressure 30 mmHg    E/E' ratio 9.33 m/s    LA Volume Index 45.1 mL/m2    RA Width 4.00 cm    EF 62 %    Narrative    · The left ventricle is normal in size with concentric hypertrophy and   normal systolic function.  · Moderate left  atrial enlargement.  · Normal central venous pressure (3 mmHg).  · The estimated PA systolic pressure is 30 mmHg.  · The estimated ejection fraction is 62%.  · Indeterminate left ventricular diastolic function.  · Normal right ventricular size with normal right ventricular systolic   function.

## 2023-02-16 NOTE — BRIEF OP NOTE
: Prabhu Andrade MD    Post-operative Diagnosis: AF    Procedure Performed: Pulmonary vein isolation of all 4 pulmonary veins via radiofrequency ablation.     Description of Procedure: The patient was brought to the EP lab in the fasting state. Prepped and draped in sterile fashion. Safety timeout was performed. Sedation administered by anesthesia staff. Ultrasound guided venous access of the bilateral femoral veins was performed. ICE and CS catheters placed via left femoral vein access. Long sheaths via right femoral venous access. Heparin bolus and continuous infusion per protocol. Two transseptal punctures performed using combination of fluoroscopic and ICE guidance. Map created. RFA to isolate all pulmonary veins. ICE confirmed no significant pericardial effusion.     EBL: <10 mL    Specimens Removed: None  Complications: no immediate      A/P  S/p PVI  Resume eliquis this evening  Resume lianne Duenas MD PGY-8

## 2023-02-17 VITALS
DIASTOLIC BLOOD PRESSURE: 67 MMHG | RESPIRATION RATE: 22 BRPM | HEIGHT: 64 IN | TEMPERATURE: 98 F | BODY MASS INDEX: 50.02 KG/M2 | WEIGHT: 293 LBS | SYSTOLIC BLOOD PRESSURE: 154 MMHG | HEART RATE: 54 BPM | OXYGEN SATURATION: 96 %

## 2023-02-17 PROCEDURE — 25000003 PHARM REV CODE 250: Performed by: INTERNAL MEDICINE

## 2023-02-17 PROCEDURE — 94761 N-INVAS EAR/PLS OXIMETRY MLT: CPT

## 2023-02-17 RX ADMIN — APIXABAN 5 MG: 5 TABLET, FILM COATED ORAL at 08:02

## 2023-02-17 RX ADMIN — DOFETILIDE 250 MCG: 0.25 CAPSULE ORAL at 08:02

## 2023-02-17 RX ADMIN — PANTOPRAZOLE SODIUM 40 MG: 40 TABLET, DELAYED RELEASE ORAL at 08:02

## 2023-02-17 NOTE — PROGRESS NOTES
Eleazar Louis called after pt's EKG done and showed junctional rhythm. New EKG ordered. Pt stable. MD at bedside. MONICA.

## 2023-02-17 NOTE — DISCHARGE INSTRUCTIONS
If you begin to have sharp chest pain can take OTC Ibuprofen 600mg 3 times daily for 3 days  Do not strain or lift anything greater than 5 lb for 1 week.  Do not drive or operate any dangerous machinery for 24 hours.   Clean the area with mild soap and water and then cover it with a bandage.   Once the skin has healed, bathing in a tub or swimming is allowed.   Inspect the groin site daily and report to the physician any swelling at the site that   cannot be controlled with manual pressure for 10 minutes, unusual pain at the   access site or affected extremity, unusual swelling at the access site, or signs or   symptoms of infection such as redness, pain, or fever.   Call 911 if you have:   Bleeding from the puncture site that you cannot stop by doing the following:   Relax and lie down right away. Keep your leg flat and apply firm pressure to the site using your fingers and a gauze pad. Keep the pressure on for 20 minutes. Continue this until the bleeding stops. This may take awhile. When bleeding stops, cover the site with a sterile bandage and keep your leg still as much as possible.

## 2023-02-17 NOTE — TRANSFER OF CARE
"Anesthesia Transfer of Care Note    Patient: Roxane Junior    Procedure(s) Performed: Procedure(s) (LRB):  Ablation atrial fibrillation (N/A)  Transesophageal echo (MIRIAM) intra-procedure log documentation (N/A)  Cardioversion or Defibrillation    Patient location: PACU    Anesthesia Type: general    Transport from OR: Transported from OR on 6-10 L/min O2 by face mask with adequate spontaneous ventilation    Post pain: adequate analgesia    Post assessment: no apparent anesthetic complications and tolerated procedure well    Post vital signs: stable    Level of consciousness: awake and alert    Nausea/Vomiting: no nausea/vomiting    Complications: none    Transfer of care protocol was followed      Last vitals:   Visit Vitals  BP (!) 151/89 (BP Location: Left arm, Patient Position: Lying)   Pulse 84   Temp 36.1 °C (97 °F) (Temporal)   Resp 18   Ht 5' 4" (1.626 m)   Wt 133.8 kg (295 lb)   SpO2 97%   Breastfeeding No   BMI 50.64 kg/m²     "

## 2023-02-17 NOTE — PLAN OF CARE
Pt AAO, VSS. Upon discharge, all PIV's and telemetry were removed and AVS reviewed with pt. All questions were answered. Pt to be wheeled off the unit via [facility].

## 2023-02-17 NOTE — DISCHARGE SUMMARY
[facility] - Cardiology Stepdown  Cardiology  Discharge Summary      Patient Name: Roxane Junior  MRN: [id number]  Admission Date: 2/16/2023  Hospital Length of Stay: 0 days  Discharge Date and Time:  02/17/2023 2:14 PM  Attending Physician: No att. providers found  Discharging Provider: Azam Duenas MD  Primary Care Physician: Dinesh Pacheco MD    HPI: Prior Hx:  I had the pleasure of seeing Mrs. Junior in our electrophysiology clinic in follow-up for her arrhythmias. As you are aware she is a pleasant 62 year-old woman with hypertension, morbid obesity and bradycardia. She was referred to Dr. Koehler in 2021 for evaluation of bradycardia. She wore a holter monitor in July of 2021 which noted an average sinus rate of 41 bpm. There were periods during day and night time where her rate was in the 30s. She did have frequent PACs. She reports her heart rate has been like this for many years. She denies any fatigue, near syncope, or syncope. Since that visit she reported noting an episode of heart racing lasting 5 minutes in July of 2022. A 30 day event monitor was ordered which noted periods of bradycardia and an episode of long RP narrow complex tachycardia with a rate of 150 bpm (incorrectly read as atrial fibrillation by the monitoring company). This occurred at midnight on 9/9/2022. She was asleep. We discussed that should she begin to have frequent symptomatic episodes the treatment options would be EPS/ablation or attempt drug therapy which may require PPM implantation.     7/2021: holter noted sinus bradycardia with frequent PACs (8% burden). Average rate 41 bpm.     9/2022: ECHO noted preserved LV function.     I reviewed available electrocardiograms in Epic which show sinus bradycardia.        Mrs. Junior returns for follow-up. 30 day monitor noted a single episode of tachycardia with rate at 150 bpm. Initially suspected possible AFL with 2:1 AV conduction however re-evaluation noted a gradual slowing  towards the end with the relationship being a long RP tachycardia. More likely this was an AT. Today she is in atrial fibrillation. Reports palpitations for the past few days.     My interpretation of today's in clinic ECG is atrial fibrillation with an average ventricular rate of 123 bpm.     Interval History:  Patient denies chest pain fevers chills. Last dose anticoag last night    Procedure(s) (LRB):  Ablation atrial fibrillation (N/A)  Transesophageal echo (MIRIAM) intra-procedure log documentation (N/A)  Cardioversion or Defibrillation     Indwelling Lines/Drains at time of discharge:  Lines/Drains/Airways       None                   Hospital Course (synopsis of major diagnoses, care, treatment, and services provided during the course of the hospital stay): Patient arrived fasting for PVI. Patient tolerated procedure well. Monitored patient post procedure and discharged.       Pending Diagnostic Studies:       None            Final Active Diagnoses:    Diagnosis Date Noted POA    Persistent atrial fibrillation [I48.19] 11/22/2022 Yes      Problems Resolved During this Admission:       Discharged Condition: good    Follow Up:   Follow-up Information       Prabhu Andrade MD Follow up in 3 month(s).    Specialties: Electrophysiology, Cardiology  [facility] information:  [facility] [address]  [phone]                           Patient Instructions:   Do not strain or lift anything greater than 5 lb for 1 week.  Do not drive or operate any dangerous machinery for 24 hours.   Clean the area with mild soap and water and then cover it with a bandage.   Once the skin has healed, bathing in a tub or swimming is allowed.   Inspect the groin site daily and report to the physician any swelling at the site that   cannot be controlled with manual pressure for 10 minutes, unusual pain at the   access site or affected extremity, unusual swelling at the access site, or signs or   symptoms of infection such as  redness, pain, or fever.   Call 911 if you have:   Bleeding from the puncture site that you cannot stop by doing the following:   Relax and lie down right away. Keep your leg flat and apply firm pressure to the site using your fingers and a gauze pad. Keep the pressure on for 20 minutes. Continue this until the bleeding stops. This may take awhile. When bleeding stops, cover the site with a sterile bandage and keep your leg still as much as possible.     Medications:  Reconciled Home Medications:      Medication List        CHANGE how you take these medications      dofetilide 250 MCG Cap  Commonly known as: TIKOSYN  Take 1 capsule (250 mcg total) by mouth every 12 (twelve) hours.  What changed: Another medication with the same name was removed. Continue taking this medication, and follow the directions you see here.            CONTINUE taking these medications      amLODIPine 10 MG tablet  Commonly known as: NORVASC  Take 10 mg by mouth once daily.     apixaban 5 mg Tab  Commonly known as: ELIQUIS  Take 1 tablet (5 mg total) by mouth 2 (two) times daily.     methIMAzole 5 MG Tab  Commonly known as: TAPAZOLE  Take 5 mg by mouth once daily.     pantoprazole 40 MG tablet  Commonly known as: PROTONIX  Take 1 tablet (40 mg total) by mouth once daily. Start taking this medication on 2/2/2023.              Time spent on the discharge of patient: 30 minutes    Azam Duenas MD  Cardiology  [facility] - Cardiology Stepdown

## 2023-02-17 NOTE — NURSING
Sutures from bilateral groin sites removed. Patient up at 30 degrees. Groin sites remains soft and non tender with no hematoma formation

## 2023-02-20 LAB
POC ACTIVATED CLOTTING TIME K: 149 SEC (ref 74–137)
POC ACTIVATED CLOTTING TIME K: 155 SEC (ref 74–137)
POC ACTIVATED CLOTTING TIME K: 317 SEC (ref 74–137)
POC ACTIVATED CLOTTING TIME K: 341 SEC (ref 74–137)
POC ACTIVATED CLOTTING TIME K: 353 SEC (ref 74–137)
POC ACTIVATED CLOTTING TIME K: 371 SEC (ref 74–137)
SAMPLE: ABNORMAL

## 2023-02-22 NOTE — ANESTHESIA POSTPROCEDURE EVALUATION
Anesthesia Post Evaluation    Patient: Roxane Junior    Procedure(s) Performed: Procedure(s) (LRB):  Ablation atrial fibrillation (N/A)  Transesophageal echo (MIRIAM) intra-procedure log documentation (N/A)  Cardioversion or Defibrillation    Final Anesthesia Type: general      Patient location during evaluation: PACU  Patient participation: Yes- Able to Participate  Level of consciousness: awake and alert  Post-procedure vital signs: reviewed and stable  Pain management: adequate  Airway patency: patent    PONV status at discharge: No PONV  Anesthetic complications: no      Cardiovascular status: blood pressure returned to baseline and hemodynamically stable  Respiratory status: unassisted and spontaneous ventilation  Hydration status: euvolemic  Follow-up not needed.          Vitals Value Taken Time   /67 02/17/23 0730   Temp 36.6 °C (97.9 °F) 02/17/23 0730   Pulse 54 02/17/23 1000   Resp 22 02/17/23 0730   SpO2 96 % 02/17/23 0730         Event Time   Out of Recovery 20:45:00         Pain/Chris Score: No data recorded

## 2023-03-13 ENCOUNTER — TELEPHONE (OUTPATIENT)
Dept: ELECTROPHYSIOLOGY | Facility: CLINIC | Age: 63
End: 2023-03-13
Payer: COMMERCIAL

## 2023-03-15 ENCOUNTER — OFFICE VISIT (OUTPATIENT)
Dept: CARDIOLOGY | Facility: CLINIC | Age: 63
End: 2023-03-15
Payer: COMMERCIAL

## 2023-03-15 VITALS
BODY MASS INDEX: 50.02 KG/M2 | HEART RATE: 43 BPM | HEIGHT: 64 IN | OXYGEN SATURATION: 99 % | WEIGHT: 293 LBS | SYSTOLIC BLOOD PRESSURE: 136 MMHG | DIASTOLIC BLOOD PRESSURE: 86 MMHG

## 2023-03-15 DIAGNOSIS — I10 ESSENTIAL HYPERTENSION: Primary | ICD-10-CM

## 2023-03-15 DIAGNOSIS — E66.01 MORBID OBESITY: ICD-10-CM

## 2023-03-15 DIAGNOSIS — I48.19 PERSISTENT ATRIAL FIBRILLATION: ICD-10-CM

## 2023-03-15 DIAGNOSIS — R00.1 BRADYCARDIA ON ECG: ICD-10-CM

## 2023-03-15 PROCEDURE — 99999 PR PBB SHADOW E&M-EST. PATIENT-LVL III: CPT | Mod: PBBFAC,,, | Performed by: INTERNAL MEDICINE

## 2023-03-15 PROCEDURE — 3008F PR BODY MASS INDEX (BMI) DOCUMENTED: ICD-10-PCS | Mod: CPTII,S$GLB,, | Performed by: INTERNAL MEDICINE

## 2023-03-15 PROCEDURE — 3008F BODY MASS INDEX DOCD: CPT | Mod: CPTII,S$GLB,, | Performed by: INTERNAL MEDICINE

## 2023-03-15 PROCEDURE — 99999 PR PBB SHADOW E&M-EST. PATIENT-LVL III: ICD-10-PCS | Mod: PBBFAC,,, | Performed by: INTERNAL MEDICINE

## 2023-03-15 PROCEDURE — 93000 EKG 12-LEAD: ICD-10-PCS | Mod: S$GLB,,, | Performed by: INTERNAL MEDICINE

## 2023-03-15 PROCEDURE — 3079F DIAST BP 80-89 MM HG: CPT | Mod: CPTII,S$GLB,, | Performed by: INTERNAL MEDICINE

## 2023-03-15 PROCEDURE — 1159F PR MEDICATION LIST DOCUMENTED IN MEDICAL RECORD: ICD-10-PCS | Mod: CPTII,S$GLB,, | Performed by: INTERNAL MEDICINE

## 2023-03-15 PROCEDURE — 3075F SYST BP GE 130 - 139MM HG: CPT | Mod: CPTII,S$GLB,, | Performed by: INTERNAL MEDICINE

## 2023-03-15 PROCEDURE — 99214 PR OFFICE/OUTPT VISIT, EST, LEVL IV, 30-39 MIN: ICD-10-PCS | Mod: S$GLB,,, | Performed by: INTERNAL MEDICINE

## 2023-03-15 PROCEDURE — 99214 OFFICE O/P EST MOD 30 MIN: CPT | Mod: S$GLB,,, | Performed by: INTERNAL MEDICINE

## 2023-03-15 PROCEDURE — 93000 ELECTROCARDIOGRAM COMPLETE: CPT | Mod: S$GLB,,, | Performed by: INTERNAL MEDICINE

## 2023-03-15 PROCEDURE — 3075F PR MOST RECENT SYSTOLIC BLOOD PRESS GE 130-139MM HG: ICD-10-PCS | Mod: CPTII,S$GLB,, | Performed by: INTERNAL MEDICINE

## 2023-03-15 PROCEDURE — 3079F PR MOST RECENT DIASTOLIC BLOOD PRESSURE 80-89 MM HG: ICD-10-PCS | Mod: CPTII,S$GLB,, | Performed by: INTERNAL MEDICINE

## 2023-03-15 PROCEDURE — 1159F MED LIST DOCD IN RCRD: CPT | Mod: CPTII,S$GLB,, | Performed by: INTERNAL MEDICINE

## 2023-03-15 NOTE — PROGRESS NOTES
Cardiology    3/15/2023  3:04 PM    Problem list  Patient Active Problem List   Diagnosis    Right knee injury    Bradycardia on ECG    Essential hypertension    Hyperthyroidism    BMI 50.0-59.9, adult    Morbid obesity    Atrial tachycardia    Persistent atrial fibrillation    Chronic sinusitis    Heart failure with preserved ejection fraction    Abnormal heart rate    Visit for monitoring Tikosyn therapy       CC:  F/u    HPI:  She is doing well.  She has no complaints.  She denies any chest pain, shortness of breath, palpitation, bleeding.  She underwent ablation for atrial fibrillation successfully last month.    Medications  Current Outpatient Medications   Medication Sig Dispense Refill    amLODIPine (NORVASC) 10 MG tablet Take 10 mg by mouth once daily.      apixaban (ELIQUIS) 5 mg Tab Take 1 tablet (5 mg total) by mouth 2 (two) times daily. 60 tablet 11    dofetilide (TIKOSYN) 250 MCG Cap Take 1 capsule (250 mcg total) by mouth every 12 (twelve) hours. 60 capsule 11    methIMAzole (TAPAZOLE) 5 MG Tab Take 5 mg by mouth once daily.      pantoprazole (PROTONIX) 40 MG tablet Take 1 tablet (40 mg total) by mouth once daily. Start taking this medication on 2/2/2023. 90 tablet 0     No current facility-administered medications for this visit.      Prior to Admission medications    Medication Sig Start Date End Date Taking? Authorizing Provider   amLODIPine (NORVASC) 10 MG tablet Take 10 mg by mouth once daily.   Yes Historical Provider   apixaban (ELIQUIS) 5 mg Tab Take 1 tablet (5 mg total) by mouth 2 (two) times daily. 11/22/22  Yes Prabhu Andrade MD   dofetilide (TIKOSYN) 250 MCG Cap Take 1 capsule (250 mcg total) by mouth every 12 (twelve) hours. 12/8/22 12/8/23 Yes Shelly Nathan MD   methIMAzole (TAPAZOLE) 5 MG Tab Take 5 mg by mouth once daily.   Yes Historical Provider   pantoprazole (PROTONIX) 40 MG tablet Take 1 tablet (40 mg total) by mouth once daily. Start taking this medication on 2/2/2023.  1/25/23  Yes Prabhu Andrade MD         History  Past Medical History:   Diagnosis Date    Atrial fibrillation     Hypertension     Hyperthyroidism      Past Surgical History:   Procedure Laterality Date    ABLATION OF ARRHYTHMOGENIC FOCUS FOR ATRIAL FIBRILLATION N/A 2/16/2023    Procedure: Ablation atrial fibrillation;  Surgeon: Prabhu Andrade MD;  Location: [facility];  Service: Cardiology;  Laterality: N/A;  AF, MIRIAM (Cx if SR), PVI, RFA, [place], Gen, [place], 3 Prep    ECHOCARDIOGRAM,TRANSESOPHAGEAL N/A 2/16/2023    Procedure: Transesophageal echo (MIRIAM) intra-procedure log documentation;  Surgeon: Kunal Jacobsen MD;  Location: [facility];  Service: Cardiology;  Laterality: N/A;    TREATMENT OF CARDIAC ARRHYTHMIA N/A 12/6/2022    Procedure: CARDIOVERSION;  Surgeon: Prabhu Andrade MD;  Location: [facility];  Service: Cardiology;  Laterality: N/A;  afib, MIRIAM( Cx if pt in SR)  DCCV, anes, [place], 3 Prep *Tikosyn Admit post procedure*    TREATMENT OF CARDIAC ARRHYTHMIA  2/16/2023    Procedure: Cardioversion or Defibrillation;  Surgeon: Prabhu Andrade MD;  Location: [facility];  Service: Cardiology;;    TUBAL LIGATION       Social History     Socioeconomic History    Marital status: Single   Tobacco Use    Smoking status: Never    Smokeless tobacco: Never   Substance and Sexual Activity    Alcohol use: Yes     Comment: occ    Drug use: Never         Allergies  Review of patient's allergies indicates:  No Known Allergies      Review of Systems   Review of Systems   Constitutional: Negative for decreased appetite, fever and weight loss.   HENT:  Negative for congestion and nosebleeds.    Eyes:  Negative for double vision, vision loss in left eye, vision loss in right eye and visual disturbance.   Cardiovascular:  Negative for chest pain, claudication, cyanosis, dyspnea on exertion, irregular heartbeat, leg swelling, near-syncope, orthopnea, palpitations, paroxysmal nocturnal dyspnea and syncope.   Respiratory:  Negative  for cough, hemoptysis, shortness of breath, sleep disturbances due to breathing, snoring, sputum production and wheezing.    Endocrine: Negative for cold intolerance and heat intolerance.   Skin:  Negative for nail changes and rash.   Musculoskeletal:  Negative for joint pain, muscle cramps, muscle weakness and myalgias.   Gastrointestinal:  Negative for change in bowel habit, heartburn, hematemesis, hematochezia, hemorrhoids and melena.   Neurological:  Negative for dizziness, focal weakness and headaches.       Physical Exam  Wt Readings from Last 1 Encounters:   03/15/23 (!) 141 kg (310 lb 13.6 oz)     BP Readings from Last 3 Encounters:   03/15/23 136/86   02/17/23 (!) 154/67   12/20/22 112/64     Pulse Readings from Last 1 Encounters:   03/15/23 (!) 43     Body mass index is 53.36 kg/m².    Physical Exam  Vitals reviewed.   Constitutional:       Appearance: She is well-developed. She is obese.   HENT:      Head: Atraumatic.   Eyes:      General: No scleral icterus.  Neck:      Vascular: Normal carotid pulses. No carotid bruit, hepatojugular reflux or JVD.   Cardiovascular:      Rate and Rhythm: Regular rhythm. Bradycardia present.      Chest Wall: PMI is not displaced.      Pulses: Intact distal pulses.           Carotid pulses are 2+ on the right side and 2+ on the left side.       Radial pulses are 2+ on the right side and 2+ on the left side.        Dorsalis pedis pulses are 2+ on the right side and 2+ on the left side.      Heart sounds: S1 normal and S2 normal. Murmur heard.   Early systolic murmur is present with a grade of 2/6 at the upper right sternal border.     No friction rub.   Pulmonary:      Effort: Pulmonary effort is normal. No respiratory distress.      Breath sounds: Normal breath sounds. No stridor. No wheezing or rales.   Chest:      Chest wall: No tenderness.   Abdominal:      General: Bowel sounds are normal.      Palpations: Abdomen is soft.   Musculoskeletal:      Cervical back: Neck  supple. No edema.      Right lower leg: No edema.      Left lower leg: No edema.   Skin:     General: Skin is warm and dry.      Nails: There is no clubbing.   Neurological:      Mental Status: She is alert and oriented to person, place, and time.   Psychiatric:         Behavior: Behavior normal.         Thought Content: Thought content normal.           Assessment  1. Essential hypertension  Well controlled on current medications, continue to monitor    2. Morbid obesity  Unchanged    3. Bradycardia on ECG  Stable    4. Persistent atrial fibrillation  Status post ablation.  In sinus rhythm.  On Eliquis        Plan and Discussion  Discussed her EKG today shows sinus rhythm rate of 43.  She is asymptomatic.  She has follow-up with electrophysiologist in May.    Follow Up  4 months      Montana Koehler MD, F.A.C.C, F.S.C.A.I.        30 minutes were spent in chart review, documentation and review of results, and evaluation, treatment, and counseling of patient on the same day of service.    Disclaimer: This document was created using voice recognition software ([employer]). Although it may be edited, this document may contain errors related to incorrect recognition of the spoken word. Please call the physician if clarification is needed.

## 2023-05-15 NOTE — PROGRESS NOTES
Ms. Junior is a patient of Dr. Andrade and was last seen in clinic 11/22/2022.      Subjective:   Patient ID:  Roxane Junior is a 63 y.o. female who presents for follow up of atrial tachycardia, Atrial Fibrillation, and Bradycardia  .     HPI:    Ms. Junior is a 63 y.o. female with AF, bradycardia, PACs, hyperthyroid here for follow up after ablation.     Background:    Referring Cardiologist: Montana Koehler MD  Primary Care Physician: Dinesh Pacheco MD    Mrs. uJnior has a history of hypertension, morbid obesity and bradycardia. She was referred to Dr. Koehler in 2021 for evaluation of bradycardia. She wore a holter monitor in July of 2021 which noted an average sinus rate of 41 bpm. There were periods during day and night time where her rate was in the 30s. She did have frequent PACs. She reports her heart rate has been like this for many years. She denies any fatigue, near syncope, or syncope. Since that visit she reported noting an episode of heart racing lasting 5 minutes in July of 2022. A 30 day event monitor was ordered which noted periods of bradycardia and an episode of long RP narrow complex tachycardia with a rate of 150 bpm (incorrectly read as atrial fibrillation by the monitoring company). This occurred at midnight on 9/9/2022. She was asleep. We discussed that should she begin to have frequent symptomatic episodes the treatment options would be EPS/ablation or attempt drug therapy which may require PPM implantation.    7/2021: Holter noted sinus bradycardia with frequent PACs (8% burden). Average rate 41 bpm.    9/2022: ECHO noted preserved LV function.    Available electrocardiograms in Epic which show sinus bradycardia.    Mrs. Junior returns for follow-up 11/22/2022. 30 day monitor noted a single episode of tachycardia with rate at 150 bpm. Initially suspected possible AFL with 2:1 AV conduction however re-evaluation noted a gradual slowing towards the end with the relationship being a  long RP tachycardia. More likely this was an AT. Today she is in atrial fibrillation. Reports palpitations for the past few days.  ECG is atrial fibrillation with an average ventricular rate of 123 bpm.  Her [id number] score is 2 and anticoagulation is currently recommended. She will start eliquis. I also discussed the goal to reduce symptomatic arrhythmic episodes by pharmacologic and/or procedural methods and utilizing a rhythm versus a rate control strategy. Due to symptoms, RVR, and young age I recommend rhythm control. Discussed the problematic nature of symptomatic AF with RVR and asymptomatic sinus bradycardia. Prefer to avoid PPM just for anti-arrhythmic drug therapy. Discussed dofetilide versus PVI. She is open to try dofetilide. If she is intolerant to this or it doesn't work then would advocate for PVI.    Plan: Admit for dofetilide initiation. MIRIAM/DCCV if she presents in AF. Start eliquis 5mg bid.    Update (05/16/2023):    12/9/2022: DCCV +tikosyn. Had recurrence    2/16/2023: Successful pulmonary vein RF ablation.    Today she says she has been feeling well since ablation. No rapid heart rates noted. No palpitations. No CP, RIZVI. No LH or syncope. No significant fatigue reported.  She says she has noticed worsening leg edema since stopping HCTZ when she went on tikosyn.    He is currently taking eliquis 5mg BID for stroke prophylaxis and denies significant bleeding episodes. On tikosyn 250mcg BID. Kidney function is stable, with a creatinine of 0.76 on 2/9/2023.    I have personally reviewed the patient's EKG today, which shows sinus bradycardia at 39bpm. ID interval is 150. QRS is 98. QT is 480.    Relevant Cardiac Test Results:    MIRIAM (2/16/2023):  The left ventricle is normal in size with normal systolic function.  The estimated ejection fraction is 55%.  Normal right ventricular size with normal right ventricular systolic function.  No thrombus is present in the appendage. Normal appendage  "velocities.  Mild mitral regurgitation.  Grade 2 plaque present in the transverse aorta and descending aorta.    Current Outpatient Medications   Medication Sig    amLODIPine (NORVASC) 10 MG tablet Take 10 mg by mouth once daily.    apixaban (ELIQUIS) 5 mg Tab Take 1 tablet (5 mg total) by mouth 2 (two) times daily.    dofetilide (TIKOSYN) 250 MCG Cap Take 1 capsule (250 mcg total) by mouth every 12 (twelve) hours.    methIMAzole (TAPAZOLE) 5 MG Tab Take 5 mg by mouth once daily.    pantoprazole (PROTONIX) 40 MG tablet Take 1 tablet (40 mg total) by mouth once daily. Start taking this medication on 2/2/2023.     No current facility-administered medications for this visit.       Review of Systems   Constitutional: Negative for malaise/fatigue.   Cardiovascular:  Positive for leg swelling. Negative for chest pain, dyspnea on exertion, irregular heartbeat and palpitations.   Respiratory:  Negative for shortness of breath.    Hematologic/Lymphatic: Negative for bleeding problem.   Skin:  Negative for rash.   Musculoskeletal:  Negative for myalgias.   Gastrointestinal:  Negative for hematemesis, hematochezia and nausea.   Genitourinary:  Negative for hematuria.   Neurological:  Negative for light-headedness.   Psychiatric/Behavioral:  Negative for altered mental status.    Allergic/Immunologic: Negative for persistent infections.     Objective:          BP (!) 154/69   Pulse (!) 39   Ht 5' 4" (1.626 m)   Wt (!) 138 kg (304 lb 3.8 oz)   BMI 52.22 kg/m²     Physical Exam  Vitals and nursing note reviewed.   Constitutional:       Appearance: Normal appearance. She is well-developed.   HENT:      Head: Normocephalic.      Nose: Nose normal.   Eyes:      Pupils: Pupils are equal, round, and reactive to light.   Cardiovascular:      Rate and Rhythm: Regular rhythm. Bradycardia present.   Pulmonary:      Effort: No respiratory distress.      Breath sounds: Normal breath sounds.   Musculoskeletal:         General: Normal " range of motion.   Skin:     General: Skin is warm and dry.      Findings: No erythema.   Neurological:      Mental Status: She is alert and oriented to person, place, and time.   Psychiatric:         Speech: Speech normal.         Behavior: Behavior normal.         Lab Results   Component Value Date     02/09/2023    K 4.0 02/09/2023    MG 1.5 (L) 12/07/2022    BUN 12 02/09/2023    CREATININE 0.76 02/09/2023    ALT 13 12/09/2022    AST 16 12/09/2022    HGB 12.6 02/09/2023    HCT 37.8 02/09/2023       Recent Labs   Lab 11/29/22  1606 02/09/23  1603   INR 1.2 H 1.0       Assessment:     1. Persistent atrial fibrillation    2. Essential hypertension    3. Atrial tachycardia    4. Visit for monitoring Tikosyn therapy    5. Morbid obesity      Plan:     In summary, Ms. Junior is a 63 y.o. female with AF, bradycardia, PACs here for follow up after ablation.   She is 3 months s/p PVI. She is doing well from a rhythm standpoint, with no documented or symptomatic recurrence of arrhythmia since procedure. MIRIAM 2/16/2023 showed normal EF 55%.  She remains on tikosyn. QT 480ms. She is vu but asymptomatic. She is reporting worsening leg edema since stopping HCTZ (incompatible with tikosyn). She is also on amlodipine. Discussed case with Dr. Andrade. Will keep patient on tikosyn for now. Will switch amlodipine for losartan. Check BMP in 2 weeks. On eliquis for CVA prophylaxis.    Stop amlodipine and start losartan 50mg daily  BMP 2 weeks  Continue other meds  RTC 3 mo, sooner if needed    *A copy of this note has been sent to Dr. Andrade*    Follow up in about 3 months (around 8/16/2023).    ------------------------------------------------------------------    Sobeida Henriquez, CLAUDIA, NP-C  Cardiac Electrophysiology

## 2023-05-16 ENCOUNTER — OFFICE VISIT (OUTPATIENT)
Dept: ELECTROPHYSIOLOGY | Facility: CLINIC | Age: 63
End: 2023-05-16
Payer: COMMERCIAL

## 2023-05-16 VITALS
DIASTOLIC BLOOD PRESSURE: 69 MMHG | BODY MASS INDEX: 50.02 KG/M2 | SYSTOLIC BLOOD PRESSURE: 154 MMHG | WEIGHT: 293 LBS | HEART RATE: 39 BPM | HEIGHT: 64 IN

## 2023-05-16 DIAGNOSIS — Z79.899 VISIT FOR MONITORING TIKOSYN THERAPY: ICD-10-CM

## 2023-05-16 DIAGNOSIS — I10 ESSENTIAL HYPERTENSION: ICD-10-CM

## 2023-05-16 DIAGNOSIS — I48.19 PERSISTENT ATRIAL FIBRILLATION: Primary | ICD-10-CM

## 2023-05-16 DIAGNOSIS — E66.01 MORBID OBESITY: ICD-10-CM

## 2023-05-16 DIAGNOSIS — I47.19 ATRIAL TACHYCARDIA: ICD-10-CM

## 2023-05-16 DIAGNOSIS — R00.1 BRADYCARDIA ON ECG: ICD-10-CM

## 2023-05-16 DIAGNOSIS — Z51.81 VISIT FOR MONITORING TIKOSYN THERAPY: ICD-10-CM

## 2023-05-16 PROCEDURE — 1159F PR MEDICATION LIST DOCUMENTED IN MEDICAL RECORD: ICD-10-PCS | Mod: CPTII,S$GLB,, | Performed by: NURSE PRACTITIONER

## 2023-05-16 PROCEDURE — 93010 RHYTHM STRIP: ICD-10-PCS | Mod: S$GLB,,, | Performed by: INTERNAL MEDICINE

## 2023-05-16 PROCEDURE — 3008F BODY MASS INDEX DOCD: CPT | Mod: CPTII,S$GLB,, | Performed by: NURSE PRACTITIONER

## 2023-05-16 PROCEDURE — 3008F PR BODY MASS INDEX (BMI) DOCUMENTED: ICD-10-PCS | Mod: CPTII,S$GLB,, | Performed by: NURSE PRACTITIONER

## 2023-05-16 PROCEDURE — 1160F PR REVIEW ALL MEDS BY PRESCRIBER/CLIN PHARMACIST DOCUMENTED: ICD-10-PCS | Mod: CPTII,S$GLB,, | Performed by: NURSE PRACTITIONER

## 2023-05-16 PROCEDURE — 3078F PR MOST RECENT DIASTOLIC BLOOD PRESSURE < 80 MM HG: ICD-10-PCS | Mod: CPTII,S$GLB,, | Performed by: NURSE PRACTITIONER

## 2023-05-16 PROCEDURE — 99999 PR PBB SHADOW E&M-EST. PATIENT-LVL III: CPT | Mod: PBBFAC,,, | Performed by: NURSE PRACTITIONER

## 2023-05-16 PROCEDURE — 99214 PR OFFICE/OUTPT VISIT, EST, LEVL IV, 30-39 MIN: ICD-10-PCS | Mod: S$GLB,,, | Performed by: NURSE PRACTITIONER

## 2023-05-16 PROCEDURE — 93005 RHYTHM STRIP: ICD-10-PCS | Mod: S$GLB,,, | Performed by: INTERNAL MEDICINE

## 2023-05-16 PROCEDURE — 1159F MED LIST DOCD IN RCRD: CPT | Mod: CPTII,S$GLB,, | Performed by: NURSE PRACTITIONER

## 2023-05-16 PROCEDURE — 1160F RVW MEDS BY RX/DR IN RCRD: CPT | Mod: CPTII,S$GLB,, | Performed by: NURSE PRACTITIONER

## 2023-05-16 PROCEDURE — 99999 PR PBB SHADOW E&M-EST. PATIENT-LVL III: ICD-10-PCS | Mod: PBBFAC,,, | Performed by: NURSE PRACTITIONER

## 2023-05-16 PROCEDURE — 4010F ACE/ARB THERAPY RXD/TAKEN: CPT | Mod: CPTII,S$GLB,, | Performed by: NURSE PRACTITIONER

## 2023-05-16 PROCEDURE — 93010 ELECTROCARDIOGRAM REPORT: CPT | Mod: S$GLB,,, | Performed by: INTERNAL MEDICINE

## 2023-05-16 PROCEDURE — 99214 OFFICE O/P EST MOD 30 MIN: CPT | Mod: S$GLB,,, | Performed by: NURSE PRACTITIONER

## 2023-05-16 PROCEDURE — 3077F PR MOST RECENT SYSTOLIC BLOOD PRESSURE >= 140 MM HG: ICD-10-PCS | Mod: CPTII,S$GLB,, | Performed by: NURSE PRACTITIONER

## 2023-05-16 PROCEDURE — 4010F PR ACE/ARB THEARPY RXD/TAKEN: ICD-10-PCS | Mod: CPTII,S$GLB,, | Performed by: NURSE PRACTITIONER

## 2023-05-16 PROCEDURE — 3077F SYST BP >= 140 MM HG: CPT | Mod: CPTII,S$GLB,, | Performed by: NURSE PRACTITIONER

## 2023-05-16 PROCEDURE — 3078F DIAST BP <80 MM HG: CPT | Mod: CPTII,S$GLB,, | Performed by: NURSE PRACTITIONER

## 2023-05-16 PROCEDURE — 93005 ELECTROCARDIOGRAM TRACING: CPT | Mod: S$GLB,,, | Performed by: INTERNAL MEDICINE

## 2023-05-16 RX ORDER — LOSARTAN POTASSIUM 50 MG/1
50 TABLET ORAL DAILY
Qty: 30 TABLET | Refills: 3 | Status: SHIPPED | OUTPATIENT
Start: 2023-05-16 | End: 2023-06-19 | Stop reason: DRUGHIGH

## 2023-05-31 LAB
BUN SERPL-MCNC: 11 MG/DL (ref 7–25)
BUN/CREAT SERPL: NORMAL (CALC) (ref 6–22)
CALCIUM SERPL-MCNC: 9.1 MG/DL (ref 8.6–10.4)
CHLORIDE SERPL-SCNC: 105 MMOL/L (ref 98–110)
CO2 SERPL-SCNC: 28 MMOL/L (ref 20–32)
CREAT SERPL-MCNC: 0.89 MG/DL (ref 0.5–1.05)
EGFR: 72 ML/MIN/1.73M2
GLUCOSE SERPL-MCNC: 72 MG/DL (ref 65–99)
POTASSIUM SERPL-SCNC: 4.3 MMOL/L (ref 3.5–5.3)
SODIUM SERPL-SCNC: 140 MMOL/L (ref 135–146)

## 2023-06-18 PROBLEM — R31.9 URINARY TRACT INFECTION WITH HEMATURIA: Status: ACTIVE | Noted: 2023-06-18

## 2023-06-18 PROBLEM — I10 HYPERTENSION: Status: ACTIVE | Noted: 2023-06-18

## 2023-06-18 PROBLEM — N39.0 URINARY TRACT INFECTION WITH HEMATURIA: Status: ACTIVE | Noted: 2023-06-18

## 2023-06-19 ENCOUNTER — TELEPHONE (OUTPATIENT)
Dept: ELECTROPHYSIOLOGY | Facility: CLINIC | Age: 63
End: 2023-06-19
Payer: COMMERCIAL

## 2023-06-19 RX ORDER — LOSARTAN POTASSIUM 100 MG/1
100 TABLET ORAL DAILY
Qty: 90 TABLET | Refills: 3 | Status: SHIPPED | OUTPATIENT
Start: 2023-06-19 | End: 2024-06-18

## 2023-06-19 NOTE — TELEPHONE ENCOUNTER
Patient called and provided Dr Andrade recommendations as detailed below. Patient confirmed understanding of instructions provided, and will follow up with Dr Koehler.       MD Cassi Ding RN      Let's stop tikosyn. Her bradycardia has never bothered her. If it begins to bother her then we can discuss pacemaker. She can discuss resuming HCTZ with Dr. Koehler now that she is off tikosyn.           Previous Messages       ----- Message -----   From: Cassi Friedman RN   Sent: 6/19/2023   2:43 PM CDT   To: Prabhu Andrade MD     Patient was seen in our office on 5/16/2023 by Sobeida . At office visit, patient reported worsening leg edema since stopping HCTZ (incompatible with tikosyn). She was taking amlodipine., which was stopped at the office visit and changed to to losartan 50 mg daily.  Patient was seen in the ER yesterday for UTI. Bradycardia and HTN noted and instructed to follow up with our office. Patient states that her BP remains elevated with systolic BP in 190's and reports that her heart rate has been running in the 30's -40's .Confirms compliance with medication as prescribed including Tikosyn 250 mcg BID and Losartan 50mg daily.  Denies any current or recent symptoms. Followed by [facility] Cardiologist , Zakia MOE. Please advise.

## 2023-06-19 NOTE — TELEPHONE ENCOUNTER
Discussed with Dr. Koehler. Instructed to start taking Losartan 100mg daily. She will continue to check BP and call the office if BP is not better controlled. Will send in new Rx for Losartan 100mg. She verbalized understanding.

## 2023-06-19 NOTE — TELEPHONE ENCOUNTER
----- Message from Qiana Hilliard sent at 6/19/2023  3:14 PM CDT -----  Contact: [phone]  Patient called, would like a call back from Dr Koehler's nurse about patient pressure.   Please call and advise. Thank you.

## 2023-06-19 NOTE — TELEPHONE ENCOUNTER
----- Message from Sarah Lamb MA sent at 6/19/2023  9:51 AM CDT -----  Regarding: FW: medication    ----- Message -----  From: Marleen Ramon  Sent: 6/19/2023   8:52 AM CDT  To: [facility] Staff  Subject: medication                                       The pt is calling to report that the medication losartan (COZAAR) 50 MG tablet is still making her BP high. Please call her back @ [phone]. Thanks, Marleen

## 2023-06-20 PROBLEM — I48.0 PAROXYSMAL ATRIAL FIBRILLATION: Status: ACTIVE | Noted: 2023-06-20

## 2023-06-20 PROBLEM — E87.6 HYPOKALEMIA: Status: ACTIVE | Noted: 2023-06-20

## 2023-06-20 PROBLEM — R00.1 ASYMPTOMATIC BRADYCARDIA: Status: ACTIVE | Noted: 2023-06-20

## 2023-06-20 PROBLEM — I50.32 CHRONIC HEART FAILURE WITH PRESERVED EJECTION FRACTION (HFPEF): Status: ACTIVE | Noted: 2022-12-08

## 2023-06-20 PROBLEM — I47.10 PSVT (PAROXYSMAL SUPRAVENTRICULAR TACHYCARDIA): Status: ACTIVE | Noted: 2023-06-20

## 2023-06-21 ENCOUNTER — TELEPHONE (OUTPATIENT)
Dept: CARDIOLOGY | Facility: CLINIC | Age: 63
End: 2023-06-21
Payer: COMMERCIAL

## 2023-06-21 NOTE — TELEPHONE ENCOUNTER
----- Message from Virginie Clark sent at 6/21/2023  2:10 PM CDT -----  Contact: [phone]  Pt called to advise that she was recently d/c from the hospital (6/21/23) and was told to f/u with the provider in 7 days. There is nothing available until September. Please Advise

## 2023-06-22 ENCOUNTER — PATIENT OUTREACH (OUTPATIENT)
Dept: ADMINISTRATIVE | Facility: CLINIC | Age: 63
End: 2023-06-22
Payer: COMMERCIAL

## 2023-06-22 NOTE — PROGRESS NOTES
C3 nurse spoke with Roxane Junior for a TCC post hospital discharge follow up call. The patient reports does not have a scheduled HOSFU appointment. C3 nurse was unable to schedule [facility]. Patient advised to contact their PCP to schedule a HOSPFU within 5-7 days.

## 2023-06-27 ENCOUNTER — OFFICE VISIT (OUTPATIENT)
Dept: CARDIOLOGY | Facility: CLINIC | Age: 63
End: 2023-06-27
Payer: COMMERCIAL

## 2023-06-27 VITALS
OXYGEN SATURATION: 98 % | SYSTOLIC BLOOD PRESSURE: 136 MMHG | BODY MASS INDEX: 50.02 KG/M2 | WEIGHT: 293 LBS | HEART RATE: 44 BPM | DIASTOLIC BLOOD PRESSURE: 84 MMHG | HEIGHT: 64 IN

## 2023-06-27 DIAGNOSIS — I48.19 PERSISTENT ATRIAL FIBRILLATION: Primary | ICD-10-CM

## 2023-06-27 DIAGNOSIS — R00.1 BRADYCARDIA: ICD-10-CM

## 2023-06-27 DIAGNOSIS — I10 ESSENTIAL HYPERTENSION: ICD-10-CM

## 2023-06-27 PROCEDURE — 1159F PR MEDICATION LIST DOCUMENTED IN MEDICAL RECORD: ICD-10-PCS | Mod: CPTII,S$GLB,, | Performed by: INTERNAL MEDICINE

## 2023-06-27 PROCEDURE — 3079F DIAST BP 80-89 MM HG: CPT | Mod: CPTII,S$GLB,, | Performed by: INTERNAL MEDICINE

## 2023-06-27 PROCEDURE — 1159F MED LIST DOCD IN RCRD: CPT | Mod: CPTII,S$GLB,, | Performed by: INTERNAL MEDICINE

## 2023-06-27 PROCEDURE — 3075F SYST BP GE 130 - 139MM HG: CPT | Mod: CPTII,S$GLB,, | Performed by: INTERNAL MEDICINE

## 2023-06-27 PROCEDURE — 1111F DSCHRG MED/CURRENT MED MERGE: CPT | Mod: CPTII,S$GLB,, | Performed by: INTERNAL MEDICINE

## 2023-06-27 PROCEDURE — 3079F PR MOST RECENT DIASTOLIC BLOOD PRESSURE 80-89 MM HG: ICD-10-PCS | Mod: CPTII,S$GLB,, | Performed by: INTERNAL MEDICINE

## 2023-06-27 PROCEDURE — 99214 OFFICE O/P EST MOD 30 MIN: CPT | Mod: S$GLB,,, | Performed by: INTERNAL MEDICINE

## 2023-06-27 PROCEDURE — 3075F PR MOST RECENT SYSTOLIC BLOOD PRESS GE 130-139MM HG: ICD-10-PCS | Mod: CPTII,S$GLB,, | Performed by: INTERNAL MEDICINE

## 2023-06-27 PROCEDURE — 4010F ACE/ARB THERAPY RXD/TAKEN: CPT | Mod: CPTII,S$GLB,, | Performed by: INTERNAL MEDICINE

## 2023-06-27 PROCEDURE — 99999 PR PBB SHADOW E&M-EST. PATIENT-LVL III: CPT | Mod: PBBFAC,,, | Performed by: INTERNAL MEDICINE

## 2023-06-27 PROCEDURE — 99214 PR OFFICE/OUTPT VISIT, EST, LEVL IV, 30-39 MIN: ICD-10-PCS | Mod: S$GLB,,, | Performed by: INTERNAL MEDICINE

## 2023-06-27 PROCEDURE — 3008F PR BODY MASS INDEX (BMI) DOCUMENTED: ICD-10-PCS | Mod: CPTII,S$GLB,, | Performed by: INTERNAL MEDICINE

## 2023-06-27 PROCEDURE — 4010F PR ACE/ARB THEARPY RXD/TAKEN: ICD-10-PCS | Mod: CPTII,S$GLB,, | Performed by: INTERNAL MEDICINE

## 2023-06-27 PROCEDURE — 1111F PR DISCHARGE MEDS RECONCILED W/ CURRENT OUTPATIENT MED LIST: ICD-10-PCS | Mod: CPTII,S$GLB,, | Performed by: INTERNAL MEDICINE

## 2023-06-27 PROCEDURE — 99999 PR PBB SHADOW E&M-EST. PATIENT-LVL III: ICD-10-PCS | Mod: PBBFAC,,, | Performed by: INTERNAL MEDICINE

## 2023-06-27 PROCEDURE — 3008F BODY MASS INDEX DOCD: CPT | Mod: CPTII,S$GLB,, | Performed by: INTERNAL MEDICINE

## 2023-06-27 NOTE — PROGRESS NOTES
Cardiology    6/27/2023  9:06 AM    Problem list  Patient Active Problem List   Diagnosis    Right knee injury    Bradycardia    Essential hypertension    Hyperthyroidism    BMI 50.0-59.9, adult    Morbid obesity    Atrial tachycardia    Persistent atrial fibrillation    Chronic sinusitis    Chronic heart failure with preserved ejection fraction (HFpEF)    Abnormal heart rate    Visit for monitoring Tikosyn therapy    Urinary tract infection with hematuria    Hypertension    Hypokalemia    Paroxysmal atrial fibrillation    PSVT (paroxysmal supraventricular tachycardia)    Asymptomatic bradycardia       CC:  Hospital discharge f/u    HPI:  She was admitted to [facility] and discharged on 6/21.  She had UTI and given Abx and had palpitations so she was admitted.  She was seen by Dr Allred.  No additional cardiac testing.  She feels great today.  No CP, SOB, palpitation or syncope.  She has appt with EP in August.    Medications  Current Outpatient Medications   Medication Sig Dispense Refill    apixaban (ELIQUIS) 5 mg Tab Take 1 tablet (5 mg total) by mouth 2 (two) times daily. 60 tablet 11    hydroCHLOROthiazide (HYDRODIURIL) 12.5 MG Tab Take 1 tablet (12.5 mg total) by mouth once daily. 30 tablet 11    losartan (COZAAR) 100 MG tablet Take 1 tablet (100 mg total) by mouth once daily. 90 tablet 3    methIMAzole (TAPAZOLE) 5 MG Tab Take 5 mg by mouth once daily.      potassium chloride SA (K-DUR,KLOR-CON) 20 MEQ tablet Take 1 tablet (20 mEq total) by mouth once daily. 90 tablet 3     No current facility-administered medications for this visit.      Prior to Admission medications    Medication Sig Start Date End Date Taking? Authorizing Provider   apixaban (ELIQUIS) 5 mg Tab Take 1 tablet (5 mg total) by mouth 2 (two) times daily. 11/22/22  Yes Prabhu Andrade MD   hydroCHLOROthiazide (HYDRODIURIL) 12.5 MG Tab Take 1 tablet (12.5 mg total) by mouth once daily. 6/22/23 6/21/24 Yes Melba Miranda PA-C   losartan (COZAAR)  100 MG tablet Take 1 tablet (100 mg total) by mouth once daily. 6/19/23 6/18/24 Yes Montana Koehler MD   methIMAzole (TAPAZOLE) 5 MG Tab Take 5 mg by mouth once daily.   Yes Historical Provider   potassium chloride SA (K-DUR,KLOR-CON) 20 MEQ tablet Take 1 tablet (20 mEq total) by mouth once daily. 6/21/23 6/20/24 Yes Melba Miranda PA-C         History  Past Medical History:   Diagnosis Date    Atrial fibrillation     Hypertension     Hyperthyroidism      Past Surgical History:   Procedure Laterality Date    ABLATION OF ARRHYTHMOGENIC FOCUS FOR ATRIAL FIBRILLATION N/A 2/16/2023    Procedure: Ablation atrial fibrillation;  Surgeon: Prabhu Andrade MD;  Location: [facility] EP LAB;  Service: Cardiology;  Laterality: N/A;  AF, MIRIAM (Cx if SR), PVI, RFA, Carto, Gen, NC, 3 Prep    ECHOCARDIOGRAM,TRANSESOPHAGEAL N/A 2/16/2023    Procedure: Transesophageal echo (MIRIAM) intra-procedure log documentation;  Surgeon: Kunal Jacobsen MD;  Location: [facility] EP LAB;  Service: Cardiology;  Laterality: N/A;    TREATMENT OF CARDIAC ARRHYTHMIA N/A 12/6/2022    Procedure: CARDIOVERSION;  Surgeon: Prabhu Andrade MD;  Location: [facility] EP LAB;  Service: Cardiology;  Laterality: N/A;  afib, MIRIAM( Cx if pt in SR)  DCCV, anes, NC, 3 Prep *Tikosyn Admit post procedure*    TREATMENT OF CARDIAC ARRHYTHMIA  2/16/2023    Procedure: Cardioversion or Defibrillation;  Surgeon: Prabhu Andrade MD;  Location: [facility] EP LAB;  Service: Cardiology;;    TUBAL LIGATION       Social History     Socioeconomic History    Marital status: Single   Tobacco Use    Smoking status: Never    Smokeless tobacco: Never   Substance and Sexual Activity    Alcohol use: Yes     Comment: occ    Drug use: Never         Allergies  Review of patient's allergies indicates:  No Known Allergies      Review of Systems   Review of Systems   Constitutional: Negative for decreased appetite, fever and weight loss.   HENT:  Negative for congestion and nosebleeds.    Eyes:  Negative for double vision,  vision loss in left eye, vision loss in right eye and visual disturbance.   Cardiovascular:  Negative for chest pain, claudication, cyanosis, dyspnea on exertion, irregular heartbeat, leg swelling, near-syncope, orthopnea, palpitations, paroxysmal nocturnal dyspnea and syncope.   Respiratory:  Negative for cough, hemoptysis, shortness of breath, sleep disturbances due to breathing, snoring, sputum production and wheezing.    Endocrine: Negative for cold intolerance and heat intolerance.   Skin:  Negative for nail changes and rash.   Musculoskeletal:  Negative for joint pain, muscle cramps, muscle weakness and myalgias.   Gastrointestinal:  Negative for change in bowel habit, heartburn, hematemesis, hematochezia, hemorrhoids and melena.   Neurological:  Negative for dizziness, focal weakness and headaches.       Physical Exam  Wt Readings from Last 1 Encounters:   06/27/23 (!) 140.3 kg (309 lb 4.9 oz)     BP Readings from Last 3 Encounters:   06/27/23 136/84   06/21/23 (!) 166/79   06/18/23 (!) 170/82     Pulse Readings from Last 1 Encounters:   06/27/23 (!) 44     Body mass index is 53.09 kg/m².    Physical Exam  Vitals reviewed.   Constitutional:       Appearance: She is well-developed. She is obese.   HENT:      Head: Atraumatic.   Eyes:      General: No scleral icterus.  Neck:      Vascular: Normal carotid pulses. No carotid bruit, hepatojugular reflux or JVD.   Cardiovascular:      Rate and Rhythm: Regular rhythm. Bradycardia present.      Chest Wall: PMI is not displaced.      Pulses: Intact distal pulses.           Carotid pulses are 2+ on the right side and 2+ on the left side.       Radial pulses are 2+ on the right side and 2+ on the left side.        Dorsalis pedis pulses are 2+ on the right side and 2+ on the left side.      Heart sounds: S1 normal and S2 normal. Murmur heard.   Early systolic murmur is present with a grade of 2/6 at the upper right sternal border.     No friction rub.   Pulmonary:       Effort: Pulmonary effort is normal. No respiratory distress.      Breath sounds: Normal breath sounds. No stridor. No wheezing or rales.   Chest:      Chest wall: No tenderness.   Abdominal:      General: Bowel sounds are normal.      Palpations: Abdomen is soft.   Musculoskeletal:      Cervical back: Neck supple. No edema.      Right lower leg: No edema.      Left lower leg: No edema.   Skin:     General: Skin is warm and dry.      Nails: There is no clubbing.   Neurological:      Mental Status: She is alert and oriented to person, place, and time.   Psychiatric:         Behavior: Behavior normal.         Thought Content: Thought content normal.           Assessment  1. Persistent atrial fibrillation  S/p PVI    2. Essential hypertension  Well controlled on meds, continue to monitor    3. Bradycardia  Stable.  Not on any medications that can affect HR        Plan and Discussion  Continue with current blood pressure medication.  Keep scheduled appointment with EP in August.    Follow Up  3 months      Montana Koehler MD, F.A.C.C, F.S.C.A.I.        30 minutes were spent in chart review, documentation and review of results, and evaluation, treatment, and counseling of patient on the same day of service.    Disclaimer: This document was created using voice recognition software ([employer] Fluency Direct). Although it may be edited, this document may contain errors related to incorrect recognition of the spoken word. Please call the physician if clarification is needed.

## 2023-08-14 ENCOUNTER — TELEPHONE (OUTPATIENT)
Dept: ELECTROPHYSIOLOGY | Facility: CLINIC | Age: 63
End: 2023-08-14
Payer: COMMERCIAL

## 2023-08-14 NOTE — PROGRESS NOTES
Ms. Junior is a patient of Dr. Andrade and was last seen in clinic 5/16/2023.      Subjective:   Patient ID:  Roxane Junior is a 63 y.o. female who presents for follow up of Atrial Fibrillation  .     HPI:    Ms. Junior is a 63 y.o. female with AF (PVI 2/2023), bradycardia, PACs, hyperthyroid here for follow up.     Background:    Referring Cardiologist: Montana Koehler MD  Primary Care Physician: Dinesh Pacheco MD    Mrs. Junior has a history of hypertension, morbid obesity and bradycardia. She was referred to Dr. Koehler in 2021 for evaluation of bradycardia. She wore a holter monitor in July of 2021 which noted an average sinus rate of 41 bpm. There were periods during day and night time where her rate was in the 30s. She did have frequent PACs. She reports her heart rate has been like this for many years. She denies any fatigue, near syncope, or syncope. Since that visit she reported noting an episode of heart racing lasting 5 minutes in July of 2022. A 30 day event monitor was ordered which noted periods of bradycardia and an episode of long RP narrow complex tachycardia with a rate of 150 bpm (incorrectly read as atrial fibrillation by the monitoring company). This occurred at midnight on 9/9/2022. She was asleep. We discussed that should she begin to have frequent symptomatic episodes the treatment options would be EPS/ablation or attempt drug therapy which may require PPM implantation.    7/2021: Holter noted sinus bradycardia with frequent PACs (8% burden). Average rate 41 bpm.    9/2022: ECHO noted preserved LV function.    Available electrocardiograms in Epic which show sinus bradycardia.    Mrs. Junior returns for follow-up 11/22/2022. 30 day monitor noted a single episode of tachycardia with rate at 150 bpm. Initially suspected possible AFL with 2:1 AV conduction however re-evaluation noted a gradual slowing towards the end with the relationship being a long RP tachycardia. More likely this was  an AT. Today she is in atrial fibrillation. Reports palpitations for the past few days.  ECG is atrial fibrillation with an average ventricular rate of 123 bpm.  Her [id number] score is 2 and anticoagulation is currently recommended. She will start eliquis. I also discussed the goal to reduce symptomatic arrhythmic episodes by pharmacologic and/or procedural methods and utilizing a rhythm versus a rate control strategy. Due to symptoms, RVR, and young age I recommend rhythm control. Discussed the problematic nature of symptomatic AF with RVR and asymptomatic sinus bradycardia. Prefer to avoid PPM just for anti-arrhythmic drug therapy. Discussed dofetilide versus PVI. She is open to try dofetilide. If she is intolerant to this or it doesn't work then would advocate for PVI.  Plan: Admit for dofetilide initiation. MIRIAM/DCCV if she presents in AF. Start eliquis 5mg bid.    12/9/2022: DCCV +tikosyn. Had recurrence    2/16/2023: Successful pulmonary vein RF ablation.    5/16/2023: She is 3 months s/p PVI. She is doing well from a rhythm standpoint, with no documented or symptomatic recurrence of arrhythmia since procedure. MIRIAM 2/16/2023 showed normal EF 55%.  She remains on tikosyn. QT 480ms. She is vu but asymptomatic. She is reporting worsening leg edema since stopping HCTZ (incompatible with tikosyn). She is also on amlodipine. Discussed case with Dr. Andrade. Will keep patient on tikosyn for now. Will switch amlodipine for losartan. Check BMP in 2 weeks. On eliquis for CVA prophylaxis.    Update (08/16/2023):    6/18/2023: UTI    6/19/2023: pt reported continued HTN since stopping HCTZ. Tikosyn was stopped and HCTZ restarted.    6/20/2023: Pt went to ED with palps - some ECG c/w AFL with RVR. Discharged in SR.    Today she says she has not had any palpitations since her hospital visit. No cardiac complaints. Ms. Junior reports no chest pain with exertion or at rest, palpitations, SOB, RIZVI, dizziness, or  syncope.  BPs have been elevated.    She is currently taking eliquis 5mg BID for stroke prophylaxis and denies significant bleeding episodes. Kidney function is stable, with a creatinine of 0.9 on 6/21/2023.    I have personally reviewed the patient's EKG today, which shows sinus bradycardia at 41bpm. ME interval is 164. QRS is 98. QT is 444.    Relevant Cardiac Test Results:    MIRIAM (2/16/2023):  The left ventricle is normal in size with normal systolic function.  The estimated ejection fraction is 55%.  Normal right ventricular size with normal right ventricular systolic function.  No thrombus is present in the appendage. Normal appendage velocities.  Mild mitral regurgitation.  Grade 2 plaque present in the transverse aorta and descending aorta.    Current Outpatient Medications   Medication Sig    apixaban (ELIQUIS) 5 mg Tab Take 1 tablet (5 mg total) by mouth 2 (two) times daily.    hydroCHLOROthiazide (HYDRODIURIL) 12.5 MG Tab Take 1 tablet (12.5 mg total) by mouth once daily.    losartan (COZAAR) 100 MG tablet Take 1 tablet (100 mg total) by mouth once daily.    methIMAzole (TAPAZOLE) 5 MG Tab Take 5 mg by mouth once daily.    potassium chloride SA (K-DUR,KLOR-CON) 20 MEQ tablet Take 1 tablet (20 mEq total) by mouth once daily.     No current facility-administered medications for this visit.       Review of Systems   Constitutional: Negative for malaise/fatigue.   Cardiovascular:  Negative for chest pain, dyspnea on exertion, irregular heartbeat, leg swelling and palpitations.   Respiratory:  Negative for shortness of breath.    Hematologic/Lymphatic: Negative for bleeding problem.   Skin:  Negative for rash.   Musculoskeletal:  Negative for myalgias.   Gastrointestinal:  Negative for hematemesis, hematochezia and nausea.   Genitourinary:  Negative for hematuria.   Neurological:  Negative for light-headedness.   Psychiatric/Behavioral:  Negative for altered mental status.    Allergic/Immunologic: Negative for  persistent infections.       Objective:          BP (!) 144/80   Pulse (!) 41   Wt (!) 144.8 kg (319 lb 3.6 oz)   BMI 54.80 kg/m²     Physical Exam  Vitals and nursing note reviewed.   Constitutional:       Appearance: Normal appearance. She is well-developed.   HENT:      Head: Normocephalic.      Nose: Nose normal.   Eyes:      Pupils: Pupils are equal, round, and reactive to light.   Cardiovascular:      Rate and Rhythm: Regular rhythm. Bradycardia present.   Pulmonary:      Effort: No respiratory distress.      Breath sounds: Normal breath sounds.   Musculoskeletal:         General: Normal range of motion.   Skin:     General: Skin is warm and dry.      Findings: No erythema.   Neurological:      Mental Status: She is alert and oriented to person, place, and time.   Psychiatric:         Speech: Speech normal.         Behavior: Behavior normal.       Lab Results   Component Value Date     06/21/2023    K 3.4 (L) 06/21/2023    MG 1.7 06/20/2023    BUN 9 06/21/2023    CREATININE 0.9 06/21/2023    ALT 14 06/21/2023    AST 15 06/21/2023    HGB 13.4 06/21/2023    HCT 40.4 06/21/2023    TSH 4.322 (H) 06/20/2023       Recent Labs   Lab 11/29/22  1606 02/09/23  1603   INR 1.2 H 1.0       Assessment:     1. Atrial tachycardia    2. Essential hypertension    3. Paroxysmal atrial fibrillation    4. PSVT (paroxysmal supraventricular tachycardia)    5. Morbid obesity      Plan:     In summary, Ms. Junior is a 63 y.o. female with AF (PVI 2/2023), bradycardia, PACs, hyperthyroid here for follow up.   She is now 6 months s/p ablation. Tikosyn stopped 6/14/2023 so HCTZ could be restarted. She did have paroxysmal AFL with RVR breakthrough in context of UTI 6/20/2023.  No breakthroughs since then and she remains off tikosyn. Gonzalez is asymptomatic. Her BPs are elevated caio at home. Will add 5mg amlodipine (she had leg edema on 10mg dose when off HCTZ).  She remains on eliquis for CVA prophylaxis.    Start amlodipine 5mg  daily  Continue other meds  RTC 6 mo, sooner if needed    *A copy of this note has been sent to Dr. Andrade*    Follow up in about 6 months (around 2/16/2024).    ------------------------------------------------------------------    CLAUDIA Beckett, NP-C  Cardiac Electrophysiology

## 2023-08-16 ENCOUNTER — OFFICE VISIT (OUTPATIENT)
Dept: ELECTROPHYSIOLOGY | Facility: CLINIC | Age: 63
End: 2023-08-16
Payer: COMMERCIAL

## 2023-08-16 ENCOUNTER — HOSPITAL ENCOUNTER (OUTPATIENT)
Dept: CARDIOLOGY | Facility: CLINIC | Age: 63
Discharge: HOME | End: 2023-08-16
Payer: COMMERCIAL

## 2023-08-16 VITALS
BODY MASS INDEX: 54.8 KG/M2 | HEART RATE: 41 BPM | WEIGHT: 293 LBS | DIASTOLIC BLOOD PRESSURE: 80 MMHG | SYSTOLIC BLOOD PRESSURE: 144 MMHG

## 2023-08-16 DIAGNOSIS — I48.0 PAROXYSMAL ATRIAL FIBRILLATION: ICD-10-CM

## 2023-08-16 DIAGNOSIS — I47.19 ATRIAL TACHYCARDIA: Primary | ICD-10-CM

## 2023-08-16 DIAGNOSIS — E66.01 MORBID OBESITY: ICD-10-CM

## 2023-08-16 DIAGNOSIS — R00.1 BRADYCARDIA ON ECG: ICD-10-CM

## 2023-08-16 DIAGNOSIS — I10 ESSENTIAL HYPERTENSION: ICD-10-CM

## 2023-08-16 DIAGNOSIS — I47.10 PSVT (PAROXYSMAL SUPRAVENTRICULAR TACHYCARDIA): ICD-10-CM

## 2023-08-16 PROBLEM — Z79.899 VISIT FOR MONITORING TIKOSYN THERAPY: Status: RESOLVED | Noted: 2022-12-08 | Resolved: 2023-08-16

## 2023-08-16 PROBLEM — Z51.81 VISIT FOR MONITORING TIKOSYN THERAPY: Status: RESOLVED | Noted: 2022-12-08 | Resolved: 2023-08-16

## 2023-08-16 PROCEDURE — 99999 PR PBB SHADOW E&M-EST. PATIENT-LVL III: CPT | Mod: PBBFAC,,, | Performed by: NURSE PRACTITIONER

## 2023-08-16 PROCEDURE — 93005 ELECTROCARDIOGRAM TRACING: CPT | Mod: S$GLB,,, | Performed by: INTERNAL MEDICINE

## 2023-08-16 PROCEDURE — 99999 PR PBB SHADOW E&M-EST. PATIENT-LVL III: ICD-10-PCS | Mod: PBBFAC,,, | Performed by: NURSE PRACTITIONER

## 2023-08-16 PROCEDURE — 3079F PR MOST RECENT DIASTOLIC BLOOD PRESSURE 80-89 MM HG: ICD-10-PCS | Mod: CPTII,S$GLB,, | Performed by: NURSE PRACTITIONER

## 2023-08-16 PROCEDURE — 3008F PR BODY MASS INDEX (BMI) DOCUMENTED: ICD-10-PCS | Mod: CPTII,S$GLB,, | Performed by: NURSE PRACTITIONER

## 2023-08-16 PROCEDURE — 93005 RHYTHM STRIP: ICD-10-PCS | Mod: S$GLB,,, | Performed by: INTERNAL MEDICINE

## 2023-08-16 PROCEDURE — 93010 ELECTROCARDIOGRAM REPORT: CPT | Mod: S$GLB,,, | Performed by: INTERNAL MEDICINE

## 2023-08-16 PROCEDURE — 99214 PR OFFICE/OUTPT VISIT, EST, LEVL IV, 30-39 MIN: ICD-10-PCS | Mod: S$GLB,,, | Performed by: NURSE PRACTITIONER

## 2023-08-16 PROCEDURE — 4010F ACE/ARB THERAPY RXD/TAKEN: CPT | Mod: CPTII,S$GLB,, | Performed by: NURSE PRACTITIONER

## 2023-08-16 PROCEDURE — 3077F PR MOST RECENT SYSTOLIC BLOOD PRESSURE >= 140 MM HG: ICD-10-PCS | Mod: CPTII,S$GLB,, | Performed by: NURSE PRACTITIONER

## 2023-08-16 PROCEDURE — 4010F PR ACE/ARB THEARPY RXD/TAKEN: ICD-10-PCS | Mod: CPTII,S$GLB,, | Performed by: NURSE PRACTITIONER

## 2023-08-16 PROCEDURE — 99214 OFFICE O/P EST MOD 30 MIN: CPT | Mod: S$GLB,,, | Performed by: NURSE PRACTITIONER

## 2023-08-16 PROCEDURE — 1159F MED LIST DOCD IN RCRD: CPT | Mod: CPTII,S$GLB,, | Performed by: NURSE PRACTITIONER

## 2023-08-16 PROCEDURE — 3079F DIAST BP 80-89 MM HG: CPT | Mod: CPTII,S$GLB,, | Performed by: NURSE PRACTITIONER

## 2023-08-16 PROCEDURE — 3008F BODY MASS INDEX DOCD: CPT | Mod: CPTII,S$GLB,, | Performed by: NURSE PRACTITIONER

## 2023-08-16 PROCEDURE — 3077F SYST BP >= 140 MM HG: CPT | Mod: CPTII,S$GLB,, | Performed by: NURSE PRACTITIONER

## 2023-08-16 PROCEDURE — 93010 RHYTHM STRIP: ICD-10-PCS | Mod: S$GLB,,, | Performed by: INTERNAL MEDICINE

## 2023-08-16 PROCEDURE — 1159F PR MEDICATION LIST DOCUMENTED IN MEDICAL RECORD: ICD-10-PCS | Mod: CPTII,S$GLB,, | Performed by: NURSE PRACTITIONER

## 2023-08-16 RX ORDER — AMLODIPINE BESYLATE 5 MG/1
5 TABLET ORAL DAILY
Qty: 30 TABLET | Refills: 11 | Status: SHIPPED | OUTPATIENT
Start: 2023-08-16

## 2023-09-08 NOTE — PROGRESS NOTES
[facility] - Cardiology Stepdown  Cardiac Electrophysiology  Progress Note    Admission Date: 12/6/2022  Code Status: Full Code   Attending Physician: Shelly Nathan MD   Expected Discharge Date: 12/9/2022  Principal Problem:New onset atrial fibrillation    Subjective:     Interval History: Patient is doing well and tolerating tikosyn. Telemetry and ECG have been reviewed: LINCOLN Roth,       Objective:     Vital Signs (Most Recent):  Temp: 98.2 °F (36.8 °C) (12/08/22 1144)  Pulse: (!) 51 (12/08/22 1510)  Resp: 15 (12/08/22 1144)  BP: 133/69 (12/08/22 1144)  SpO2: (!) 91 % (12/08/22 1144)   Vital Signs (24h Range):  Temp:  [97.8 °F (36.6 °C)-98.3 °F (36.8 °C)] 98.2 °F (36.8 °C)  Pulse:  [35-51] 51  Resp:  [15-18] 15  SpO2:  [91 %-100 %] 91 %  BP: ()/(51-69) 133/69     Weight: 134.9 kg (297 lb 6.4 oz)  Body mass index is 51.05 kg/m².     SpO2: (!) 91 %  O2 Device (Oxygen Therapy): room air    Physical Exam    Vitals reviewed.   Constitutional:       General: She is not in acute distress.     Appearance: She is well-developed. She is not diaphoretic.   HENT:      Head: Normocephalic and atraumatic.   Eyes:      General:         Right eye: No discharge.         Left eye: No discharge.      Conjunctiva/sclera: Conjunctivae normal.   Cardiovascular:      Rate and Rhythm: sinus bradycardia present.      Heart sounds: No murmur heard.    No friction rub. No gallop. .  Pulmonary:      Effort: Pulmonary effort is normal. No respiratory distress.      Breath sounds: Normal breath sounds. No wheezing or rales.   Abdominal:      General: Bowel sounds are normal. There is no distension.      Palpations: Abdomen is soft.      Tenderness: There is no abdominal tenderness.   Musculoskeletal:      Cervical back: Neck supple.   Skin:     General: Skin is warm and dry.   Neurological:      Mental Status: She is alert and oriented to person, place, and time.   Psychiatric:         Behavior: Behavior normal.         Thought  Content: Thought content normal.         Judgment: Judgment normal.        Significant Labs:   Recent Lab Results       None              Assessment and Plan:     * New onset atrial fibrillation  Ms. Junior is a 62 year old W w/ PMHx with hypertension, morbid obesity, asymptomatic sinus bradycardia, and paroxysmal tachycardia now recently diagnosed with atrial fibrillation with RVR. [place] [facility] [place] MIRIAM/DCCV due to AF with plan for Tikosyn post procedure with Dr. Andrade.     Recommendations:  -Continue dofetilide 250mg BID  -Repeat ECG 2 hours post each dose of dofetilide  - Continue eliquis 5mg BID  - Please contact us if any questions/concerns arrise.  - We will monitor overnight after 5 dose and likely she can be dc tomorrow if no issues.     Discussed with Dr. Andrade and EP fellow.      Discussed w/ Dr. Andrade and EP fellow        Dodie Aguirre MD  Cardiac Electrophysiology  [facility] - Cardiology Stepdown   Hydroxychloroquine Counseling:  I discussed with the patient that a baseline ophthalmologic exam is needed at the start of therapy and every year thereafter while on therapy. A CBC may also be warranted for monitoring.  The side effects of this medication were discussed with the patient, including but not limited to agranulocytosis, aplastic anemia, seizures, rashes, retinopathy, and liver toxicity. Patient instructed to call the office should any adverse effect occur.  The patient verbalized understanding of the proper use and possible adverse effects of Plaquenil.  All the patient's questions and concerns were addressed.

## 2023-09-18 PROBLEM — N39.0 URINARY TRACT INFECTION WITH HEMATURIA: Status: RESOLVED | Noted: 2023-06-18 | Resolved: 2023-09-18

## 2023-09-18 PROBLEM — R31.9 URINARY TRACT INFECTION WITH HEMATURIA: Status: RESOLVED | Noted: 2023-06-18 | Resolved: 2023-09-18

## 2023-09-20 ENCOUNTER — OFFICE VISIT (OUTPATIENT)
Dept: CARDIOLOGY | Facility: CLINIC | Age: 63
End: 2023-09-20
Payer: COMMERCIAL

## 2023-09-20 VITALS
BODY MASS INDEX: 54.49 KG/M2 | WEIGHT: 293 LBS | DIASTOLIC BLOOD PRESSURE: 62 MMHG | HEART RATE: 48 BPM | SYSTOLIC BLOOD PRESSURE: 118 MMHG

## 2023-09-20 DIAGNOSIS — I10 ESSENTIAL HYPERTENSION: ICD-10-CM

## 2023-09-20 DIAGNOSIS — I48.19 PERSISTENT ATRIAL FIBRILLATION: Primary | ICD-10-CM

## 2023-09-20 PROCEDURE — 3078F DIAST BP <80 MM HG: CPT | Mod: CPTII,S$GLB,, | Performed by: INTERNAL MEDICINE

## 2023-09-20 PROCEDURE — 3074F SYST BP LT 130 MM HG: CPT | Mod: CPTII,S$GLB,, | Performed by: INTERNAL MEDICINE

## 2023-09-20 PROCEDURE — 1159F PR MEDICATION LIST DOCUMENTED IN MEDICAL RECORD: ICD-10-PCS | Mod: CPTII,S$GLB,, | Performed by: INTERNAL MEDICINE

## 2023-09-20 PROCEDURE — 1159F MED LIST DOCD IN RCRD: CPT | Mod: CPTII,S$GLB,, | Performed by: INTERNAL MEDICINE

## 2023-09-20 PROCEDURE — 99999 PR PBB SHADOW E&M-EST. PATIENT-LVL III: CPT | Mod: PBBFAC,,, | Performed by: INTERNAL MEDICINE

## 2023-09-20 PROCEDURE — 3074F PR MOST RECENT SYSTOLIC BLOOD PRESSURE < 130 MM HG: ICD-10-PCS | Mod: CPTII,S$GLB,, | Performed by: INTERNAL MEDICINE

## 2023-09-20 PROCEDURE — 3008F BODY MASS INDEX DOCD: CPT | Mod: CPTII,S$GLB,, | Performed by: INTERNAL MEDICINE

## 2023-09-20 PROCEDURE — 4010F PR ACE/ARB THEARPY RXD/TAKEN: ICD-10-PCS | Mod: CPTII,S$GLB,, | Performed by: INTERNAL MEDICINE

## 2023-09-20 PROCEDURE — 99214 OFFICE O/P EST MOD 30 MIN: CPT | Mod: S$GLB,,, | Performed by: INTERNAL MEDICINE

## 2023-09-20 PROCEDURE — 3078F PR MOST RECENT DIASTOLIC BLOOD PRESSURE < 80 MM HG: ICD-10-PCS | Mod: CPTII,S$GLB,, | Performed by: INTERNAL MEDICINE

## 2023-09-20 PROCEDURE — 3008F PR BODY MASS INDEX (BMI) DOCUMENTED: ICD-10-PCS | Mod: CPTII,S$GLB,, | Performed by: INTERNAL MEDICINE

## 2023-09-20 PROCEDURE — 99999 PR PBB SHADOW E&M-EST. PATIENT-LVL III: ICD-10-PCS | Mod: PBBFAC,,, | Performed by: INTERNAL MEDICINE

## 2023-09-20 PROCEDURE — 4010F ACE/ARB THERAPY RXD/TAKEN: CPT | Mod: CPTII,S$GLB,, | Performed by: INTERNAL MEDICINE

## 2023-09-20 PROCEDURE — 99214 PR OFFICE/OUTPT VISIT, EST, LEVL IV, 30-39 MIN: ICD-10-PCS | Mod: S$GLB,,, | Performed by: INTERNAL MEDICINE

## 2023-09-20 NOTE — PROGRESS NOTES
Cardiology    9/20/2023  12:13 PM    Problem list  Patient Active Problem List   Diagnosis    Right knee injury    Bradycardia    Essential hypertension    Hyperthyroidism    BMI 50.0-59.9, adult    Morbid obesity    Atrial tachycardia    Persistent atrial fibrillation    Chronic sinusitis    Chronic heart failure with preserved ejection fraction (HFpEF)    Abnormal heart rate    Hypertension    Hypokalemia    Paroxysmal atrial fibrillation    PSVT (paroxysmal supraventricular tachycardia)    Asymptomatic bradycardia       CC:  Follow-up    HPI:  She is been doing well.  She is here for follow-up.  She saw EP last month and amlodipine 5 mg was started.  She is tolerating her medication.  She denies any swelling.  She denies any chest pain or shortness of breath.  She denies any bleeding.  She denies any palpitation or syncope.    Medications  Current Outpatient Medications   Medication Sig Dispense Refill    amLODIPine (NORVASC) 5 MG tablet Take 1 tablet (5 mg total) by mouth once daily. 30 tablet 11    apixaban (ELIQUIS) 5 mg Tab Take 1 tablet (5 mg total) by mouth 2 (two) times daily. 60 tablet 11    hydroCHLOROthiazide (HYDRODIURIL) 12.5 MG Tab Take 1 tablet (12.5 mg total) by mouth once daily. 30 tablet 11    losartan (COZAAR) 100 MG tablet Take 1 tablet (100 mg total) by mouth once daily. 90 tablet 3    methIMAzole (TAPAZOLE) 5 MG Tab Take 5 mg by mouth once daily.      potassium chloride SA (K-DUR,KLOR-CON) 20 MEQ tablet Take 1 tablet (20 mEq total) by mouth once daily. 90 tablet 3     No current facility-administered medications for this visit.      Prior to Admission medications    Medication Sig Start Date End Date Taking? Authorizing Provider   amLODIPine (NORVASC) 5 MG tablet Take 1 tablet (5 mg total) by mouth once daily. 8/16/23  Yes Sobeida Henriquez, NP   apixaban (ELIQUIS) 5 mg Tab Take 1 tablet (5 mg total) by mouth 2 (two) times daily. 11/22/22  Yes Prabhu Andrade MD    hydroCHLOROthiazide (HYDRODIURIL) 12.5 MG Tab Take 1 tablet (12.5 mg total) by mouth once daily. 6/22/23 6/21/24 Yes Melba Miranda PA-C   losartan (COZAAR) 100 MG tablet Take 1 tablet (100 mg total) by mouth once daily. 6/19/23 6/18/24 Yes Montana Koehler MD   methIMAzole (TAPAZOLE) 5 MG Tab Take 5 mg by mouth once daily.   Yes Provider, Historical   potassium chloride SA (K-DUR,KLOR-CON) 20 MEQ tablet Take 1 tablet (20 mEq total) by mouth once daily. 6/21/23 6/20/24 Yes Melba Miranda PA-C         History  Past Medical History:   Diagnosis Date    Atrial fibrillation     Hypertension     Hyperthyroidism     Visit for monitoring Tikosyn therapy 12/8/2022     Past Surgical History:   Procedure Laterality Date    ABLATION OF ARRHYTHMOGENIC FOCUS FOR ATRIAL FIBRILLATION N/A 2/16/2023    Procedure: Ablation atrial fibrillation;  Surgeon: Prabhu Andrade MD;  Location: [facility] EP LAB;  Service: Cardiology;  Laterality: N/A;  AF, MIRIAM (Cx if SR), PVI, RFA, Carto, Gen, [place], 3 Prep    ECHOCARDIOGRAM,TRANSESOPHAGEAL N/A 2/16/2023    Procedure: Transesophageal echo (MIRIAM) intra-procedure log documentation;  Surgeon: Kunal Jacobsen MD;  Location: [facility] EP LAB;  Service: Cardiology;  Laterality: N/A;    TREATMENT OF CARDIAC ARRHYTHMIA N/A 12/6/2022    Procedure: CARDIOVERSION;  Surgeon: Prabhu Andrade MD;  Location: [facility] EP LAB;  Service: Cardiology;  Laterality: N/A;  afib, MIRIAM( Cx if pt in SR)  DCCV, anes, [place], 3 Prep *Tikosyn Admit post procedure*    TREATMENT OF CARDIAC ARRHYTHMIA  2/16/2023    Procedure: Cardioversion or Defibrillation;  Surgeon: Prabhu Andrade MD;  Location: [facility] EP LAB;  Service: Cardiology;;    TUBAL LIGATION       Social History     Socioeconomic History    Marital status: Single   Tobacco Use    Smoking status: Never    Smokeless tobacco: Never   Substance and Sexual Activity    Alcohol use: Yes     Comment: occ    Drug use: Never         Allergies  Review of patient's allergies indicates:  No Known  Allergies      Review of Systems   Review of Systems   Constitutional: Negative for decreased appetite, fever and weight loss.   HENT:  Negative for congestion and nosebleeds.    Eyes:  Negative for double vision, vision loss in left eye, vision loss in right eye and visual disturbance.   Cardiovascular:  Negative for chest pain, claudication, cyanosis, dyspnea on exertion, irregular heartbeat, leg swelling, near-syncope, orthopnea, palpitations, paroxysmal nocturnal dyspnea and syncope.   Respiratory:  Negative for cough, hemoptysis, shortness of breath, sleep disturbances due to breathing, snoring, sputum production and wheezing.    Endocrine: Negative for cold intolerance and heat intolerance.   Skin:  Negative for nail changes and rash.   Musculoskeletal:  Negative for joint pain, muscle cramps, muscle weakness and myalgias.   Gastrointestinal:  Negative for change in bowel habit, heartburn, hematemesis, hematochezia, hemorrhoids and melena.   Neurological:  Negative for dizziness, focal weakness and headaches.         Physical Exam  Wt Readings from Last 1 Encounters:   09/20/23 (!) 144 kg (317 lb 7.4 oz)     BP Readings from Last 3 Encounters:   09/20/23 118/62   08/16/23 (!) 144/80   06/27/23 136/84     Pulse Readings from Last 1 Encounters:   09/20/23 (!) 48     Body mass index is 54.49 kg/m².    Physical Exam        Assessment  1. Persistent atrial fibrillation  Stable, on Eliquis    2. Essential hypertension  Well controlled on current medications, continue medications and monitor    3.  BMI 55  Unchanged        Plan and Discussion  Recommend to continue with current medications and monitor blood pressure.    Follow Up  Six months      Montana Koehler MD, F.A.C.C, F.S.C.A.I.        30 minutes were spent in chart review, documentation and review of results, and evaluation, treatment, and counseling of patient on the same day of service.    Disclaimer: This document was created using voice recognition  software (M*Modal Fluency Direct). Although it may be edited, this document may contain errors related to incorrect recognition of the spoken word. Please call the physician if clarification is needed.

## 2023-10-11 NOTE — H&P (VIEW-ONLY)
CRS Office Visit History and Physical    Referring Md:   Christa Kaur Md  [address]  [address]  [place] [address]    SUBJECTIVE:     Chief Complaint: colon mass    History of Present Illness:  The patient is a new patient to this practice.   Course is as follows:  Patient is a 63 y.o. female presents with colon mass  23:  Presented to the ER with abdominal pain.  CT demonstrated 5cm colonic mass with associated stranding in the mid sigmoid colon.   She was discharged in treated as an outpatient with antibiotics.  Left lower quadrant abdominal pain has resolved.  No prior similar episodes.  No personal or family history of diverticulitis.  No family history of colorectal cancer or inflammatory bowel disease.  Past abdominal surgeries include .  No significant change in her weight.  No fatigue.  She has bowel movements 2-3 times per day.  No change in her bowel movements over time.  She is on therapeutic anticoagulation with Eliquis secondary to the cardiac ablation..    Last Colonoscopy:  Never    Review of patient's allergies indicates:  No Known Allergies    Past Medical History:   Diagnosis Date    Atrial fibrillation     Hypertension     Hyperthyroidism     Visit for monitoring Tikosyn therapy 2022     Past Surgical History:   Procedure Laterality Date    ABLATION OF ARRHYTHMOGENIC FOCUS FOR ATRIAL FIBRILLATION N/A 2023    Procedure: Ablation atrial fibrillation;  Surgeon: Prabhu Andrade MD;  Location: [facility] EP LAB;  Service: Cardiology;  Laterality: N/A;  AF, MIRIAM (Cx if SR), PVI, RFA, Carto, Gen, [place], 3 Prep    ECHOCARDIOGRAM,TRANSESOPHAGEAL N/A 2023    Procedure: Transesophageal echo (MIRIAM) intra-procedure log documentation;  Surgeon: Kunal Jacobsen MD;  Location: [facility] EP LAB;  Service: Cardiology;  Laterality: N/A;    TREATMENT OF CARDIAC ARRHYTHMIA N/A 2022    Procedure: CARDIOVERSION;  Surgeon: Prabhu Andrade MD;  Location: [facility] EP LAB;  Service: Cardiology;  " Laterality: N/A;  afib, MIRIAM( Cx if pt in SR)  DCCV, anes, [place], 3 Prep *Tikosyn Admit post procedure*    TREATMENT OF CARDIAC ARRHYTHMIA  2/16/2023    Procedure: Cardioversion or Defibrillation;  Surgeon: Prabhu Andrade MD;  Location: [facility] EP LAB;  Service: Cardiology;;    TUBAL LIGATION       History reviewed. No pertinent family history.  Social History     Tobacco Use    Smoking status: Never    Smokeless tobacco: Never   Substance Use Topics    Alcohol use: Yes     Comment: occ    Drug use: Never        Review of Systems:  Review of Systems   Constitutional:  Negative for chills, diaphoresis, fever, malaise/fatigue and weight loss.   HENT:  Negative for congestion.    Respiratory:  Negative for shortness of breath.    Cardiovascular:  Negative for chest pain and leg swelling.   Gastrointestinal:  Negative for abdominal pain, blood in stool, constipation, nausea and vomiting.   Genitourinary:  Negative for dysuria.   Musculoskeletal:  Negative for back pain and myalgias.   Skin:  Negative for rash.   Neurological:  Negative for dizziness and weakness.   Endo/Heme/Allergies:  Bruises/bleeds easily.   Psychiatric/Behavioral:  Negative for depression.        OBJECTIVE:     Vital Signs (Most Recent)  /78 (BP Location: Left arm, Patient Position: Sitting, BP Method: Small (Automatic))   Pulse 78   Ht 5' 4" (1.626 m)   Wt (!) 143.9 kg (317 lb 2.1 oz)   BMI 54.44 kg/m²     Physical Exam:  General: Black or  female in no distress   Neuro: alert and oriented x 4.  Moves all extremities.     HEENT: no icterus.  Trachea midline  Respiratory: respirations are even and unlabored  Cardiac: regular rate  Abdomen:  Obese, protuberant, soft, no masses  Extremities: Warm dry and intact  Skin: no rashes  Anorectal:  Deferred    Labs:  H&H 14 and 43.  Creatinine 1.3.  Albumin 3.9.    Imaging:   CT abd pelvis 9/28/23 reviewed   - Diverticulitis in the sigmoid colon.     - Inflammatory mass in the distal " descending colon 5 cm round.  While this could represent a mass within the wall of the colon, colonic neoplasm is difficult to exclude      ASSESSMENT/PLAN:     Diagnoses and all orders for this visit:    Colonic mass  -     Ambulatory referral/consult to Gastroenterology        63 y.o. female with segmental inflammation of the proximal sigmoid colon.  Discussed with the patient the possibilities including malignancy versus diverticular disease with abscess versus ischemia or infection or inflammatory bowel disease.  Most concern for malignancy versus diverticular disease.  Recommended proceeding with urgent colonoscopy for further evaluation and management.  Multiple photos were shown to her today as well as diagrams.  Message to the endoscopy schedulers sent.    BAKARI Whitman MD, FACS, FASCRS  Staff Surgeon  Colon & Rectal Surgery

## 2023-10-11 NOTE — PROGRESS NOTES
CRS Office Visit History and Physical    Referring Md:   Christa Kaur Md  [address]  [address]  [place] [address]    SUBJECTIVE:     Chief Complaint: colon mass    History of Present Illness:  The patient is a new patient to this practice.   Course is as follows:  Patient is a 63 y.o. female presents with colon mass  23:  Presented to the ER with abdominal pain.  CT demonstrated 5cm colonic mass with associated stranding in the mid sigmoid colon.   She was discharged in treated as an outpatient with antibiotics.  Left lower quadrant abdominal pain has resolved.  No prior similar episodes.  No personal or family history of diverticulitis.  No family history of colorectal cancer or inflammatory bowel disease.  Past abdominal surgeries include .  No significant change in her weight.  No fatigue.  She has bowel movements 2-3 times per day.  No change in her bowel movements over time.  She is on therapeutic anticoagulation with Eliquis secondary to the cardiac ablation..    Last Colonoscopy:  Never    Review of patient's allergies indicates:  No Known Allergies    Past Medical History:   Diagnosis Date    Atrial fibrillation     Hypertension     Hyperthyroidism     Visit for monitoring Tikosyn therapy 2022     Past Surgical History:   Procedure Laterality Date    ABLATION OF ARRHYTHMOGENIC FOCUS FOR ATRIAL FIBRILLATION N/A 2023    Procedure: Ablation atrial fibrillation;  Surgeon: Prabhu Andrade MD;  Location: [facility] EP LAB;  Service: Cardiology;  Laterality: N/A;  AF, MIRIAM (Cx if SR), PVI, RFA, Carto, Gen, [place], 3 Prep    ECHOCARDIOGRAM,TRANSESOPHAGEAL N/A 2023    Procedure: Transesophageal echo (MIRIAM) intra-procedure log documentation;  Surgeon: Kunal Jacobsen MD;  Location: [facility] EP LAB;  Service: Cardiology;  Laterality: N/A;    TREATMENT OF CARDIAC ARRHYTHMIA N/A 2022    Procedure: CARDIOVERSION;  Surgeon: Prabhu Andrade MD;  Location: [facility] EP LAB;  Service: Cardiology;  " Laterality: N/A;  afib, MIRIAM( Cx if pt in SR)  DCCV, anes, [place], 3 Prep *Tikosyn Admit post procedure*    TREATMENT OF CARDIAC ARRHYTHMIA  2/16/2023    Procedure: Cardioversion or Defibrillation;  Surgeon: Prabhu Andrade MD;  Location: [facility] EP LAB;  Service: Cardiology;;    TUBAL LIGATION       History reviewed. No pertinent family history.  Social History     Tobacco Use    Smoking status: Never    Smokeless tobacco: Never   Substance Use Topics    Alcohol use: Yes     Comment: occ    Drug use: Never        Review of Systems:  Review of Systems   Constitutional:  Negative for chills, diaphoresis, fever, malaise/fatigue and weight loss.   HENT:  Negative for congestion.    Respiratory:  Negative for shortness of breath.    Cardiovascular:  Negative for chest pain and leg swelling.   Gastrointestinal:  Negative for abdominal pain, blood in stool, constipation, nausea and vomiting.   Genitourinary:  Negative for dysuria.   Musculoskeletal:  Negative for back pain and myalgias.   Skin:  Negative for rash.   Neurological:  Negative for dizziness and weakness.   Endo/Heme/Allergies:  Bruises/bleeds easily.   Psychiatric/Behavioral:  Negative for depression.        OBJECTIVE:     Vital Signs (Most Recent)  /78 (BP Location: Left arm, Patient Position: Sitting, BP Method: Small (Automatic))   Pulse 78   Ht 5' 4" (1.626 m)   Wt (!) 143.9 kg (317 lb 2.1 oz)   BMI 54.44 kg/m²     Physical Exam:  General: Black or  female in no distress   Neuro: alert and oriented x 4.  Moves all extremities.     HEENT: no icterus.  Trachea midline  Respiratory: respirations are even and unlabored  Cardiac: regular rate  Abdomen:  Obese, protuberant, soft, no masses  Extremities: Warm dry and intact  Skin: no rashes  Anorectal:  Deferred    Labs:  H&H 14 and 43.  Creatinine 1.3.  Albumin 3.9.    Imaging:   CT abd pelvis 9/28/23 reviewed   - Diverticulitis in the sigmoid colon.     - Inflammatory mass in the distal " descending colon 5 cm round.  While this could represent a mass within the wall of the colon, colonic neoplasm is difficult to exclude      ASSESSMENT/PLAN:     Diagnoses and all orders for this visit:    Colonic mass  -     Ambulatory referral/consult to Gastroenterology        63 y.o. female with segmental inflammation of the proximal sigmoid colon.  Discussed with the patient the possibilities including malignancy versus diverticular disease with abscess versus ischemia or infection or inflammatory bowel disease.  Most concern for malignancy versus diverticular disease.  Recommended proceeding with urgent colonoscopy for further evaluation and management.  Multiple photos were shown to her today as well as diagrams.  Message to the endoscopy schedulers sent.    BAKARI Whitman MD, FACS, FASCRS  Staff Surgeon  Colon & Rectal Surgery

## 2023-10-12 ENCOUNTER — OFFICE VISIT (OUTPATIENT)
Dept: SURGERY | Facility: CLINIC | Age: 63
End: 2023-10-12
Payer: COMMERCIAL

## 2023-10-12 VITALS
BODY MASS INDEX: 50.02 KG/M2 | HEART RATE: 78 BPM | DIASTOLIC BLOOD PRESSURE: 78 MMHG | HEIGHT: 64 IN | WEIGHT: 293 LBS | SYSTOLIC BLOOD PRESSURE: 120 MMHG

## 2023-10-12 DIAGNOSIS — K63.89 COLONIC MASS: ICD-10-CM

## 2023-10-12 PROCEDURE — 3074F SYST BP LT 130 MM HG: CPT | Mod: CPTII,S$GLB,, | Performed by: COLON & RECTAL SURGERY

## 2023-10-12 PROCEDURE — 99204 PR OFFICE/OUTPT VISIT, NEW, LEVL IV, 45-59 MIN: ICD-10-PCS | Mod: S$GLB,,, | Performed by: COLON & RECTAL SURGERY

## 2023-10-12 PROCEDURE — 1160F PR REVIEW ALL MEDS BY PRESCRIBER/CLIN PHARMACIST DOCUMENTED: ICD-10-PCS | Mod: CPTII,S$GLB,, | Performed by: COLON & RECTAL SURGERY

## 2023-10-12 PROCEDURE — 4010F ACE/ARB THERAPY RXD/TAKEN: CPT | Mod: CPTII,S$GLB,, | Performed by: COLON & RECTAL SURGERY

## 2023-10-12 PROCEDURE — 99204 OFFICE O/P NEW MOD 45 MIN: CPT | Mod: S$GLB,,, | Performed by: COLON & RECTAL SURGERY

## 2023-10-12 PROCEDURE — 1159F MED LIST DOCD IN RCRD: CPT | Mod: CPTII,S$GLB,, | Performed by: COLON & RECTAL SURGERY

## 2023-10-12 PROCEDURE — 99999 PR PBB SHADOW E&M-EST. PATIENT-LVL III: CPT | Mod: PBBFAC,,, | Performed by: COLON & RECTAL SURGERY

## 2023-10-12 PROCEDURE — 99999 PR PBB SHADOW E&M-EST. PATIENT-LVL III: ICD-10-PCS | Mod: PBBFAC,,, | Performed by: COLON & RECTAL SURGERY

## 2023-10-12 PROCEDURE — 3008F PR BODY MASS INDEX (BMI) DOCUMENTED: ICD-10-PCS | Mod: CPTII,S$GLB,, | Performed by: COLON & RECTAL SURGERY

## 2023-10-12 PROCEDURE — 4010F PR ACE/ARB THEARPY RXD/TAKEN: ICD-10-PCS | Mod: CPTII,S$GLB,, | Performed by: COLON & RECTAL SURGERY

## 2023-10-12 PROCEDURE — 3008F BODY MASS INDEX DOCD: CPT | Mod: CPTII,S$GLB,, | Performed by: COLON & RECTAL SURGERY

## 2023-10-12 PROCEDURE — 1159F PR MEDICATION LIST DOCUMENTED IN MEDICAL RECORD: ICD-10-PCS | Mod: CPTII,S$GLB,, | Performed by: COLON & RECTAL SURGERY

## 2023-10-12 PROCEDURE — 3078F DIAST BP <80 MM HG: CPT | Mod: CPTII,S$GLB,, | Performed by: COLON & RECTAL SURGERY

## 2023-10-12 PROCEDURE — 1160F RVW MEDS BY RX/DR IN RCRD: CPT | Mod: CPTII,S$GLB,, | Performed by: COLON & RECTAL SURGERY

## 2023-10-12 PROCEDURE — 3078F PR MOST RECENT DIASTOLIC BLOOD PRESSURE < 80 MM HG: ICD-10-PCS | Mod: CPTII,S$GLB,, | Performed by: COLON & RECTAL SURGERY

## 2023-10-12 PROCEDURE — 3074F PR MOST RECENT SYSTOLIC BLOOD PRESSURE < 130 MM HG: ICD-10-PCS | Mod: CPTII,S$GLB,, | Performed by: COLON & RECTAL SURGERY

## 2023-10-13 ENCOUNTER — TELEPHONE (OUTPATIENT)
Dept: ENDOSCOPY | Facility: HOSPITAL | Age: 63
End: 2023-10-13
Payer: COMMERCIAL

## 2023-10-13 VITALS — HEIGHT: 64 IN | BODY MASS INDEX: 50.02 KG/M2 | WEIGHT: 293 LBS

## 2023-10-13 DIAGNOSIS — R93.3 ABNORMAL FINDING ON GI TRACT IMAGING: Primary | ICD-10-CM

## 2023-10-13 DIAGNOSIS — Z12.11 SCREEN FOR COLON CANCER: Primary | ICD-10-CM

## 2023-10-13 NOTE — TELEPHONE ENCOUNTER
Spoke to [facility] to schedule procedure(s) Colonoscopy       Physician to perform procedure(s) Dr. BAKRAI Whitman  Date of Procedure (s) 10/24/23  Arrival Time 6:00 AM  Time of Procedure(s) 7:00 AM   Location of Procedure(s) [facility] 2nd Floor  Type of Rx Prep sent to patient: PEG  Instructions provided to patient via Email    Patient was informed on the following information and verbalized understanding. Screening questionnaire reviewed with patient and complete. If procedure requires anesthesia, a responsible adult needs to be present to accompany the patient home, patient cannot drive after receiving anesthesia. Appointment details are tentative, especially check-in time. Patient will receive a prep-op call 4 days prior to confirm check-in time for procedure. If applicable the patient should contact their pharmacy to verify Rx for procedure prep is ready for pick-up. Patient was advised to call the scheduling department at [phone] if pharmacy states no Rx is available. Patient was advised to call the endoscopy scheduling department if any questions or concerns arise.      [facility]oscopy Scheduling Department

## 2023-10-13 NOTE — TELEPHONE ENCOUNTER
Dr. Whitman,  Pt is scheduled for Tuesday 10/24 @7am  on the 2nd floor for the 4 week colonoscopy  Thanks

## 2023-10-13 NOTE — TELEPHONE ENCOUNTER
----- Message from Robin Clement MA sent at 10/13/2023  2:00 PM CDT -----  Regarding: 10/24 BT  The patient is currently under an internal cardiologist Dr. Conway care and requires a blood thinner Eliquis (apixaban) for their upcoming scheduled Colonoscopy on 10/24/23.       Notes: urgent concern for caner  per dr Whitman

## 2023-10-13 NOTE — TELEPHONE ENCOUNTER
Dear TIMUR GLOVER,    Patient has a scheduled procedure Colonoscopy on 10/24/23 and is currently taking a blood thinner prescribed by your office. In order to ensure patient safety, we would like to confirm that the patient can place their blood thinner medication on hold for the procedure. Can he/she discontinue Eliquis (apixaban) for a minimum of 2 days prior to the procedure?     Thank you for your prompt reply.    [facility] Endoscopy Scheduling

## 2023-10-13 NOTE — TELEPHONE ENCOUNTER
"----- Message from Kat Sanchez sent at 10/13/2023  8:54 AM CDT -----    ----- Message -----  From: BAKARI Whitman MD  Sent: 10/12/2023   1:23 PM CDT  To: [facility] Patients    Procedure: Colonoscopy    Diagnosis: Abnormal finding on GI tract imaging.  Concern for cancer.     Procedure Timin-4 weeks    #If within 4 weeks selected, please kentrell as high priority#    #If greater than 12 weeks, please select "5-12 weeks" and delay sending until 3 months prior to requested date#     Provider: Myself    Location: No Preference    Additional Scheduling Information: Blood thinners ( Eliquis for a fib ablation)    Prep Specifications:Standard prep    Is the patient taking a GLP-1 Agonist:no    Have you attached a patient to this message: yes      Ok to do at 7am on a Tuesday morning if that is available.   Please let me know if we cannot get this done in the 4 weeks.    Thank you!    Kamlesh        "

## 2023-10-17 ENCOUNTER — TELEPHONE (OUTPATIENT)
Dept: ENDOSCOPY | Facility: HOSPITAL | Age: 63
End: 2023-10-17
Payer: COMMERCIAL

## 2023-10-24 ENCOUNTER — ANESTHESIA (OUTPATIENT)
Dept: ENDOSCOPY | Facility: HOSPITAL | Age: 63
End: 2023-10-24
Payer: COMMERCIAL

## 2023-10-24 ENCOUNTER — HOSPITAL ENCOUNTER (OUTPATIENT)
Facility: HOSPITAL | Age: 63
Discharge: HOME | End: 2023-10-24
Attending: COLON & RECTAL SURGERY | Admitting: COLON & RECTAL SURGERY
Payer: COMMERCIAL

## 2023-10-24 ENCOUNTER — ANESTHESIA EVENT (OUTPATIENT)
Dept: ENDOSCOPY | Facility: HOSPITAL | Age: 63
End: 2023-10-24
Payer: COMMERCIAL

## 2023-10-24 VITALS
HEART RATE: 45 BPM | BODY MASS INDEX: 50.02 KG/M2 | TEMPERATURE: 98 F | HEIGHT: 64 IN | OXYGEN SATURATION: 98 % | SYSTOLIC BLOOD PRESSURE: 161 MMHG | DIASTOLIC BLOOD PRESSURE: 72 MMHG | RESPIRATION RATE: 16 BRPM | WEIGHT: 293 LBS

## 2023-10-24 DIAGNOSIS — Z12.11 SCREENING FOR COLON CANCER: ICD-10-CM

## 2023-10-24 DIAGNOSIS — K57.20 DIVERTICULITIS OF LARGE INTESTINE WITH ABSCESS WITHOUT BLEEDING: Primary | ICD-10-CM

## 2023-10-24 DIAGNOSIS — R93.3 ABNORMAL CT SCAN, SIGMOID COLON: ICD-10-CM

## 2023-10-24 PROCEDURE — 27201089 HC SNARE, DISP (ANY): Performed by: COLON & RECTAL SURGERY

## 2023-10-24 PROCEDURE — 45385 COLONOSCOPY W/LESION REMOVAL: CPT | Performed by: COLON & RECTAL SURGERY

## 2023-10-24 PROCEDURE — 88305 TISSUE EXAM BY PATHOLOGIST: ICD-10-PCS | Mod: 26,,, | Performed by: STUDENT IN AN ORGANIZED HEALTH CARE EDUCATION/TRAINING PROGRAM

## 2023-10-24 PROCEDURE — 88305 TISSUE EXAM BY PATHOLOGIST: CPT | Performed by: STUDENT IN AN ORGANIZED HEALTH CARE EDUCATION/TRAINING PROGRAM

## 2023-10-24 PROCEDURE — 37000009 HC ANESTHESIA EA ADD 15 MINS: Performed by: COLON & RECTAL SURGERY

## 2023-10-24 PROCEDURE — 45385 COLONOSCOPY W/LESION REMOVAL: CPT | Mod: ,,, | Performed by: COLON & RECTAL SURGERY

## 2023-10-24 PROCEDURE — 88305 TISSUE EXAM BY PATHOLOGIST: CPT | Mod: 26,,, | Performed by: STUDENT IN AN ORGANIZED HEALTH CARE EDUCATION/TRAINING PROGRAM

## 2023-10-24 PROCEDURE — D9220A PRA ANESTHESIA: Mod: ANES,,, | Performed by: ANESTHESIOLOGY

## 2023-10-24 PROCEDURE — 63600175 PHARM REV CODE 636 W HCPCS: Performed by: ANESTHESIOLOGY

## 2023-10-24 PROCEDURE — 37000008 HC ANESTHESIA 1ST 15 MINUTES: Performed by: COLON & RECTAL SURGERY

## 2023-10-24 PROCEDURE — 25000003 PHARM REV CODE 250: Performed by: COLON & RECTAL SURGERY

## 2023-10-24 PROCEDURE — 45385 PR COLONOSCOPY,REMV LESN,SNARE: ICD-10-PCS | Mod: ,,, | Performed by: COLON & RECTAL SURGERY

## 2023-10-24 PROCEDURE — 63600175 PHARM REV CODE 636 W HCPCS: Performed by: STUDENT IN AN ORGANIZED HEALTH CARE EDUCATION/TRAINING PROGRAM

## 2023-10-24 PROCEDURE — D9220A PRA ANESTHESIA: ICD-10-PCS | Mod: CRNA,,, | Performed by: STUDENT IN AN ORGANIZED HEALTH CARE EDUCATION/TRAINING PROGRAM

## 2023-10-24 PROCEDURE — 25000003 PHARM REV CODE 250: Performed by: STUDENT IN AN ORGANIZED HEALTH CARE EDUCATION/TRAINING PROGRAM

## 2023-10-24 PROCEDURE — 27200997: Performed by: COLON & RECTAL SURGERY

## 2023-10-24 PROCEDURE — D9220A PRA ANESTHESIA: ICD-10-PCS | Mod: ANES,,, | Performed by: ANESTHESIOLOGY

## 2023-10-24 PROCEDURE — D9220A PRA ANESTHESIA: Mod: CRNA,,, | Performed by: STUDENT IN AN ORGANIZED HEALTH CARE EDUCATION/TRAINING PROGRAM

## 2023-10-24 RX ORDER — PROPOFOL 10 MG/ML
VIAL (ML) INTRAVENOUS CONTINUOUS PRN
Status: DISCONTINUED | OUTPATIENT
Start: 2023-10-24 | End: 2023-10-24

## 2023-10-24 RX ORDER — PROPOFOL 10 MG/ML
VIAL (ML) INTRAVENOUS
Status: DISCONTINUED | OUTPATIENT
Start: 2023-10-24 | End: 2023-10-24

## 2023-10-24 RX ORDER — SODIUM CHLORIDE 9 MG/ML
INJECTION, SOLUTION INTRAVENOUS CONTINUOUS
Status: DISCONTINUED | OUTPATIENT
Start: 2023-10-24 | End: 2023-10-24 | Stop reason: HOSPADM

## 2023-10-24 RX ORDER — LIDOCAINE HYDROCHLORIDE 20 MG/ML
INJECTION INTRAVENOUS
Status: DISCONTINUED | OUTPATIENT
Start: 2023-10-24 | End: 2023-10-24

## 2023-10-24 RX ORDER — ONDANSETRON 2 MG/ML
4 INJECTION INTRAMUSCULAR; INTRAVENOUS DAILY PRN
Status: DISCONTINUED | OUTPATIENT
Start: 2023-10-24 | End: 2023-10-24 | Stop reason: HOSPADM

## 2023-10-24 RX ADMIN — SODIUM CHLORIDE: 9 INJECTION, SOLUTION INTRAVENOUS at 06:10

## 2023-10-24 RX ADMIN — PROPOFOL 150 MCG/KG/MIN: 10 INJECTION, EMULSION INTRAVENOUS at 07:10

## 2023-10-24 RX ADMIN — LIDOCAINE HYDROCHLORIDE 100 MG: 20 INJECTION INTRAVENOUS at 07:10

## 2023-10-24 RX ADMIN — GLYCOPYRROLATE 0.2 MG: 0.2 INJECTION, SOLUTION INTRAMUSCULAR; INTRAVENOUS at 07:10

## 2023-10-24 RX ADMIN — Medication 60 MG: at 07:10

## 2023-10-24 RX ADMIN — ONDANSETRON 4 MG: 2 INJECTION INTRAMUSCULAR; INTRAVENOUS at 08:10

## 2023-10-24 RX ADMIN — Medication 20 MG: at 07:10

## 2023-10-24 NOTE — ANESTHESIA PREPROCEDURE EVALUATION
10/24/2023  Roxane Juniro is a 63 y.o., female   Patient Active Problem List   Diagnosis    Right knee injury    Bradycardia    Essential hypertension    Hyperthyroidism    BMI 50.0-59.9, adult    Morbid obesity    Atrial tachycardia    Persistent atrial fibrillation    Chronic sinusitis    Chronic heart failure with preserved ejection fraction (HFpEF)    Abnormal heart rate    Hypertension    Hypokalemia    Paroxysmal atrial fibrillation    PSVT (paroxysmal supraventricular tachycardia)    Asymptomatic bradycardia     .      Pre-op Assessment          Review of Systems  Cardiovascular:   EF 55%   Endocrine:  Morbid Obesity / BMI > 40      Physical Exam  General: Well nourished, Cooperative and Oriented    Dental:No active dental issues noted  Chest/Lungs:  Clear to auscultation    Heart:  Rate: Normal  Rhythm: Regular Rhythm  Sounds: Normal        Anesthesia Plan  Type of Anesthesia, risks & benefits discussed:    Anesthesia Type: Gen Natural Airway  Induction:  IV  Informed Consent: Informed consent signed with the Patient and all parties understand the risks and agree with anesthesia plan.  All questions answered.   ASA Score: 3  Anesthesia Plan Notes: Chart reviewed. Patient seen and examined. Anesthesia plan discussed and questions answered. E-consent signed.    Ready For Surgery From Anesthesia Perspective.     .

## 2023-10-24 NOTE — ANESTHESIA POSTPROCEDURE EVALUATION
Anesthesia Post Evaluation    Patient: Roxane Junior    Procedure(s) Performed: Procedure(s) (LRB):  COLONOSCOPY (N/A)    Final Anesthesia Type: general      Patient location during evaluation: [facility]  Patient participation: Yes- Able to Participate  Level of consciousness: awake  Post-procedure vital signs: reviewed and stable  Pain management: adequate  Airway patency: patent    PONV status at discharge: No PONV  Anesthetic complications: no      Cardiovascular status: blood pressure returned to baseline and stable  Respiratory status: unassisted, room air and spontaneous ventilation  Hydration status: euvolemic  Follow-up not needed.          Vitals Value Taken Time   /70 10/24/23 0833   Temp 36.4 °C (97.5 °F) 10/24/23 0845   Pulse 45 10/24/23 0845   Resp 16 10/24/23 0845   SpO2 98 % 10/24/23 0845   Vitals shown include unvalidated device data.      No case tracking events are documented in the log.      Pain/Chris Score: Chris Score: 10 (10/24/2023  7:56 AM)

## 2023-10-24 NOTE — PROVATION PATIENT INSTRUCTIONS
Discharge Summary/Instructions after an Endoscopic Procedure  Patient Name: Roxane Junior  Patient MRN: [id number]  Patient YOB: 1960 Tuesday, October 24, 2023  Stephen Whitman MD  Dear patient,  As a result of recent federal legislation (The Federal Cures Act), you may   receive lab or pathology results from your procedure in your [facility]   account before your physician is able to contact you. Your physician or   their representative will relay the results to you with their   recommendations at their soonest availability.  Thank you,  RESTRICTIONS:  During your procedure today, you received medications for sedation.  These   medications may affect your judgment, balance and coordination.  Therefore,   for 24 hours, you have the following restrictions:   - DO NOT drive a car, operate machinery, make legal/financial decisions,   sign important papers or drink alcohol.    ACTIVITY:  Today: no heavy lifting, straining or running due to procedural   sedation/anesthesia.  The following day: return to full activity including work.  DIET:  Eat and drink normally unless instructed otherwise.     TREATMENT FOR COMMON SIDE EFFECTS:  - Mild abdominal pain, nausea, belching, bloating or excessive gas:  rest,   eat lightly and use a heating pad.  - Sore Throat: treat with throat lozenges and/or gargle with warm salt   water.  - Because air was used during the procedure, expelling large amounts of air   from your rectum or belching is normal.  - If a bowel prep was taken, you may not have a bowel movement for 1-3 days.    This is normal.  SYMPTOMS TO WATCH FOR AND REPORT TO YOUR PHYSICIAN:  1. Abdominal pain or bloating, other than gas cramps.  2. Chest pain.  3. Back pain.  4. Signs of infection such as: chills or fever occurring within 24 hours   after the procedure.  5. Rectal bleeding, which would show as bright red, maroon, or black stools.   (A tablespoon of blood from the rectum is not serious, especially  if   hemorrhoids are present.)  6. Vomiting.  7. Weakness or dizziness.  GO DIRECTLY TO THE NEAREST EMERGENCY ROOM IF YOU HAVE ANY OF THE FOLLOWING:      Difficulty breathing              Chills and/or fever over 101 F   Persistent vomiting and/or vomiting blood   Severe abdominal pain   Severe chest pain   Black, tarry stools   Bleeding- more than one tablespoon   Any other symptom or condition that you feel may need urgent attention  Your doctor recommends these additional instructions:  If any biopsies were taken, your doctors clinic will contact you in 1 to 2   weeks with any results.  - Discharge patient to home (ambulatory).   - Resume previous diet.   - Resume Eliquis (apixaban) at prior dose tomorrow.   - Continue present medications.   - Await pathology results.   - Repeat colonoscopy in 5 years for surveillance.  For questions, problems or results please call your physician - Stephen Whitman MD at Work:  [phone].  [facility] [place], EMERGENCY ROOM PHONE NUMBER: [phone]  IF A COMPLICATION OR EMERGENCY SITUATION ARISES AND YOU ARE UNABLE TO REACH   YOUR PHYSICIAN - GO DIRECTLY TO THE EMERGENCY ROOM.  Stephen Whitman MD  10/24/2023 7:39:02 AM  This report has been verified and signed electronically.  Dear patient,  As a result of recent federal legislation (The Federal Cures Act), you may   receive lab or pathology results from your procedure in your [facility]   account before your physician is able to contact you. Your physician or   their representative will relay the results to you with their   recommendations at their soonest availability.  Thank you,  PROVATION

## 2023-10-24 NOTE — TRANSFER OF CARE
"Anesthesia Transfer of Care Note    Patient: Roxane Junior    Procedure(s) Performed: Procedure(s) (LRB):  COLONOSCOPY (N/A)    Patient location: [facility]    Anesthesia Type: general    Transport from OR: Transported from OR on 6-10 L/min O2 by face mask with adequate spontaneous ventilation    Post pain: adequate analgesia    Post assessment: no apparent anesthetic complications and tolerated procedure well    Post vital signs: stable    Level of consciousness: sedated and responds to stimulation    Nausea/Vomiting: no nausea/vomiting    Complications: none    Transfer of care protocol was followed      Last vitals:   Visit Vitals  /65 (Patient Position: Lying)   Pulse (!) 53   Temp 36.7 °C (98 °F) (Temporal)   Resp 16   Ht 5' 4" (1.626 m)   Wt 136.1 kg (300 lb)   SpO2 99%   Breastfeeding No   BMI 51.49 kg/m²     "

## 2023-10-24 NOTE — PROGRESS NOTES
Discharge instructions reviewed w/pt and friend, verbalized understanding. Pt in NADN.No complaints at this time. Tolerated liquids w/ no issues. To be d/c'd in wheelchair to friend.

## 2023-10-25 ENCOUNTER — TELEPHONE (OUTPATIENT)
Dept: ELECTROPHYSIOLOGY | Facility: CLINIC | Age: 63
End: 2023-10-25
Payer: COMMERCIAL

## 2023-10-26 LAB
FINAL PATHOLOGIC DIAGNOSIS: NORMAL
GROSS: NORMAL
Lab: NORMAL
MICROSCOPIC EXAM: NORMAL

## 2023-10-31 ENCOUNTER — HOSPITAL ENCOUNTER (OUTPATIENT)
Dept: RADIOLOGY | Facility: HOSPITAL | Age: 63
Discharge: HOME | End: 2023-10-31
Attending: COLON & RECTAL SURGERY
Payer: COMMERCIAL

## 2023-10-31 LAB
CREAT SERPL-MCNC: 0.8 MG/DL (ref 0.5–1.4)
SAMPLE: NORMAL

## 2023-10-31 PROCEDURE — A9698 NON-RAD CONTRAST MATERIALNOC: HCPCS | Performed by: COLON & RECTAL SURGERY

## 2023-10-31 PROCEDURE — 25500020 PHARM REV CODE 255: Performed by: COLON & RECTAL SURGERY

## 2023-10-31 PROCEDURE — 74177 CT ABDOMEN PELVIS WITH CONTRAST: ICD-10-PCS | Mod: 26,,, | Performed by: STUDENT IN AN ORGANIZED HEALTH CARE EDUCATION/TRAINING PROGRAM

## 2023-10-31 PROCEDURE — 74177 CT ABD & PELVIS W/CONTRAST: CPT | Mod: 26,,, | Performed by: STUDENT IN AN ORGANIZED HEALTH CARE EDUCATION/TRAINING PROGRAM

## 2023-10-31 PROCEDURE — 74177 CT ABD & PELVIS W/CONTRAST: CPT | Mod: TC

## 2023-10-31 RX ADMIN — BARIUM SULFATE 450 ML: 20 SUSPENSION ORAL at 06:10

## 2023-10-31 RX ADMIN — IOHEXOL 100 ML: 350 INJECTION, SOLUTION INTRAVENOUS at 06:10

## 2023-11-05 DIAGNOSIS — I48.91 NEW ONSET ATRIAL FIBRILLATION: ICD-10-CM

## 2023-11-06 RX ORDER — APIXABAN 5 MG/1
5 TABLET, FILM COATED ORAL 2 TIMES DAILY
Qty: 60 TABLET | Refills: 11 | Status: SHIPPED | OUTPATIENT
Start: 2023-11-06

## 2024-03-05 ENCOUNTER — OFFICE VISIT (OUTPATIENT)
Dept: CARDIOLOGY | Facility: CLINIC | Age: 64
End: 2024-03-05
Payer: COMMERCIAL

## 2024-03-05 VITALS
HEART RATE: 46 BPM | BODY MASS INDEX: 57.99 KG/M2 | OXYGEN SATURATION: 99 % | WEIGHT: 293 LBS | SYSTOLIC BLOOD PRESSURE: 122 MMHG | DIASTOLIC BLOOD PRESSURE: 86 MMHG

## 2024-03-05 DIAGNOSIS — R00.1 BRADYCARDIA: ICD-10-CM

## 2024-03-05 DIAGNOSIS — I48.19 PERSISTENT ATRIAL FIBRILLATION: ICD-10-CM

## 2024-03-05 DIAGNOSIS — I10 ESSENTIAL HYPERTENSION: Primary | ICD-10-CM

## 2024-03-05 PROCEDURE — 99999 PR PBB SHADOW E&M-EST. PATIENT-LVL III: CPT | Mod: PBBFAC,,, | Performed by: INTERNAL MEDICINE

## 2024-03-05 PROCEDURE — 1159F MED LIST DOCD IN RCRD: CPT | Mod: CPTII,S$GLB,, | Performed by: INTERNAL MEDICINE

## 2024-03-05 PROCEDURE — 3074F SYST BP LT 130 MM HG: CPT | Mod: CPTII,S$GLB,, | Performed by: INTERNAL MEDICINE

## 2024-03-05 PROCEDURE — 3079F DIAST BP 80-89 MM HG: CPT | Mod: CPTII,S$GLB,, | Performed by: INTERNAL MEDICINE

## 2024-03-05 PROCEDURE — 3008F BODY MASS INDEX DOCD: CPT | Mod: CPTII,S$GLB,, | Performed by: INTERNAL MEDICINE

## 2024-03-05 PROCEDURE — 99214 OFFICE O/P EST MOD 30 MIN: CPT | Mod: S$GLB,,, | Performed by: INTERNAL MEDICINE

## 2024-03-05 NOTE — PROGRESS NOTES
Cardiology    3/5/2024  2:43 PM    Problem list  Patient Active Problem List   Diagnosis    Right knee injury    Bradycardia    Essential hypertension    Hyperthyroidism    BMI 50.0-59.9, adult    Morbid obesity    Atrial tachycardia    Persistent atrial fibrillation    Chronic sinusitis    Chronic heart failure with preserved ejection fraction (HFpEF)    Abnormal heart rate    Hypertension    Hypokalemia    Paroxysmal atrial fibrillation    PSVT (paroxysmal supraventricular tachycardia)    Asymptomatic bradycardia       CC:  Follow-up    HPI:  Patient has been doing well.  She denies any chest pain, shortness of breath, palpitation, syncope.  She denies any bleeding.  She is tolerating her medications.    Medications  Current Outpatient Medications   Medication Sig Dispense Refill    amLODIPine (NORVASC) 5 MG tablet Take 1 tablet (5 mg total) by mouth once daily. 30 tablet 11    apixaban (ELIQUIS) 5 mg Tab Take 1 tablet by mouth twice daily 60 tablet 11    hydroCHLOROthiazide (HYDRODIURIL) 12.5 MG Tab Take 1 tablet (12.5 mg total) by mouth once daily. 30 tablet 11    losartan (COZAAR) 100 MG tablet Take 1 tablet (100 mg total) by mouth once daily. 90 tablet 3    methIMAzole (TAPAZOLE) 5 MG Tab Take 5 mg by mouth once daily.      potassium chloride SA (K-DUR,KLOR-CON) 20 MEQ tablet Take 1 tablet (20 mEq total) by mouth once daily. 90 tablet 3     No current facility-administered medications for this visit.      Prior to Admission medications    Medication Sig Start Date End Date Taking? Authorizing Provider   amLODIPine (NORVASC) 5 MG tablet Take 1 tablet (5 mg total) by mouth once daily. 8/16/23  Yes Sobeida Henriquez NP   apixaban (ELIQUIS) 5 mg Tab Take 1 tablet by mouth twice daily 11/6/23  Yes Prabhu Andrade MD   hydroCHLOROthiazide (HYDRODIURIL) 12.5 MG Tab Take 1 tablet (12.5 mg total) by mouth once daily. 6/22/23 6/21/24 Yes Melba Miranda PA-C   losartan (COZAAR) 100 MG tablet Take 1 tablet (100  mg total) by mouth once daily. 23 Yes Montana Koehler MD   methIMAzole (TAPAZOLE) 5 MG Tab Take 5 mg by mouth once daily.   Yes Provider, Historical   potassium chloride SA (K-DUR,KLOR-CON) 20 MEQ tablet Take 1 tablet (20 mEq total) by mouth once daily. 23 Yes Melba Miranda PA-C         History  Past Medical History:   Diagnosis Date    Atrial fibrillation     Hypertension     Hyperthyroidism     Visit for monitoring Tikosyn therapy 2022     Past Surgical History:   Procedure Laterality Date    ABLATION OF ARRHYTHMOGENIC FOCUS FOR ATRIAL FIBRILLATION N/A 2023    Procedure: Ablation atrial fibrillation;  Surgeon: Prabhu Andrade MD;  Location: [facility] EP LAB;  Service: Cardiology;  Laterality: N/A;  AF, MIRIAM (Cx if SR), PVI, RFA, Carto, Gen, OK, 3 Prep    COLONOSCOPY N/A 10/24/2023    Procedure: COLONOSCOPY;  Surgeon: BAKARI Ma MD;  Location: [facility] (2ND FLR);  Service: Endoscopy;  Laterality: N/A;  PER DR. MA TO ADD  Ref By: J Carlos  Procedure:Colonscopy  Diagnosis:Abnormal finding on GI tract imaging /concern for cancer  Procedure Timin-4 weeks  Provider: J Carlos  BT:ok to hold Eliquis 2 days per Dr Andrade-  [place] [facility]  Prep Specification    ECHOCARDIOGRAM,TRANSESOPHAGEAL N/A 2023    Procedure: Transesophageal echo (MIRIAM) intra-procedure log documentation;  Surgeon: Kunal Jacobsen MD;  Location: [facility] EP LAB;  Service: Cardiology;  Laterality: N/A;    TREATMENT OF CARDIAC ARRHYTHMIA N/A 2022    Procedure: CARDIOVERSION;  Surgeon: Prabhu Andrade MD;  Location: [facility] EP LAB;  Service: Cardiology;  Laterality: N/A;  afib, MIRIAM( Cx if pt in SR)  DCCV, anes, OK, 3 Prep *Tikosyn Admit post procedure*    TREATMENT OF CARDIAC ARRHYTHMIA  2023    Procedure: Cardioversion or Defibrillation;  Surgeon: Prabhu Andrade MD;  Location: [facility] EP LAB;  Service: Cardiology;;    TUBAL LIGATION       Social History     Socioeconomic History     Marital status: Single   Tobacco Use    Smoking status: Never    Smokeless tobacco: Never   Substance and Sexual Activity    Alcohol use: Yes     Comment: occ    Drug use: Never         Allergies  Review of patient's allergies indicates:  No Known Allergies      Review of Systems   Review of Systems   Constitutional: Negative for decreased appetite, fever and weight loss.   HENT:  Negative for congestion and nosebleeds.    Eyes:  Negative for double vision, vision loss in left eye, vision loss in right eye and visual disturbance.   Cardiovascular:  Negative for chest pain, claudication, cyanosis, dyspnea on exertion, irregular heartbeat, leg swelling, near-syncope, orthopnea, palpitations, paroxysmal nocturnal dyspnea and syncope.   Respiratory:  Negative for cough, hemoptysis, shortness of breath, sleep disturbances due to breathing, snoring, sputum production and wheezing.    Endocrine: Negative for cold intolerance and heat intolerance.   Skin:  Negative for nail changes and rash.   Musculoskeletal:  Negative for joint pain, muscle cramps, muscle weakness and myalgias.   Gastrointestinal:  Negative for change in bowel habit, heartburn, hematemesis, hematochezia, hemorrhoids and melena.   Neurological:  Negative for dizziness, focal weakness and headaches.         Physical Exam  Wt Readings from Last 1 Encounters:   03/05/24 (!) 153.3 kg (337 lb 13.7 oz)     BP Readings from Last 3 Encounters:   03/05/24 122/86   10/24/23 (!) 161/72   10/12/23 120/78     Pulse Readings from Last 1 Encounters:   03/05/24 (!) 46     Body mass index is 57.99 kg/m².    Physical Exam  Vitals reviewed.   Constitutional:       Appearance: She is well-developed. She is obese.   HENT:      Head: Atraumatic.   Eyes:      General: No scleral icterus.  Neck:      Vascular: Normal carotid pulses. No carotid bruit, hepatojugular reflux or JVD.   Cardiovascular:      Rate and Rhythm: Regular rhythm. Bradycardia present.      Chest Wall: PMI is  not displaced.      Pulses: Intact distal pulses.           Carotid pulses are 2+ on the right side and 2+ on the left side.       Radial pulses are 2+ on the right side and 2+ on the left side.        Dorsalis pedis pulses are 2+ on the right side and 2+ on the left side.      Heart sounds: S1 normal and S2 normal. Murmur heard.      Early systolic murmur is present with a grade of 2/6 at the upper right sternal border.      No friction rub.   Pulmonary:      Effort: Pulmonary effort is normal. No respiratory distress.      Breath sounds: Normal breath sounds. No stridor. No wheezing or rales.   Chest:      Chest wall: No tenderness.   Abdominal:      General: Bowel sounds are normal.      Palpations: Abdomen is soft.   Musculoskeletal:      Cervical back: Neck supple. No edema.      Right lower leg: No edema.      Left lower leg: No edema.   Skin:     General: Skin is warm and dry.      Nails: There is no clubbing.   Neurological:      Mental Status: She is alert and oriented to person, place, and time.   Psychiatric:         Behavior: Behavior normal.         Thought Content: Thought content normal.             Assessment  1. Essential hypertension  Well controlled on current medications    2. Bradycardia  Stable, asymptomatic    3. Persistent atrial fibrillation  Stable, on apixaban        Plan and Discussion  Discussed her blood pressure is well controlled.  Recommend to continue with current medications.  Reviewed her labs from September and December.    Follow Up  6 months      [place] Koehler MD, F.A.C.C, F.S.C.A.I.      Total professional time spent for the encounter: 30 minutes  Time was spent preparing to see the patient, reviewing results of prior testing, obtaining and/or reviewing separately obtained history, performing a medically appropriate examination and interview, counseling and educating the patient/family, ordering medications/tests/procedures, referring and communicating with other health  care professionals, documenting clinical information in the electronic health record, and independently interpreting results.    Disclaimer: This document was created using voice recognition software ([employer]). Although it may be edited, this document may contain errors related to incorrect recognition of the spoken word. Please call the physician if clarification is needed.

## 2024-03-08 ENCOUNTER — TELEPHONE (OUTPATIENT)
Dept: ELECTROPHYSIOLOGY | Facility: CLINIC | Age: 64
End: 2024-03-08
Payer: COMMERCIAL

## 2024-03-08 NOTE — TELEPHONE ENCOUNTER
----- Message from Sarah Lamb MA sent at 3/7/2024  4:29 PM CST -----  Regarding: RE: call back    ----- Message -----  From: Shell Watson  Sent: 3/7/2024   3:55 PM CST  To: [facility] Staff  Subject: call back                                        Type: Patient Call Back    Who called: p/t     What is the request in detail: p/t is calling to see if she could get another brand of medicine besides apixaban (ELIQUIS) 5 mg Tab because she states it is too expensive. She states to get it sent to [phone] [place] [address]   Phone: [phone]  Fax: [phone]      Please call to assist.     Can the clinic reply by [facility]?    Would the patient rather a call back or a response via [facility]?     Best call back number: [phone]    Additional Information:

## 2024-03-08 NOTE — TELEPHONE ENCOUNTER
Spoke with patient who states that she was able to call her insurance and was notified that she just has to meet her deductible of $290 and the cost of her Eliquis will go back down to $40/ month. Denies needing any further assistance at this time.

## 2024-06-17 RX ORDER — LOSARTAN POTASSIUM 100 MG/1
100 TABLET ORAL
Qty: 90 TABLET | Refills: 3 | Status: SHIPPED | OUTPATIENT
Start: 2024-06-17

## 2024-07-05 DIAGNOSIS — I10 ESSENTIAL HYPERTENSION: ICD-10-CM

## 2024-07-05 RX ORDER — AMLODIPINE BESYLATE 5 MG/1
5 TABLET ORAL
Qty: 90 TABLET | Refills: 0 | Status: SHIPPED | OUTPATIENT
Start: 2024-07-05

## 2024-07-11 DIAGNOSIS — I10 ESSENTIAL HYPERTENSION: ICD-10-CM

## 2024-07-11 RX ORDER — AMLODIPINE BESYLATE 5 MG/1
5 TABLET ORAL
Qty: 30 TABLET | Refills: 0 | Status: SHIPPED | OUTPATIENT
Start: 2024-07-11

## 2024-08-20 ENCOUNTER — OFFICE VISIT (OUTPATIENT)
Dept: CARDIOLOGY | Facility: CLINIC | Age: 64
End: 2024-08-20
Payer: COMMERCIAL

## 2024-08-20 VITALS
HEART RATE: 54 BPM | OXYGEN SATURATION: 97 % | DIASTOLIC BLOOD PRESSURE: 88 MMHG | BODY MASS INDEX: 56.88 KG/M2 | WEIGHT: 293 LBS | SYSTOLIC BLOOD PRESSURE: 122 MMHG

## 2024-08-20 DIAGNOSIS — E05.90 HYPERTHYROIDISM: ICD-10-CM

## 2024-08-20 DIAGNOSIS — I10 ESSENTIAL HYPERTENSION: ICD-10-CM

## 2024-08-20 DIAGNOSIS — R00.1 BRADYCARDIA: ICD-10-CM

## 2024-08-20 DIAGNOSIS — I48.19 PERSISTENT ATRIAL FIBRILLATION: Primary | ICD-10-CM

## 2024-08-20 PROCEDURE — 4010F ACE/ARB THERAPY RXD/TAKEN: CPT | Mod: CPTII,S$GLB,, | Performed by: INTERNAL MEDICINE

## 2024-08-20 PROCEDURE — 3008F BODY MASS INDEX DOCD: CPT | Mod: CPTII,S$GLB,, | Performed by: INTERNAL MEDICINE

## 2024-08-20 PROCEDURE — 3074F SYST BP LT 130 MM HG: CPT | Mod: CPTII,S$GLB,, | Performed by: INTERNAL MEDICINE

## 2024-08-20 PROCEDURE — 1159F MED LIST DOCD IN RCRD: CPT | Mod: CPTII,S$GLB,, | Performed by: INTERNAL MEDICINE

## 2024-08-20 PROCEDURE — 3079F DIAST BP 80-89 MM HG: CPT | Mod: CPTII,S$GLB,, | Performed by: INTERNAL MEDICINE

## 2024-08-20 PROCEDURE — 99214 OFFICE O/P EST MOD 30 MIN: CPT | Mod: S$GLB,,, | Performed by: INTERNAL MEDICINE

## 2024-08-20 PROCEDURE — 99999 PR PBB SHADOW E&M-EST. PATIENT-LVL III: CPT | Mod: PBBFAC,,, | Performed by: INTERNAL MEDICINE

## 2024-08-20 NOTE — PROGRESS NOTES
Cardiology    8/20/2024  2:35 PM    Problem list  Patient Active Problem List   Diagnosis    Right knee injury    Bradycardia    Essential hypertension    Hyperthyroidism    BMI 50.0-59.9, adult    Morbid obesity    Atrial tachycardia    Persistent atrial fibrillation    Chronic sinusitis    Chronic heart failure with preserved ejection fraction (HFpEF)    Abnormal heart rate    Hypertension    Hypokalemia    Paroxysmal atrial fibrillation    PSVT (paroxysmal supraventricular tachycardia)    Asymptomatic bradycardia       CC:  Follow-up    HPI:  Patient has been doing well.  She denies any chest pain, shortness of breath, palpitation, syncope, dizziness.  She denies any bleeding.  She is tolerating her medications.    Medications  Current Outpatient Medications   Medication Sig Dispense Refill    amLODIPine (NORVASC) 5 MG tablet Take 1 tablet by mouth once daily 30 tablet 0    apixaban (ELIQUIS) 5 mg Tab Take 1 tablet by mouth twice daily 60 tablet 11    losartan (COZAAR) 100 MG tablet Take 1 tablet by mouth once daily 90 tablet 3    methIMAzole (TAPAZOLE) 5 MG Tab Take 5 mg by mouth once daily.      hydroCHLOROthiazide (HYDRODIURIL) 12.5 MG Tab Take 1 tablet (12.5 mg total) by mouth once daily. 30 tablet 11     No current facility-administered medications for this visit.      Prior to Admission medications    Medication Sig Start Date End Date Taking? Authorizing Provider   amLODIPine (NORVASC) 5 MG tablet Take 1 tablet (5 mg total) by mouth once daily. 8/16/23  Yes Sobeida Henriquez NP   apixaban (ELIQUIS) 5 mg Tab Take 1 tablet by mouth twice daily 11/6/23  Yes Prabhu Andrade MD   hydroCHLOROthiazide (HYDRODIURIL) 12.5 MG Tab Take 1 tablet (12.5 mg total) by mouth once daily. 6/22/23 6/21/24 Yes Melba Miranda PA-C   losartan (COZAAR) 100 MG tablet Take 1 tablet (100 mg total) by mouth once daily. 6/19/23 6/18/24 Yes Montana Koehler MD   methIMAzole (TAPAZOLE) 5 MG Tab Take 5 mg by mouth once daily.    Yes Provider, Historical   potassium chloride SA (K-DUR,KLOR-CON) 20 MEQ tablet Take 1 tablet (20 mEq total) by mouth once daily. 23 Yes Melba Miranda PA-C         History  Past Medical History:   Diagnosis Date    Atrial fibrillation     Hypertension     Hyperthyroidism     Visit for monitoring Tikosyn therapy 2022     Past Surgical History:   Procedure Laterality Date    ABLATION OF ARRHYTHMOGENIC FOCUS FOR ATRIAL FIBRILLATION N/A 2023    Procedure: Ablation atrial fibrillation;  Surgeon: Prabhu Andrade MD;  Location: [facility] EP LAB;  Service: Cardiology;  Laterality: N/A;  AF, MIRIAM (Cx if SR), PVI, RFA, Carto, Gen, NE, 3 Prep    COLONOSCOPY N/A 10/24/2023    Procedure: COLONOSCOPY;  Surgeon: BAKARI Ma MD;  Location: [facility] (2ND FLR);  Service: Endoscopy;  Laterality: N/A;  PER DR. MA TO ADD  Ref By: J Carlos  Procedure:Colonscopy  Diagnosis:Abnormal finding on GI tract imaging /concern for cancer  Procedure Timin-4 weeks  Provider: J Carlos  BT:ok to hold Eliquis 2 days per Dr Andrade-GT  Location: [facility] 2-Endo  Prep Specification    ECHOCARDIOGRAM,TRANSESOPHAGEAL N/A 2023    Procedure: Transesophageal echo (MIRIAM) intra-procedure log documentation;  Surgeon: Kunal Jacobsen MD;  Location: [facility] EP LAB;  Service: Cardiology;  Laterality: N/A;    TREATMENT OF CARDIAC ARRHYTHMIA N/A 2022    Procedure: CARDIOVERSION;  Surgeon: Prabhu Andrade MD;  Location: [facility] EP LAB;  Service: Cardiology;  Laterality: N/A;  afib, MIRIAM( Cx if pt in SR)  DCCV, anes, NE, 3 Prep *Tikosyn Admit post procedure*    TREATMENT OF CARDIAC ARRHYTHMIA  2023    Procedure: Cardioversion or Defibrillation;  Surgeon: Prabhu Andrade MD;  Location: [facility] EP LAB;  Service: Cardiology;;    TUBAL LIGATION       Social History     Socioeconomic History    Marital status: Single   Tobacco Use    Smoking status: Never    Smokeless tobacco: Never   Substance and Sexual Activity    Alcohol use: Yes      Comment: occ    Drug use: Never     Social Determinants of Health     Financial Resource Strain: Low Risk  (8/17/2021)    Received from [facility]    Overall Financial Resource Strain (CARDIA)     Difficulty of Paying Living Expenses: Not hard at all   Food Insecurity: No Food Insecurity (8/17/2021)    Received from [facility]    Hunger Vital Sign     Worried About Running Out of Food in the Last Year: Never true     Ran Out of Food in the Last Year: Never true   Transportation Needs: No Transportation Needs (8/17/2021)    Received from [facility]    PRAPARE - Transportation     Lack of Transportation (Medical): No     Lack of Transportation (Non-Medical): No   Physical Activity: Insufficiently Active (8/17/2021)    Received from [facility]    Exercise Vital Sign     Days of Exercise per Week: 5 days     Minutes of Exercise per Session: 20 min   Stress: No Stress Concern Present (8/17/2021)    Received from [facility]    [employer] [place] [employer] - Occupational Stress Questionnaire     Feeling of Stress : Not at all   Housing Stability: Unknown (8/17/2021)    Received from [facility]    Housing Stability Vital Sign     Unable to Pay for Housing in the Last Year: No     In the last 12 months, was there a time when you did not have a steady place to sleep or slept in a shelter (including now)?: No         Allergies  Review of patient's allergies indicates:  No Known Allergies      Review of Systems   Review of Systems   Constitutional: Negative for decreased appetite, fever and weight loss.   HENT:  Negative for congestion and nosebleeds.    Eyes:  Negative for double vision, vision loss in left eye, vision loss in right eye and visual disturbance.   Cardiovascular:  Negative for chest pain, claudication, cyanosis, dyspnea on exertion, irregular heartbeat, leg swelling, near-syncope, orthopnea, palpitations,  paroxysmal nocturnal dyspnea and syncope.   Respiratory:  Negative for cough, hemoptysis, shortness of breath, sleep disturbances due to breathing, snoring, sputum production and wheezing.    Endocrine: Negative for cold intolerance and heat intolerance.   Skin:  Negative for nail changes and rash.   Musculoskeletal:  Negative for joint pain, muscle cramps, muscle weakness and myalgias.   Gastrointestinal:  Negative for change in bowel habit, heartburn, hematemesis, hematochezia, hemorrhoids and melena.   Neurological:  Negative for dizziness, focal weakness and headaches.         Physical Exam  Wt Readings from Last 1 Encounters:   08/20/24 (!) 150.3 kg (331 lb 5.6 oz)     BP Readings from Last 3 Encounters:   08/20/24 122/88   03/05/24 122/86   10/24/23 (!) 161/72     Pulse Readings from Last 1 Encounters:   08/20/24 (!) 54     Body mass index is 56.88 kg/m².    Physical Exam  Vitals reviewed.   Constitutional:       Appearance: She is well-developed. She is obese.   HENT:      Head: Atraumatic.   Eyes:      General: No scleral icterus.  Neck:      Vascular: Normal carotid pulses. No carotid bruit, hepatojugular reflux or JVD.   Cardiovascular:      Rate and Rhythm: Regular rhythm. Bradycardia present.      Chest Wall: PMI is not displaced.      Pulses: Intact distal pulses.           Carotid pulses are 2+ on the right side and 2+ on the left side.       Radial pulses are 2+ on the right side and 2+ on the left side.        Dorsalis pedis pulses are 2+ on the right side and 2+ on the left side.      Heart sounds: S1 normal and S2 normal. Murmur heard.      Early systolic murmur is present with a grade of 2/6 at the upper right sternal border.      No friction rub.   Pulmonary:      Effort: Pulmonary effort is normal. No respiratory distress.      Breath sounds: Normal breath sounds. No stridor. No wheezing or rales.   Chest:      Chest wall: No tenderness.   Abdominal:      General: Bowel sounds are normal.       Palpations: Abdomen is soft.   Musculoskeletal:      Cervical back: Neck supple. No edema.      Right lower leg: No edema.      Left lower leg: No edema.   Skin:     General: Skin is warm and dry.      Nails: There is no clubbing.   Neurological:      Mental Status: She is alert and oriented to person, place, and time.   Psychiatric:         Behavior: Behavior normal.         Thought Content: Thought content normal.             Assessment  1. Essential hypertension  Well controlled on current medications    2. Bradycardia  Stable, asymptomatic, not on any medications that affect HR.    3. Persistent atrial fibrillation  Stable, on apixaban        Plan and Discussion  Discussed her blood pressure is well controlled.  Recommend to continue with current medications.      Follow Up  6 months w livier Koehler MD, F.A.C.C, F.S.C.A.I.      Total professional time spent for the encounter: 30 minutes  Time was spent preparing to see the patient, reviewing results of prior testing, obtaining and/or reviewing separately obtained history, performing a medically appropriate examination and interview, counseling and educating the patient/family, ordering medications/tests/procedures, referring and communicating with other health care professionals, documenting clinical information in the electronic health record, and independently interpreting results.    Disclaimer: This document was created using voice recognition software (M*Modal Fluency Direct). Although it may be edited, this document may contain errors related to incorrect recognition of the spoken word. Please call the physician if clarification is needed.

## 2024-09-17 LAB
ALBUMIN SERPL-MCNC: 3.9 G/DL (ref 3.6–5.1)
ALBUMIN/GLOB SERPL: 1.1 (CALC) (ref 1–2.5)
ALP SERPL-CCNC: 66 U/L (ref 37–153)
ALT SERPL-CCNC: 12 U/L (ref 6–29)
AST SERPL-CCNC: 16 U/L (ref 10–35)
BILIRUB SERPL-MCNC: 0.7 MG/DL (ref 0.2–1.2)
BUN SERPL-MCNC: 18 MG/DL (ref 7–25)
BUN/CREAT SERPL: 15 (CALC) (ref 6–22)
CALCIUM SERPL-MCNC: 9.4 MG/DL (ref 8.6–10.4)
CHLORIDE SERPL-SCNC: 106 MMOL/L (ref 98–110)
CO2 SERPL-SCNC: 28 MMOL/L (ref 20–32)
CREAT SERPL-MCNC: 1.19 MG/DL (ref 0.5–1.05)
EGFR: 51 ML/MIN/1.73M2
ERYTHROCYTE [DISTWIDTH] IN BLOOD BY AUTOMATED COUNT: 13.8 % (ref 11–15)
GLOBULIN SER CALC-MCNC: 3.4 G/DL (CALC) (ref 1.9–3.7)
GLUCOSE SERPL-MCNC: 93 MG/DL (ref 65–99)
HCT VFR BLD AUTO: 42.3 % (ref 35–45)
HGB BLD-MCNC: 13.4 G/DL (ref 11.7–15.5)
MCH RBC QN AUTO: 29.7 PG (ref 27–33)
MCHC RBC AUTO-ENTMCNC: 31.7 G/DL (ref 32–36)
MCV RBC AUTO: 93.8 FL (ref 80–100)
PLATELET # BLD AUTO: 214 THOUSAND/UL (ref 140–400)
PMV BLD REES-ECKER: 12.2 FL (ref 7.5–12.5)
POTASSIUM SERPL-SCNC: 3.8 MMOL/L (ref 3.5–5.3)
PROT SERPL-MCNC: 7.3 G/DL (ref 6.1–8.1)
RBC # BLD AUTO: 4.51 MILLION/UL (ref 3.8–5.1)
SODIUM SERPL-SCNC: 143 MMOL/L (ref 135–146)
WBC # BLD AUTO: 4.8 THOUSAND/UL (ref 3.8–10.8)

## 2024-10-31 DIAGNOSIS — I10 ESSENTIAL HYPERTENSION: ICD-10-CM

## 2024-11-01 RX ORDER — AMLODIPINE BESYLATE 5 MG/1
5 TABLET ORAL
Qty: 30 TABLET | Refills: 0 | Status: SHIPPED | OUTPATIENT
Start: 2024-11-01

## 2024-11-17 DIAGNOSIS — I48.91 NEW ONSET ATRIAL FIBRILLATION: ICD-10-CM

## 2024-11-18 RX ORDER — APIXABAN 5 MG/1
5 TABLET, FILM COATED ORAL 2 TIMES DAILY
Qty: 60 TABLET | Refills: 2 | Status: SHIPPED | OUTPATIENT
Start: 2024-11-18

## 2024-12-06 DIAGNOSIS — I10 ESSENTIAL HYPERTENSION: ICD-10-CM

## 2024-12-06 RX ORDER — AMLODIPINE BESYLATE 5 MG/1
5 TABLET ORAL
Qty: 30 TABLET | Refills: 0 | Status: SHIPPED | OUTPATIENT
Start: 2024-12-06

## 2025-01-09 DIAGNOSIS — I10 ESSENTIAL HYPERTENSION: ICD-10-CM

## 2025-01-09 RX ORDER — AMLODIPINE BESYLATE 5 MG/1
5 TABLET ORAL
Qty: 30 TABLET | Refills: 0 | Status: SHIPPED | OUTPATIENT
Start: 2025-01-09

## 2025-01-28 ENCOUNTER — TELEPHONE (OUTPATIENT)
Dept: ELECTROPHYSIOLOGY | Facility: CLINIC | Age: 65
End: 2025-01-28
Payer: COMMERCIAL

## 2025-01-28 NOTE — PROGRESS NOTES
Ms. Junior is a patient of Dr. Andrade and was last seen in clinic 8/2023 with NP.      Subjective:   Patient ID:  Roxane Junior is a 64 y.o. female who presents for follow up of atrial tachycardia  .     HPI:    Ms. Junior is a 64 y.o. female with AF (PVI 2/2023), bradycardia, PACs, hyperthyroid here for follow up.     Background:    Mrs. Junior has a history of hypertension, morbid obesity and bradycardia. She was referred to Dr. Koehler in 2021 for evaluation of bradycardia. She wore a holter monitor in July of 2021 which noted an average sinus rate of 41 bpm. There were periods during day and night time where her rate was in the 30s. She did have frequent PACs. She reports her heart rate has been like this for many years. She denies any fatigue, near syncope, or syncope. Since that visit she reported noting an episode of heart racing lasting 5 minutes in July of 2022. A 30 day event monitor was ordered which noted periods of bradycardia and an episode of long RP narrow complex tachycardia with a rate of 150 bpm (incorrectly read as atrial fibrillation by the monitoring company). This occurred at midnight on 9/9/2022. She was asleep. We discussed that should she begin to have frequent symptomatic episodes the treatment options would be EPS/ablation or attempt drug therapy which may require PPM implantation.     7/2021: Holter noted sinus bradycardia with frequent PACs (8% burden). Average rate 41 bpm.     9/2022: ECHO noted preserved LV function.     Available electrocardiograms in Epic which show sinus bradycardia.     Mrs. Junior returns for follow-up 11/22/2022. 30 day monitor noted a single episode of tachycardia with rate at 150 bpm. Initially suspected possible AFL with 2:1 AV conduction however re-evaluation noted a gradual slowing towards the end with the relationship being a long RP tachycardia. More likely this was an AT. Today she is in atrial fibrillation. Reports palpitations for the past few  days.  ECG is atrial fibrillation with an average ventricular rate of 123 bpm.  Her [id number] score is 2 and anticoagulation is currently recommended. She will start eliquis. I also discussed the goal to reduce symptomatic arrhythmic episodes by pharmacologic and/or procedural methods and utilizing a rhythm versus a rate control strategy. Due to symptoms, RVR, and young age I recommend rhythm control. Discussed the problematic nature of symptomatic AF with RVR and asymptomatic sinus bradycardia. Prefer to avoid PPM just for anti-arrhythmic drug therapy. Discussed dofetilide versus PVI. She is open to try dofetilide. If she is intolerant to this or it doesn't work then would advocate for PVI.  Plan: Admit for dofetilide initiation. MIRIAM/DCCV if she presents in AF. Start eliquis 5mg bid.     12/9/2022: DCCV +tikosyn. Had recurrence     2/16/2023: Successful pulmonary vein RF ablation.     5/16/2023: She is 3 months s/p PVI. She is doing well from a rhythm standpoint, with no documented or symptomatic recurrence of arrhythmia since procedure. MIRIAM 2/16/2023 showed normal EF 55%.  She remains on tikosyn. QT 480ms. She is vu but asymptomatic. She is reporting worsening leg edema since stopping HCTZ (incompatible with tikosyn). She is also on amlodipine. Discussed case with Dr. Andrade. Will keep patient on tikosyn for now. Will switch amlodipine for losartan. Check BMP in 2 weeks. On eliquis for CVA prophylaxis.     6/18/2023: UTI     6/19/2023: pt reported continued HTN since stopping HCTZ. Tikosyn was stopped and HCTZ restarted.     6/20/2023: Pt went to ED with palps - some ECG c/w AFL with RVR. Discharged in SR.     8/16/23: Has not had any palpitations since her hospital visit. No cardiac complaints. Ms. Junior reports no chest pain with exertion or at rest, palpitations, SOB, RIZVI, dizziness, or syncope.  She is now 6 months s/p ablation. Tikosyn stopped 6/14/2023 so HCTZ could be restarted. She did have  paroxysmal AFL with RVR breakthrough in context of UTI 6/20/2023.  No breakthroughs since then and she remains off tikosyn. Gonzalez is asymptomatic. Her BPs are elevated caio at home. Will add 5mg amlodipine (she had leg edema on 10mg dose when off HCTZ).  She remains on eliquis for CVA prophylaxis.      Update (01/29/2025):    Today pt states she feels overall off.  Denies CP, SOB, dizziness, syncope. + palpitations ongoing for about 2 days.  Has felt off for about 2 days now. States her afib previously did not stay this long.     She is currently taking Eliquis 5mg BID for stroke prophylaxis and denies significant bleeding episodes. She is not currently on AAD. Not on BB for HR control related to baseline HR.       I have personally reviewed the patient's EKG today, which shows AFL at a rate 141 bpm.    Relevant Cardiac Test Results:    MIRIAM 2/2023:  The left ventricle is normal in size with normal systolic function.  The estimated ejection fraction is 55%.  Normal right ventricular size with normal right ventricular systolic function.  No thrombus is present in the appendage. Normal appendage velocities.  Mild mitral regurgitation.  Grade 2 plaque present in the transverse aorta and descending aorta.    Current Outpatient Medications   Medication Sig    amLODIPine (NORVASC) 5 MG tablet Take 1 tablet by mouth once daily    apixaban (ELIQUIS) 5 mg Tab Take 1 tablet by mouth twice daily    hydroCHLOROthiazide (HYDRODIURIL) 12.5 MG Tab Take 1 tablet (12.5 mg total) by mouth once daily.    losartan (COZAAR) 100 MG tablet Take 1 tablet by mouth once daily    methIMAzole (TAPAZOLE) 5 MG Tab Take 5 mg by mouth once daily.    metoprolol tartrate (LOPRESSOR) 25 MG tablet Take 1 tablet (25 mg total) by mouth 2 (two) times daily.     No current facility-administered medications for this visit.       Review of Systems   Constitutional: Negative for chills, diaphoresis, fever and malaise/fatigue.   HENT: Negative.     Eyes:   "Negative for pain and visual disturbance.   Cardiovascular:  Positive for irregular heartbeat and palpitations. Negative for chest pain, dyspnea on exertion, leg swelling, near-syncope and syncope.   Respiratory:  Negative for cough, hemoptysis, shortness of breath, sleep disturbances due to breathing and wheezing.    Endocrine: Negative.    Hematologic/Lymphatic: Negative.    Skin: Negative.  Negative for rash.   Musculoskeletal:  Negative for falls, joint swelling and myalgias.   Gastrointestinal: Negative.  Negative for hematemesis and melena.   Genitourinary: Negative.  Negative for hematuria.   Neurological:  Negative for dizziness, focal weakness, headaches and light-headedness.   Psychiatric/Behavioral:  Negative for altered mental status.        Objective:          /80 (BP Location: Right arm, Patient Position: Sitting)   Pulse (!) 141   Ht 5' 4" (1.626 m)   Wt (!) 145.6 kg (320 lb 15.8 oz)   BMI 55.10 kg/m²     Physical Exam  Vitals reviewed.   Constitutional:       General: She is not in acute distress.     Appearance: She is not ill-appearing.   HENT:      Head: Atraumatic.      Nose: No congestion.   Eyes:      Extraocular Movements: Extraocular movements intact.   Cardiovascular:      Rate and Rhythm: Normal rate and regular rhythm.      Pulses: Normal pulses.   Pulmonary:      Effort: Pulmonary effort is normal. No respiratory distress.   Abdominal:      General: Abdomen is flat.      Palpations: Abdomen is soft.   Musculoskeletal:         General: Normal range of motion.      Cervical back: Normal range of motion.      Right lower leg: No edema.      Left lower leg: No edema.   Skin:     General: Skin is warm and dry.      Findings: No rash.   Neurological:      General: No focal deficit present.      Mental Status: She is alert and oriented to person, place, and time. Mental status is at baseline.   Psychiatric:         Mood and Affect: Mood normal.         Behavior: Behavior normal. " Behavior is cooperative.           Lab Results   Component Value Date     12/23/2024     09/16/2024    K 4.1 12/23/2024    K 3.8 09/16/2024    MG 1.7 06/20/2023    BUN 11 12/23/2024    BUN 18 09/16/2024    CREATININE 0.86 12/23/2024    CREATININE 1.19 (H) 09/16/2024    ALT 12 09/16/2024    AST 16 09/16/2024    HGB 13.4 09/16/2024    HCT 42.3 09/16/2024    TSH 4.322 (H) 06/20/2023       Recent Labs   Lab 11/29/22  1606 02/09/23  1603   INR 1.2 H 1.0       Assessment:     1. Atrial flutter, unspecified type    2. Atrial tachycardia    3. Bradycardia    4. Essential hypertension    5. Paroxysmal atrial fibrillation    6. BMI 50.0-59.9, adult    7. Hyperthyroidism        Plan:     In summary, Ms. Junior is a 64 y.o. female with AF (PVI 2/2023), bradycardia, PACs, hyperthyroid here for follow up.   PVI in 2023, with no documented reoccurrence or symptoms until 2 days ago. EKG today showing AFL with rate 140-150 pt feeling palpitations for 2 days now and not feeling her self.  CHADVASc 2 (age, female) continue AC  Discussed options of CV, AAD, rate control, and/or ablation. Discussed risk and benefits of procedures. Pt would like to proceed with CV and ablation.  Will start metoprolol 25mg BID to help with elevated rate, will re-evaluate after CV as she has previous hx of bradycardia in SR.  Nurse will call to schedule procedure.      Follow up if symptoms worsen or fail to improve.    ------------------------------------------------------------------    Juany Omer DNP  Cardiac Electrophysiology

## 2025-01-29 ENCOUNTER — OFFICE VISIT (OUTPATIENT)
Dept: ELECTROPHYSIOLOGY | Facility: CLINIC | Age: 65
End: 2025-01-29
Payer: COMMERCIAL

## 2025-01-29 ENCOUNTER — PATIENT MESSAGE (OUTPATIENT)
Dept: ELECTROPHYSIOLOGY | Facility: CLINIC | Age: 65
End: 2025-01-29

## 2025-01-29 ENCOUNTER — TELEPHONE (OUTPATIENT)
Dept: ELECTROPHYSIOLOGY | Facility: CLINIC | Age: 65
End: 2025-01-29
Payer: COMMERCIAL

## 2025-01-29 VITALS
DIASTOLIC BLOOD PRESSURE: 80 MMHG | HEIGHT: 64 IN | SYSTOLIC BLOOD PRESSURE: 126 MMHG | BODY MASS INDEX: 50.02 KG/M2 | WEIGHT: 293 LBS | HEART RATE: 141 BPM

## 2025-01-29 DIAGNOSIS — R00.1 BRADYCARDIA: ICD-10-CM

## 2025-01-29 DIAGNOSIS — I47.19 ATRIAL TACHYCARDIA: ICD-10-CM

## 2025-01-29 DIAGNOSIS — I10 ESSENTIAL HYPERTENSION: ICD-10-CM

## 2025-01-29 DIAGNOSIS — I48.0 PAROXYSMAL ATRIAL FIBRILLATION: ICD-10-CM

## 2025-01-29 DIAGNOSIS — E05.90 HYPERTHYROIDISM: ICD-10-CM

## 2025-01-29 DIAGNOSIS — I48.92 ATRIAL FLUTTER, UNSPECIFIED TYPE: Primary | ICD-10-CM

## 2025-01-29 DIAGNOSIS — I48.0 PAROXYSMAL ATRIAL FIBRILLATION: Primary | ICD-10-CM

## 2025-01-29 PROCEDURE — 1159F MED LIST DOCD IN RCRD: CPT | Mod: CPTII,S$GLB,, | Performed by: INTERNAL MEDICINE

## 2025-01-29 PROCEDURE — 99215 OFFICE O/P EST HI 40 MIN: CPT | Mod: S$GLB,,, | Performed by: INTERNAL MEDICINE

## 2025-01-29 PROCEDURE — 93010 ELECTROCARDIOGRAM REPORT: CPT | Mod: S$GLB,,, | Performed by: INTERNAL MEDICINE

## 2025-01-29 PROCEDURE — 3079F DIAST BP 80-89 MM HG: CPT | Mod: CPTII,S$GLB,, | Performed by: INTERNAL MEDICINE

## 2025-01-29 PROCEDURE — 3074F SYST BP LT 130 MM HG: CPT | Mod: CPTII,S$GLB,, | Performed by: INTERNAL MEDICINE

## 2025-01-29 PROCEDURE — 3008F BODY MASS INDEX DOCD: CPT | Mod: CPTII,S$GLB,, | Performed by: INTERNAL MEDICINE

## 2025-01-29 PROCEDURE — 1101F PT FALLS ASSESS-DOCD LE1/YR: CPT | Mod: CPTII,S$GLB,, | Performed by: INTERNAL MEDICINE

## 2025-01-29 PROCEDURE — 1126F AMNT PAIN NOTED NONE PRSNT: CPT | Mod: CPTII,S$GLB,, | Performed by: INTERNAL MEDICINE

## 2025-01-29 PROCEDURE — 3288F FALL RISK ASSESSMENT DOCD: CPT | Mod: CPTII,S$GLB,, | Performed by: INTERNAL MEDICINE

## 2025-01-29 PROCEDURE — 93005 ELECTROCARDIOGRAM TRACING: CPT | Mod: S$GLB,,, | Performed by: INTERNAL MEDICINE

## 2025-01-29 PROCEDURE — 99999 PR PBB SHADOW E&M-EST. PATIENT-LVL III: CPT | Mod: PBBFAC,,, | Performed by: INTERNAL MEDICINE

## 2025-01-29 PROCEDURE — 1160F RVW MEDS BY RX/DR IN RCRD: CPT | Mod: CPTII,S$GLB,, | Performed by: INTERNAL MEDICINE

## 2025-01-29 RX ORDER — METOPROLOL TARTRATE 25 MG/1
25 TABLET, FILM COATED ORAL 2 TIMES DAILY
Qty: 180 TABLET | Refills: 3 | Status: SHIPPED | OUTPATIENT
Start: 2025-01-29 | End: 2026-01-29

## 2025-01-29 NOTE — TELEPHONE ENCOUNTER
Spoke with patient and scheduled procedure: MIRIAM/DCCV on 2/5/25 with an arrival time of 9AM.   Labs to be done at: [facility] on 1/31/25. Informed pt that orders will be entered for blood work and she will need to make an appt at [facility].   Confirmed that patient is not on GLP-1 agonist  Confirmed pt is taking Eliquis twice daily and denies missing any doses. Instructed pt to take Eliquis in the AM day of procedure with water prior to arrival time.   Informed pt that anesthesia will be used for procedure therefore she will need to ensure she has a caregiver available to bring her home after her procedure as she will not be allowed to drive for 24 hours.   Confirmed that patient does not have any implanted device that has remote or monitor that could cause electrical interference  Instructions will be sent via email, as requested.  Pt verbalized understanding and denies any questions.

## 2025-01-30 DIAGNOSIS — I48.0 PAROXYSMAL ATRIAL FIBRILLATION: Primary | ICD-10-CM

## 2025-01-30 LAB
OHS QRS DURATION: 158 MS
OHS QTC CALCULATION: 566 MS

## 2025-02-01 LAB
APTT PPP: 32 SEC (ref 23–32)
BUN SERPL-MCNC: 19 MG/DL (ref 7–25)
BUN/CREAT SERPL: NORMAL (CALC) (ref 6–22)
CALCIUM SERPL-MCNC: 9.6 MG/DL (ref 8.6–10.4)
CHLORIDE SERPL-SCNC: 102 MMOL/L (ref 98–110)
CO2 SERPL-SCNC: 28 MMOL/L (ref 20–32)
CREAT SERPL-MCNC: 1.02 MG/DL (ref 0.5–1.05)
EGFR: 61 ML/MIN/1.73M2
ERYTHROCYTE [DISTWIDTH] IN BLOOD BY AUTOMATED COUNT: 13.8 % (ref 11–15)
GLUCOSE SERPL-MCNC: 82 MG/DL (ref 65–99)
HCT VFR BLD AUTO: 40.6 % (ref 35–45)
HGB BLD-MCNC: 13.2 G/DL (ref 11.7–15.5)
INR PPP: 1.1
MCH RBC QN AUTO: 29.9 PG (ref 27–33)
MCHC RBC AUTO-ENTMCNC: 32.5 G/DL (ref 32–36)
MCV RBC AUTO: 91.9 FL (ref 80–100)
PLATELET # BLD AUTO: 268 THOUSAND/UL (ref 140–400)
PMV BLD REES-ECKER: 12.6 FL (ref 7.5–12.5)
POTASSIUM SERPL-SCNC: 3.9 MMOL/L (ref 3.5–5.3)
PROTHROMBIN TIME: 12 SEC (ref 9–11.5)
RBC # BLD AUTO: 4.42 MILLION/UL (ref 3.8–5.1)
SODIUM SERPL-SCNC: 142 MMOL/L (ref 135–146)
WBC # BLD AUTO: 6.4 THOUSAND/UL (ref 3.8–10.8)

## 2025-02-04 ENCOUNTER — TELEPHONE (OUTPATIENT)
Dept: ELECTROPHYSIOLOGY | Facility: CLINIC | Age: 65
End: 2025-02-04
Payer: COMMERCIAL

## 2025-02-04 NOTE — TELEPHONE ENCOUNTER
Spoke to patient.    CONFIRMED procedure arrival time of 9AM on 2/5/25 for cardioversion at [facility].     Reiterated instructions including:  -Directions to check in desk  -NPO after midnight night prior to procedure; informed pt that she may drink water until 7AM on day of procedure.   -Confirmed compliance of Eliquis. Pt confirmed that she takes Eliquis twice daily and denies missing any doses. Instructed pt to take Eliquis in the AM prior to 7AM with water.   -Pre-procedure LABS reviewed; no significant alerts noted. Labs from [employer] to media.   -Confirmed absence of implanted device/stimulator reviewed.  -Confirmed no fever, cough, or shortness of breath in the past 30 days  -Do not wear mascara day of procedure  -Informed pt that due to receiving anesthesia for procedure she will not be able to drive for 24 hours. Pt confirmed that she has a caregiver available to bring her home post procedure.   Patient verbalized understanding of above and appreciated the call.

## 2025-02-05 ENCOUNTER — HOSPITAL ENCOUNTER (OUTPATIENT)
Facility: HOSPITAL | Age: 65
Discharge: HOME | End: 2025-02-05
Attending: INTERNAL MEDICINE | Admitting: INTERNAL MEDICINE
Payer: COMMERCIAL

## 2025-02-05 DIAGNOSIS — I48.0 PAROXYSMAL ATRIAL FIBRILLATION: ICD-10-CM

## 2025-02-05 DIAGNOSIS — I48.91 ATRIAL FIBRILLATION: ICD-10-CM

## 2025-02-05 LAB
OHS QRS DURATION: 114 MS
OHS QTC CALCULATION: 367 MS

## 2025-02-05 PROCEDURE — 93010 ELECTROCARDIOGRAM REPORT: CPT | Mod: ,,, | Performed by: INTERNAL MEDICINE

## 2025-02-05 PROCEDURE — 93005 ELECTROCARDIOGRAM TRACING: CPT

## 2025-02-05 NOTE — H&P
[facility]  Cardiology  MIRIAM/DCCV History & Physical      Roxane Junior  YOB: 1960  Medical Record Number:  [id number]  Attending Physician:  Prabhu Andrade MD   Date of Admission: 2/5/2025       Hospital Day:  0  Current Principal Problem:  <principal problem not specified>    Patient information was obtained from patient and past medical records.  History     Cc: MIRIAM/DCCV for AF    HPI  Last OV with Dr. Andrade on 01/29/25.   Ms. Junior is a 64 y.o. female with AF (PVI 2/2023), bradycardia, PACs, hyperthyroid here for follow up.      Background:  Mrs. Junior has a history of hypertension, morbid obesity and bradycardia. She was referred to Dr. Koehler in 2021 for evaluation of bradycardia. She wore a holter monitor in July of 2021 which noted an average sinus rate of 41 bpm. There were periods during day and night time where her rate was in the 30s. She did have frequent PACs. She reports her heart rate has been like this for many years. She denies any fatigue, near syncope, or syncope. Since that visit she reported noting an episode of heart racing lasting 5 minutes in July of 2022. A 30 day event monitor was ordered which noted periods of bradycardia and an episode of long RP narrow complex tachycardia with a rate of 150 bpm (incorrectly read as atrial fibrillation by the monitoring company). This occurred at midnight on 9/9/2022. She was asleep. We discussed that should she begin to have frequent symptomatic episodes the treatment options would be EPS/ablation or attempt drug therapy which may require PPM implantation.     7/2021: Holter noted sinus bradycardia with frequent PACs (8% burden). Average rate 41 bpm.     9/2022: ECHO noted preserved LV function.     Available electrocardiograms in Epic which show sinus bradycardia.     Mrs. Junior returns for follow-up 11/22/2022. 30 day monitor noted a single episode of tachycardia with rate at 150 bpm. Initially  suspected possible AFL with 2:1 AV conduction however re-evaluation noted a gradual slowing towards the end with the relationship being a long RP tachycardia. More likely this was an AT. Today she is in atrial fibrillation. Reports palpitations for the past few days.  ECG is atrial fibrillation with an average ventricular rate of 123 bpm.  Her [id number] score is 2 and anticoagulation is currently recommended. She will start eliquis. I also discussed the goal to reduce symptomatic arrhythmic episodes by pharmacologic and/or procedural methods and utilizing a rhythm versus a rate control strategy. Due to symptoms, RVR, and young age I recommend rhythm control. Discussed the problematic nature of symptomatic AF with RVR and asymptomatic sinus bradycardia. Prefer to avoid PPM just for anti-arrhythmic drug therapy. Discussed dofetilide versus PVI. She is open to try dofetilide. If she is intolerant to this or it doesn't work then would advocate for PVI.  Plan: Admit for dofetilide initiation. MIRIAM/DCCV if she presents in AF. Start eliquis 5mg bid.     12/9/2022: DCCV +tikosyn. Had recurrence     2/16/2023: Successful pulmonary vein RF ablation.     5/16/2023: She is 3 months s/p PVI. She is doing well from a rhythm standpoint, with no documented or symptomatic recurrence of arrhythmia since procedure. MIRIAM 2/16/2023 showed normal EF 55%.  She remains on tikosyn. QT 480ms. She is vu but asymptomatic. She is reporting worsening leg edema since stopping HCTZ (incompatible with tikosyn). She is also on amlodipine. Discussed case with Dr. Andrade. Will keep patient on tikosyn for now. Will switch amlodipine for losartan. Check BMP in 2 weeks. On eliquis for CVA prophylaxis.     6/18/2023: UTI     6/19/2023: pt reported continued HTN since stopping HCTZ. Tikosyn was stopped and HCTZ restarted.     6/20/2023: Pt went to ED with palps - some ECG c/w AFL with RVR. Discharged in SR.     8/16/23: Has not had any palpitations since  her hospital visit. No cardiac complaints. Ms. Junior reports no chest pain with exertion or at rest, palpitations, SOB, RIZVI, dizziness, or syncope.  She is now 6 months s/p ablation. Tikosyn stopped 6/14/2023 so HCTZ could be restarted. She did have paroxysmal AFL with RVR breakthrough in context of UTI 6/20/2023.  No breakthroughs since then and she remains off tikosyn. Gonzalez is asymptomatic. Her BPs are elevated caio at home. Will add 5mg amlodipine (she had leg edema on 10mg dose when off HCTZ).  She remains on eliquis for CVA prophylaxis.     Update (01/29/2025):  Today pt states she feels overall off.  Denies CP, SOB, dizziness, syncope. + palpitations ongoing for about 2 days.  Has felt off for about 2 days now. States her afib previously did not stay this long.      She is currently taking Eliquis 5mg BID for stroke prophylaxis and denies significant bleeding episodes. She is not currently on AAD. Not on BB for HR control related to baseline HR. I have personally reviewed the patient's EKG today, which shows AFL at a rate 141 bpm.      Today, in good spirits. She reports feeling like she was back in sinus rhythm as she has been asymptomatic with no cardiac complaints. Pr-procedure EKG today shows sinus bradycardia.     Rate/rhythm control: metoprolol 25 mg BID   Anticoagulant/antiplatelets: Eliquis 5 mg BID   ECG: Sinus bradycardia at 37 bpm   Platelet count: 268  INR: 1.1    History of stroke:  no  Dysphagia or odynophagia:  no  Liver Disease, esophageal disease, or known varices:  no  Upper GI Bleeding:  no  Snoring:  no   Sleep Apnea:  no  Prior neck surgery or radiation:  no  History of anesthetic difficulties:  no  Family history of anesthetic difficulties:  no  Last oral intake: last pm   Able to move neck in all directions:  yes  GLP-1 Use: no      Medications - Outpatient  Prior to Admission medications    Medication Sig Start Date End Date Taking? Authorizing Provider   amLODIPine (NORVASC) 5 MG  tablet Take 1 tablet by mouth once daily 25   Prabhu Andrade MD   apixaban (ELIQUIS) 5 mg Tab Take 1 tablet by mouth twice daily 24   Prabhu Andrade MD   hydroCHLOROthiazide (HYDRODIURIL) 12.5 MG Tab Take 1 tablet (12.5 mg total) by mouth once daily. 23  Melba Miranda PA-C   losartan (COZAAR) 100 MG tablet Take 1 tablet by mouth once daily 24   [place] Koehler MD   methIMAzole (TAPAZOLE) 5 MG Tab Take 5 mg by mouth once daily.    Provider, Historical   metoprolol tartrate (LOPRESSOR) 25 MG tablet Take 1 tablet (25 mg total) by mouth 2 (two) times daily. 25  Juany Omer DNP       Medications - Current  Scheduled Meds:  Continuous Infusions:  PRN Meds:.      Allergies  Review of patient's allergies indicates:  No Known Allergies      Past Medical History  Past Medical History:   Diagnosis Date    Atrial fibrillation     Hypertension     Hyperthyroidism     Visit for monitoring Tikosyn therapy 2022       Past Surgical History  Past Surgical History:   Procedure Laterality Date    ABLATION OF ARRHYTHMOGENIC FOCUS FOR ATRIAL FIBRILLATION N/A 2023    Procedure: Ablation atrial fibrillation;  Surgeon: Prabhu Andrade MD;  Location: [facility] EP LAB;  Service: Cardiology;  Laterality: N/A;  AF, MIRIAM (Cx if SR), PVI, RFA, Carto, Gen, AK, 3 Prep    COLONOSCOPY N/A 10/24/2023    Procedure: COLONOSCOPY;  Surgeon: BAKARI Ma MD;  Location: [facility] (2ND FLR);  Service: Endoscopy;  Laterality: N/A;  PER DR. MA TO ADD  Ref By: J Carlos  Procedure:Colonscopy  Diagnosis:Abnormal finding on GI tract imaging /concern for cancer  Procedure Timin-4 weeks  Provider: J Carlos  BT:ok to hold Eliquis 2 days per Dr Andrade-GT  Location: [facility]o  Prep Specification    ECHOCARDIOGRAM,TRANSESOPHAGEAL N/A 2023    Procedure: Transesophageal echo (MIRIAM) intra-procedure log documentation;  Surgeon: Kunal Jacobsen MD;  Location: [facility] EP LAB;  Service: Cardiology;   Laterality: N/A;    TREATMENT OF CARDIAC ARRHYTHMIA N/A 12/6/2022    Procedure: CARDIOVERSION;  Surgeon: Prabhu Andrade MD;  Location: [facility] EP LAB;  Service: Cardiology;  Laterality: N/A;  afib, MIRIAM( Cx if pt in SR)  DCCV, anes, AR, 3 Prep *Tikosyn Admit post procedure*    TREATMENT OF CARDIAC ARRHYTHMIA  2/16/2023    Procedure: Cardioversion or Defibrillation;  Surgeon: Prabhu Andrade MD;  Location: [facility] EP LAB;  Service: Cardiology;;    TUBAL LIGATION         Social History  Social History     Socioeconomic History    Marital status: Single   Tobacco Use    Smoking status: Never    Smokeless tobacco: Never   Substance and Sexual Activity    Alcohol use: Yes     Comment: occ    Drug use: Never     Social Drivers of Health     Financial Resource Strain: Low Risk  (8/17/2021)    Received from [email or web address]    Overall Financial Resource Strain (CARDIA)     Difficulty of Paying Living Expenses: Not hard at all   Food Insecurity: No Food Insecurity (8/17/2021)    Received from [email or web address]    Hunger Vital Sign     Worried About Running Out of Food in the Last Year: Never true     Ran Out of Food in the Last Year: Never true   Transportation Needs: No Transportation Needs (8/17/2021)    Received from [email or web address]    PRAPARE - Transportation     Lack of Transportation (Medical): No     Lack of Transportation (Non-Medical): No   Physical Activity: Insufficiently Active (8/17/2021)    Received from [email or web address]    Exercise Vital Sign     Days of Exercise per Week: 5 days     Minutes of Exercise per Session: 20 min   Stress: No Stress Concern Present (8/17/2021)    Received from [email or web address]    [employer] - Occupational Stress Questionnaire     Feeling of Stress : Not at all   Housing Stability: Unknown (8/17/2021)    Received from [email or web address]    Housing Stability Vital Sign     Unable to Pay for Housing in the Last Year:  No     Unstable Housing in the Last Year: No       ROS  Review of Systems   Constitutional: Negative for chills.   HENT: Negative.     Eyes: Negative.    Cardiovascular:  Negative for chest pain, dyspnea on exertion and palpitations.   Respiratory: Negative.  Negative for shortness of breath and sleep disturbances due to breathing.    Endocrine: Negative.    Musculoskeletal: Negative.    Gastrointestinal:  Negative for hematemesis, melena, nausea and vomiting.   Genitourinary: Negative.    Neurological: Negative.    Psychiatric/Behavioral: Negative.  Negative for altered mental status.    Allergic/Immunologic: Negative.    Physical Examination     Vital Signs  24 Hour VS Range       No intake or output data in the 24 hours ending 02/05/25 0944      Physical Exam:   Physical Exam  Constitutional:       General: She is not in acute distress.     Appearance: Normal appearance. She is obese. She is not ill-appearing.   HENT:      Head: Normocephalic and atraumatic.      Nose: Nose normal. No congestion or rhinorrhea.      Mouth/Throat:      Mouth: Mucous membranes are moist.      Pharynx: Oropharynx is clear. No oropharyngeal exudate or posterior oropharyngeal erythema.   Eyes:      General:         Right eye: No discharge.         Left eye: No discharge.      Extraocular Movements: Extraocular movements intact.      Conjunctiva/sclera: Conjunctivae normal.   Cardiovascular:      Rate and Rhythm: Bradycardia present.   Pulmonary:      Effort: Pulmonary effort is normal. No respiratory distress.      Breath sounds: Normal breath sounds. No stridor. No wheezing or rales.   Musculoskeletal:         General: No swelling. Normal range of motion.      Cervical back: Normal range of motion and neck supple. No rigidity or tenderness.      Right lower leg: No edema.      Left lower leg: No edema.   Skin:     General: Skin is warm and dry.      Coloration: Skin is not jaundiced.      Findings: No bruising or lesion.  "  Neurological:      General: No focal deficit present.      Mental Status: She is alert and oriented to person, place, and time. Mental status is at baseline.      Cranial Nerves: No cranial nerve deficit.      Motor: No weakness.      Gait: Gait normal.   Psychiatric:         Mood and Affect: Mood normal.         Behavior: Behavior normal.         Thought Content: Thought content normal.         Judgment: Judgment normal.        Data       Recent Labs   Lab 01/31/25  1433   WBC 6.4   HGB 13.2   HCT 40.6           Recent Labs   Lab 01/31/25  1433   INR 1.1        Recent Labs   Lab 01/31/25  1433      K 3.9      CO2 28   BUN 19   CREATININE 1.02   CALCIUM 9.6        No results for input(s): "PROT", "ALBUMIN", "BILITOT", "ALKPHOS", "AST", "ALT" in the last 168 hours.     No results for input(s): "TROPONINI" in the last 168 hours.     BNP (pg/mL)   Date Value   06/20/2023 264 (H)       No results for input(s): "LABBLOO" in the last 168 hours.     Assessment & Plan     #Atrial flutter  -patient presents for MIRIAM/DCCV  -pre-procedure EKG notes patient in sinus bradycardia, procedure aborted       Dr. Andrade Plan from 01/29/25:   Will proceed first with MIRIAM/DCCV and then ablation at next available date.       MIRIAM 02/16/23  The left ventricle is normal in size with normal systolic function.  The estimated ejection fraction is 55%.  Normal right ventricular size with normal right ventricular systolic function.  No thrombus is present in the appendage. Normal appendage velocities.  Mild mitral regurgitation.  Grade 2 plaque present in the transverse aorta and descending aorta.    MIRIAM 12/06/22  The left ventricle is normal in size with Moderately decreased systolic function.The estimated ejection fraction is 40% (at a heart rate of 150bpm)  Normal right ventricular size with mildly to moderately reduced right ventricular systolic function.  No interatrial septal defect present.  Normal appearing left atrial " appendage. No thrombus is present in the appendage.  Grade 2 plaque present.  The estimated ejection fraction is 40%.  Mild mitral regurgitation.  Left ventricular diastolic dysfunction.    -No absolute contraindications of esophageal stricture, tumor, perforation, laceration,or diverticulum and/or active GI bleed.  -The risks, benefits & alternatives of the procedure were explained to the patient.   -The risks of transesophageal echo include but are not limited to:  Dental trauma, esophageal trauma/perforation, bleeding, laryngospasm/brochospasm, aspiration, sore throat/hoarseness, & dislodgement of the endotracheal tube/nasogastric tube (where applicable).    -The risks of moderate sedation include hypotension, respiratory depression, arrhythmias, bronchospasm, & death.    -Prior to procedure, extensive discussion with patient regarding risks and benefits of DCCV at bedside today. The patient voices understanding, all questions have been answered, and patient would like to proceed.  -Informed consent was obtained. The patient is agreeable to proceed with the procedure and all questions and concerns addressed.    Case was discussed with an attending physician prior to procedure.    Alysha Dickens PA-C  [facility]

## 2025-02-05 NOTE — NURSING
Procedure cancelled d/t NSR. Alysha GLOVER spoke w pt.  Pt DC'd per orders. DC paperwork reviewed w pt.  Pt verbalized DC instructions.    Pt walking off unit.

## 2025-02-05 NOTE — DISCHARGE SUMMARY
[facility]  Cardiology  Discharge Summary      Patient Name: Roxane Junior  MRN: [id number]  Admission Date: 2/5/2025  Hospital Length of Stay: 0 days  Discharge Date and Time:  02/05/2025 10:01 AM  Attending Physician: Prabhu Andrade MD    Discharging Provider: Alysha Dickens PA-C  Primary Care Physician: Dinesh Pacheco MD    HPI:   Last OV with Dr. Andrade on 01/29/25.   Ms. Junior is a 64 y.o. female with AF (PVI 2/2023), bradycardia, PACs, hyperthyroid here for follow up.      Background:  Mrs. Junior has a history of hypertension, morbid obesity and bradycardia. She was referred to Dr. Koehler in 2021 for evaluation of bradycardia. She wore a holter monitor in July of 2021 which noted an average sinus rate of 41 bpm. There were periods during day and night time where her rate was in the 30s. She did have frequent PACs. She reports her heart rate has been like this for many years. She denies any fatigue, near syncope, or syncope. Since that visit she reported noting an episode of heart racing lasting 5 minutes in July of 2022. A 30 day event monitor was ordered which noted periods of bradycardia and an episode of long RP narrow complex tachycardia with a rate of 150 bpm (incorrectly read as atrial fibrillation by the monitoring company). This occurred at midnight on 9/9/2022. She was asleep. We discussed that should she begin to have frequent symptomatic episodes the treatment options would be EPS/ablation or attempt drug therapy which may require PPM implantation.     7/2021: Holter noted sinus bradycardia with frequent PACs (8% burden). Average rate 41 bpm.     9/2022: ECHO noted preserved LV function.     Available electrocardiograms in Epic which show sinus bradycardia.     Mrs. Junior returns for follow-up 11/22/2022. 30 day monitor noted a single episode of tachycardia with rate at 150 bpm. Initially suspected possible AFL with 2:1 AV conduction however re-evaluation noted  a gradual slowing towards the end with the relationship being a long RP tachycardia. More likely this was an AT. Today she is in atrial fibrillation. Reports palpitations for the past few days.  ECG is atrial fibrillation with an average ventricular rate of 123 bpm.  Her [id number] score is 2 and anticoagulation is currently recommended. She will start eliquis. I also discussed the goal to reduce symptomatic arrhythmic episodes by pharmacologic and/or procedural methods and utilizing a rhythm versus a rate control strategy. Due to symptoms, RVR, and young age I recommend rhythm control. Discussed the problematic nature of symptomatic AF with RVR and asymptomatic sinus bradycardia. Prefer to avoid PPM just for anti-arrhythmic drug therapy. Discussed dofetilide versus PVI. She is open to try dofetilide. If she is intolerant to this or it doesn't work then would advocate for PVI.  Plan: Admit for dofetilide initiation. MIRIAM/DCCV if she presents in AF. Start eliquis 5mg bid.     12/9/2022: DCCV +tikosyn. Had recurrence     2/16/2023: Successful pulmonary vein RF ablation.     5/16/2023: She is 3 months s/p PVI. She is doing well from a rhythm standpoint, with no documented or symptomatic recurrence of arrhythmia since procedure. MIRIAM 2/16/2023 showed normal EF 55%.  She remains on tikosyn. QT 480ms. She is vu but asymptomatic. She is reporting worsening leg edema since stopping HCTZ (incompatible with tikosyn). She is also on amlodipine. Discussed case with Dr. Andrade. Will keep patient on tikosyn for now. Will switch amlodipine for losartan. Check BMP in 2 weeks. On eliquis for CVA prophylaxis.     6/18/2023: UTI     6/19/2023: pt reported continued HTN since stopping HCTZ. Tikosyn was stopped and HCTZ restarted.     6/20/2023: Pt went to ED with palps - some ECG c/w AFL with RVR. Discharged in SR.     8/16/23: Has not had any palpitations since her hospital visit. No cardiac complaints. Ms. Junior reports no chest  pain with exertion or at rest, palpitations, SOB, RIZVI, dizziness, or syncope.  She is now 6 months s/p ablation. Tikosyn stopped 6/14/2023 so HCTZ could be restarted. She did have paroxysmal AFL with RVR breakthrough in context of UTI 6/20/2023.  No breakthroughs since then and she remains off tikosyn. Gonzalez is asymptomatic. Her BPs are elevated caio at home. Will add 5mg amlodipine (she had leg edema on 10mg dose when off HCTZ).  She remains on eliquis for CVA prophylaxis.     Update (01/29/2025):  Today pt states she feels overall off.  Denies CP, SOB, dizziness, syncope. + palpitations ongoing for about 2 days.  Has felt off for about 2 days now. States her afib previously did not stay this long.      She is currently taking Eliquis 5mg BID for stroke prophylaxis and denies significant bleeding episodes. She is not currently on AAD. Not on BB for HR control related to baseline HR. I have personally reviewed the patient's EKG today, which shows AFL at a rate 141 bpm.        Today, in good spirits. She reports feeling like she was back in sinus rhythm as she has been asymptomatic with no cardiac complaints. Pr-procedure EKG today shows sinus bradycardia.     Procedure(s) (LRB):  Cardioversion or Defibrillation (N/A)  Transesophageal echo (MIRIAM) intra-procedure log documentation (N/A)     Indwelling Lines/Drains at time of discharge:  Lines/Drains/Airways       None     Hospital Course:  Patient presented for MIRIAM/DCCV. Pre-DCCV ECG revealed sinus bradycardia at 37 bpm. Plan to continue all home medications including Eliquis 5 mg BID; stop metoprolol 25 mg BID. Instructed to follow up with Dr. Andrade or Sobeida Henriquez NP if symptoms recur.   Patient was assessed at bedside prior to discharge, they reported feeling well and denied chest discomfort, shortness of breath, palpitations, lightheadedness, or any other acute symptoms. Discharge instructions were discussed with patient and all questions were answered.  Patient was discharged home in stable condition.       Goals of Care Treatment Preferences:  Code Status: Full Code      Significant Diagnostic Studies: Cardiac Graphics: ECG: Sinus bradycardia at 37 bpm     Pending Diagnostic Studies:       None            There are no hospital problems to display for this patient.    No new Assessment & Plan notes have been filed under this hospital service since the last note was generated.  Service: Cardiology      Discharged Condition: stable    Disposition: Home or Self Care    Follow Up:   Follow-up Information       Sobeida Henriquez, NOHEMY. Schedule an appointment as soon as possible for a visit in 1 month(s).    Specialty: Cardiology  Contact information:  [address]  [facility] [address]  [phone]                           Patient Instructions:   No discharge procedures on file.  Medications:  Reconciled Home Medications:      Medication List        CONTINUE taking these medications      amLODIPine 5 MG tablet  Commonly known as: NORVASC  Take 1 tablet by mouth once daily     ELIQUIS 5 mg Tab  Generic drug: apixaban  Take 1 tablet by mouth twice daily     hydroCHLOROthiazide 12.5 MG Tab  Take 1 tablet (12.5 mg total) by mouth once daily.     losartan 100 MG tablet  Commonly known as: COZAAR  Take 1 tablet by mouth once daily     methIMAzole 5 MG Tab  Commonly known as: TAPAZOLE  Take 5 mg by mouth once daily.            STOP taking these medications      metoprolol tartrate 25 MG tablet  Commonly known as: LOPRESSOR              Time spent on the discharge of patient: 35 minutes    Alysha Dickens PA-C  Cardiology  [facility] - Card[facility]

## 2025-02-05 NOTE — DISCHARGE INSTRUCTIONS
Medications:  -Continue to take your home medications as listed on your medication list after you are discharged.  -STOP taking metoprolol 25 mg     Follow up:  -Dr. Andrade or Sobeida Henriquez NP if symptoms recur     Any need to reschedule or cancel procedures, or any questions regarding your procedures should be addressed directly with the Arrhythmia Department Nurses at the following phone number: [phone].

## 2025-02-05 NOTE — HOSPITAL COURSE
Patient presented for MIRIAM/DCCV. Pre-DCCV ECG revealed sinus bradycardia at 37 bpm. Plan to continue all home medications including Eliquis 5 mg BID; stop metoprolol 25 mg BID. Instructed to follow up with Dr. Andrade or Sobeida Henriquez NP if symptoms recur.   Patient was assessed at bedside prior to discharge, they reported feeling well and denied chest discomfort, shortness of breath, palpitations, lightheadedness, or any other acute symptoms. Discharge instructions were discussed with patient and all questions were answered. Patient was discharged home in stable condition.

## 2025-02-07 ENCOUNTER — TELEPHONE (OUTPATIENT)
Dept: ELECTROPHYSIOLOGY | Facility: CLINIC | Age: 65
End: 2025-02-07
Payer: COMMERCIAL

## 2025-02-07 DIAGNOSIS — Z79.01 CHRONIC ANTICOAGULATION: ICD-10-CM

## 2025-02-07 DIAGNOSIS — I48.3 TYPICAL ATRIAL FLUTTER: ICD-10-CM

## 2025-02-07 DIAGNOSIS — I48.0 PAROXYSMAL ATRIAL FIBRILLATION: Primary | ICD-10-CM

## 2025-02-07 DIAGNOSIS — I47.19 ATRIAL TACHYCARDIA: ICD-10-CM

## 2025-02-07 DIAGNOSIS — I50.32 CHRONIC HEART FAILURE WITH PRESERVED EJECTION FRACTION (HFPEF): ICD-10-CM

## 2025-02-07 DIAGNOSIS — Z01.818 PRE-OP TESTING: ICD-10-CM

## 2025-02-07 NOTE — TELEPHONE ENCOUNTER
Spoke with Patient . Scheduled for PVI redo procedure on 4/8/2025 with Dr Andrade. Procedure details reviewed and advised that pre procedure patient instructions will be sent via email as requested. Advised to call the office for any questions or concerns prior to scheduled procedure. Understanding verbalized.

## 2025-02-11 ENCOUNTER — OFFICE VISIT (OUTPATIENT)
Dept: CARDIOLOGY | Facility: CLINIC | Age: 65
End: 2025-02-11
Payer: COMMERCIAL

## 2025-02-11 VITALS
SYSTOLIC BLOOD PRESSURE: 120 MMHG | DIASTOLIC BLOOD PRESSURE: 78 MMHG | HEART RATE: 41 BPM | WEIGHT: 293 LBS | BODY MASS INDEX: 55.87 KG/M2 | OXYGEN SATURATION: 97 %

## 2025-02-11 DIAGNOSIS — I50.32 CHRONIC HEART FAILURE WITH PRESERVED EJECTION FRACTION (HFPEF): ICD-10-CM

## 2025-02-11 DIAGNOSIS — I10 PRIMARY HYPERTENSION: ICD-10-CM

## 2025-02-11 DIAGNOSIS — I48.19 PERSISTENT ATRIAL FIBRILLATION: Primary | ICD-10-CM

## 2025-02-11 PROCEDURE — 1159F MED LIST DOCD IN RCRD: CPT | Mod: CPTII,S$GLB,, | Performed by: INTERNAL MEDICINE

## 2025-02-11 PROCEDURE — 1126F AMNT PAIN NOTED NONE PRSNT: CPT | Mod: CPTII,S$GLB,, | Performed by: INTERNAL MEDICINE

## 2025-02-11 PROCEDURE — 99999 PR PBB SHADOW E&M-EST. PATIENT-LVL III: CPT | Mod: PBBFAC,,, | Performed by: INTERNAL MEDICINE

## 2025-02-11 PROCEDURE — 1101F PT FALLS ASSESS-DOCD LE1/YR: CPT | Mod: CPTII,S$GLB,, | Performed by: INTERNAL MEDICINE

## 2025-02-11 PROCEDURE — 3078F DIAST BP <80 MM HG: CPT | Mod: CPTII,S$GLB,, | Performed by: INTERNAL MEDICINE

## 2025-02-11 PROCEDURE — 3008F BODY MASS INDEX DOCD: CPT | Mod: CPTII,S$GLB,, | Performed by: INTERNAL MEDICINE

## 2025-02-11 PROCEDURE — 3074F SYST BP LT 130 MM HG: CPT | Mod: CPTII,S$GLB,, | Performed by: INTERNAL MEDICINE

## 2025-02-11 PROCEDURE — 99214 OFFICE O/P EST MOD 30 MIN: CPT | Mod: S$GLB,,, | Performed by: INTERNAL MEDICINE

## 2025-02-11 PROCEDURE — 3288F FALL RISK ASSESSMENT DOCD: CPT | Mod: CPTII,S$GLB,, | Performed by: INTERNAL MEDICINE

## 2025-02-11 NOTE — PROGRESS NOTES
Cardiology    2/11/2025  2:14 PM    Problem list  Patient Active Problem List   Diagnosis    Right knee injury    Bradycardia    Essential hypertension    Hyperthyroidism    BMI 50.0-59.9, adult    Morbid obesity    Atrial tachycardia    Persistent atrial fibrillation    Chronic sinusitis    Chronic heart failure with preserved ejection fraction (HFpEF)    Abnormal heart rate    Hypertension    Hypokalemia    Paroxysmal atrial fibrillation    PSVT (paroxysmal supraventricular tachycardia)    Asymptomatic bradycardia    Atrial flutter       CC:  Follow-up    HPI:  Patient is here for follow-up.  She saw Dr. Andrade for atrial flutter and was scheduled for cardioversion but converted spontaneously.  She is scheduled for an ablation (redo) in April.  She denies any chest pain or shortness of breath.    Medications  Current Outpatient Medications   Medication Sig Dispense Refill    amLODIPine (NORVASC) 5 MG tablet Take 1 tablet by mouth once daily 30 tablet 0    apixaban (ELIQUIS) 5 mg Tab Take 1 tablet by mouth twice daily 60 tablet 2    losartan (COZAAR) 100 MG tablet Take 1 tablet by mouth once daily 90 tablet 3    methIMAzole (TAPAZOLE) 5 MG Tab Take 5 mg by mouth once daily.      hydroCHLOROthiazide (HYDRODIURIL) 12.5 MG Tab Take 1 tablet (12.5 mg total) by mouth once daily. 30 tablet 11     No current facility-administered medications for this visit.      Prior to Admission medications    Medication Sig Start Date End Date Taking? Authorizing Provider   amLODIPine (NORVASC) 5 MG tablet Take 1 tablet by mouth once daily 1/9/25  Yes Prabhu Andrade MD   apixaban (ELIQUIS) 5 mg Tab Take 1 tablet by mouth twice daily 11/18/24  Yes Prabhu Andrade MD   losartan (COZAAR) 100 MG tablet Take 1 tablet by mouth once daily 6/17/24  Yes Montana Koehler MD   methIMAzole (TAPAZOLE) 5 MG Tab Take 5 mg by mouth once daily.   Yes Provider, Historical   hydroCHLOROthiazide (HYDRODIURIL) 12.5 MG Tab Take 1 tablet (12.5 mg  total) by mouth once daily. 23  Melba Miranda PA-C         History  Past Medical History:   Diagnosis Date    Atrial fibrillation     Hypertension     Hyperthyroidism     Visit for monitoring Tikosyn therapy 2022     Past Surgical History:   Procedure Laterality Date    ABLATION OF ARRHYTHMOGENIC FOCUS FOR ATRIAL FIBRILLATION N/A 2023    Procedure: Ablation atrial fibrillation;  Surgeon: Prabhu Andrade MD;  Location: [facility] EP LAB;  Service: Cardiology;  Laterality: N/A;  AF, MIRIAM (Cx if SR), PVI, RFA, Carto, Gen, CT, 3 Prep    COLONOSCOPY N/A 10/24/2023    Procedure: COLONOSCOPY;  Surgeon: BAKARI Ma MD;  Location: [facility] ENDO (2ND FLR);  Service: Endoscopy;  Laterality: N/A;  PER DR. MA TO ADD  Ref By: J Carlos  Procedure:Colonscopy  Diagnosis:Abnormal finding on GI tract imaging /concern for cancer  Procedure Timin-4 weeks  Provider: J Carlos  BT:ok to hold Eliquis 2 days per Dr Andrade-  Location: [facility] 2-Endo  Prep Specification    ECHOCARDIOGRAM,TRANSESOPHAGEAL N/A 2023    Procedure: Transesophageal echo (MIRIAM) intra-procedure log documentation;  Surgeon: Kunal Jacobsen MD;  Location: [facility] EP LAB;  Service: Cardiology;  Laterality: N/A;    TREATMENT OF CARDIAC ARRHYTHMIA N/A 2022    Procedure: CARDIOVERSION;  Surgeon: Prabhu Andrade MD;  Location: [facility] EP LAB;  Service: Cardiology;  Laterality: N/A;  afib, MIRIAM( Cx if pt in SR)  DCCV, anes, CT, 3 Prep *Tikosyn Admit post procedure*    TREATMENT OF CARDIAC ARRHYTHMIA  2023    Procedure: Cardioversion or Defibrillation;  Surgeon: Prabhu Andrade MD;  Location: [facility] EP LAB;  Service: Cardiology;;    TUBAL LIGATION       Social History     Socioeconomic History    Marital status: Single   Tobacco Use    Smoking status: Never    Smokeless tobacco: Never   Substance and Sexual Activity    Alcohol use: Yes     Comment: occ    Drug use: Never     Social Drivers of Health     Financial Resource Strain: Low Risk   (8/17/2021)    Received from [facility]    Overall Financial Resource Strain (CARDIA)     Difficulty of Paying Living Expenses: Not hard at all   Food Insecurity: No Food Insecurity (8/17/2021)    Received from [facility] [place]    Hunger Vital Sign     Worried About Running Out of Food in the Last Year: Never true     Ran Out of Food in the Last Year: Never true   Transportation Needs: No Transportation Needs (8/17/2021)    Received from [facility] [place]    PRAPARE - Transportation     Lack of Transportation (Medical): No     Lack of Transportation (Non-Medical): No   Physical Activity: Insufficiently Active (8/17/2021)    Received from [facility] [place]    Exercise Vital Sign     Days of Exercise per Week: 5 days     Minutes of Exercise per Session: 20 min   Stress: No Stress Concern Present (8/17/2021)    Received from [facility] [place]    [employer] - Occupational Stress Questionnaire     Feeling of Stress : Not at all   Housing Stability: Unknown (8/17/2021)    Received from [facility] [place]    Housing Stability Vital Sign     Unable to Pay for Housing in the Last Year: No     Unstable Housing in the Last Year: No         Allergies  Review of patient's allergies indicates:  No Known Allergies      Review of Systems   Review of Systems   Constitutional: Negative for decreased appetite, fever and weight loss.   HENT:  Negative for congestion and nosebleeds.    Eyes:  Negative for double vision, vision loss in left eye, vision loss in right eye and visual disturbance.   Cardiovascular:  Negative for chest pain, claudication, cyanosis, dyspnea on exertion, irregular heartbeat, leg swelling, near-syncope, orthopnea, palpitations, paroxysmal nocturnal dyspnea and syncope.   Respiratory:  Negative for cough, hemoptysis, shortness of breath, sleep disturbances due to breathing, snoring, sputum production and wheezing.    Endocrine:  Negative for cold intolerance and heat intolerance.   Skin:  Negative for nail changes and rash.   Musculoskeletal:  Negative for joint pain, muscle cramps, muscle weakness and myalgias.   Gastrointestinal:  Negative for change in bowel habit, heartburn, hematemesis, hematochezia, hemorrhoids and melena.   Neurological:  Negative for dizziness, focal weakness and headaches.         Physical Exam  Wt Readings from Last 1 Encounters:   02/11/25 (!) 147.7 kg (325 lb 8.2 oz)     BP Readings from Last 3 Encounters:   02/11/25 120/78   01/29/25 126/80   08/20/24 122/88     Pulse Readings from Last 1 Encounters:   02/11/25 (!) 41     Body mass index is 55.87 kg/m².    Physical Exam  Vitals reviewed.   Constitutional:       Appearance: She is well-developed. She is obese.   HENT:      Head: Atraumatic.   Eyes:      General: No scleral icterus.  Neck:      Vascular: Normal carotid pulses. No carotid bruit, hepatojugular reflux or JVD.   Cardiovascular:      Rate and Rhythm: Regular rhythm. Bradycardia present.      Chest Wall: PMI is not displaced.      Pulses: Intact distal pulses.           Carotid pulses are 2+ on the right side and 2+ on the left side.       Radial pulses are 2+ on the right side and 2+ on the left side.        Dorsalis pedis pulses are 2+ on the right side and 2+ on the left side.      Heart sounds: S1 normal and S2 normal. Murmur heard.      Early systolic murmur is present with a grade of 2/6 at the upper right sternal border.      No friction rub.   Pulmonary:      Effort: Pulmonary effort is normal. No respiratory distress.      Breath sounds: Normal breath sounds. No stridor. No wheezing or rales.   Chest:      Chest wall: No tenderness.   Abdominal:      General: Bowel sounds are normal.      Palpations: Abdomen is soft.   Musculoskeletal:      Cervical back: Neck supple. No edema.      Right lower leg: No edema.      Left lower leg: No edema.   Skin:     General: Skin is warm and dry.      Nails:  There is no clubbing.   Neurological:      Mental Status: She is alert and oriented to person, place, and time.   Psychiatric:         Behavior: Behavior normal.         Thought Content: Thought content normal.             Assessment  1. Persistent atrial fibrillation (Primary)  Stable    2. Primary hypertension  Controlled.  Continue current medications and monitor    3. Chronic heart failure with preserved ejection fraction (HFpEF)  Compensated, continue medications and monitor    4. BMI 50.0-59.9, adult  Unchanged        Plan and Discussion  Discussed her blood pressure is controlled.  Recommend to continue current medications.  She is scheduled for an ablation with Dr. Andrade in April.    Follow Up  Six-month      [place] Koehler MD, F.A.C.C, F.S.C.A.I.      Total professional time spent for the encounter: 30 minutes  Time was spent preparing to see the patient, reviewing results of prior testing, obtaining and/or reviewing separately obtained history, performing a medically appropriate examination and interview, counseling and educating the patient/family, ordering medications/tests/procedures, referring and communicating with other health care professionals, documenting clinical information in the electronic health record, and independently interpreting results.    Disclaimer: This document was created using voice recognition software (M*Modal Fluency Direct). Although it may be edited, this document may contain errors related to incorrect recognition of the spoken word. Please call the physician if clarification is needed.

## 2025-03-17 DIAGNOSIS — I48.91 NEW ONSET ATRIAL FIBRILLATION: ICD-10-CM

## 2025-03-17 RX ORDER — APIXABAN 5 MG/1
5 TABLET, FILM COATED ORAL 2 TIMES DAILY
Qty: 60 TABLET | Refills: 6 | Status: SHIPPED | OUTPATIENT
Start: 2025-03-17

## 2025-03-25 ENCOUNTER — TELEPHONE (OUTPATIENT)
Dept: ELECTROPHYSIOLOGY | Facility: CLINIC | Age: 65
End: 2025-03-25
Payer: COMMERCIAL

## 2025-03-25 NOTE — TELEPHONE ENCOUNTER
Spoke to pt. Advised her that financial department contacted us stating she has an outstanding balance that she needs to pay on. Told her it looks like the insurance authorized the procedure on 4/8, but she has a balance. Pt stated she met with someone in the financial dept. She said she will send them 3 check stubs again to see about this. Told her just make sure to follow up with them. Patient verbalized understanding and had no further questions.

## 2025-04-07 ENCOUNTER — TELEPHONE (OUTPATIENT)
Dept: ELECTROPHYSIOLOGY | Facility: CLINIC | Age: 65
End: 2025-04-07
Payer: COMMERCIAL

## 2025-04-07 ENCOUNTER — LAB VISIT (OUTPATIENT)
Dept: LAB | Facility: HOSPITAL | Age: 65
End: 2025-04-07
Attending: INTERNAL MEDICINE
Payer: COMMERCIAL

## 2025-04-07 DIAGNOSIS — I48.19 PERSISTENT ATRIAL FIBRILLATION: Primary | ICD-10-CM

## 2025-04-07 DIAGNOSIS — I47.19 ATRIAL TACHYCARDIA: ICD-10-CM

## 2025-04-07 DIAGNOSIS — I48.19 PERSISTENT ATRIAL FIBRILLATION: ICD-10-CM

## 2025-04-07 NOTE — TELEPHONE ENCOUNTER
Spoke to patient. Informed patient  that pre op lab work needs to be completed prior to the procedure or it could be canceled. Patient has been scheduled for STAT labs at the [facility]. Patient verbalized understanding and appreciated the call.

## 2025-04-07 NOTE — NURSING TRANSFER
Nursing Transfer Note      12/6/2022     Reason patient is being transferred: inpatient bed for tikosyn    Transfer To: room 304    Transfer via wheelchair    Transfer with cardiac monitoring    Transported by EDGARD Maynard    Medicines sent: silvadene cream    Any special needs or follow-up needed: vs upon arrival    Chart send with patient: Yes    Notified: friend Rishabh at bedside    Patient reassessed at: 12/06/22 at 1500    Upon arrival to floor: patient oriented to room, call bell in reach, and bed in lowest position   "Subjective   Patient ID: Lois Jo is a 37 y.o. female who presents for Follow-up (6mo chk-discuss bp medicine).    HPI   Originally she get dizziness with high blood pressure the blood pressure medicine so last time her blood pressure was still high so we increased the amlodipine to 10 mg.  Also the other anxiety medicine to help switching to fluoxetine greatly lowered her anxiety and greatly lowered her blood pressure.  Now she was feeling fine taking the 10 mg every day, but then started to get dizziness from likely low blood pressures.    He is using the fluoxetine not using the amlodipine, and her blood pressures are low as it has been a couple of years    Continue fluoxetine 10 mg  Stop amlodipine  Continue to follow the blood pressure at home  Office visit in 6 months    Has gained a little bit of weight does have some knee pain low back pain/tailbone pain.  At this point I just wanted to continue using the ibuprofen as needed watching her diet sticking to her calorie restriction she was on before and started to get regular exercise this summer.      Review of Systems   Constitutional:  Negative for fatigue.   Respiratory:  Negative for cough and shortness of breath.    Gastrointestinal:  Negative for nausea.   Musculoskeletal:  Positive for arthralgias.   Skin:  Negative for rash.   Psychiatric/Behavioral:  Negative for sleep disturbance. The patient is not nervous/anxious.        Objective   /68   Pulse 64   Ht 1.676 m (5' 6\")   Wt 149 kg (328 lb)   SpO2 94%   BMI 52.94 kg/m²     Physical Exam  Constitutional:       Appearance: Normal appearance.   HENT:      Head: Normocephalic and atraumatic.   Cardiovascular:      Rate and Rhythm: Normal rate and regular rhythm.      Heart sounds: Normal heart sounds.   Pulmonary:      Effort: Pulmonary effort is normal.      Breath sounds: Normal breath sounds.   Skin:     General: Skin is warm and dry.   Neurological:      General: No focal deficit " present.      Mental Status: She is alert and oriented to person, place, and time.   Psychiatric:         Mood and Affect: Mood normal.         Behavior: Behavior normal.         Thought Content: Thought content normal.         Judgment: Judgment normal.         Assessment/Plan   Problem List Items Addressed This Visit             ICD-10-CM    Generalized anxiety disorder F41.1    Relevant Medications    FLUoxetine (PROzac) 10 mg capsule    Primary hypertension - Primary I10

## 2025-04-07 NOTE — TELEPHONE ENCOUNTER
Spoke to patient.     CONFIRMED procedure arrival time of 5:15 AM on 4/8/2025    Reiterated instructions including:  -Directions to check in desk  -NPO after midnight night prior to procedure  -High importance of HOLDING Losartan, Hydrochlorothiazide, and Eliquis the morning of the procedure. Patient verbalized understanding.   -Confirmed compliance of Eliquis. Instructed patient to take the morning and evening dose of Eliquis on 4/7/2025 and hold the morning dose the day of the procedure. Patient verbalized understanding.   -Pre-procedure LABS To be completed on 4/7/2025 at [facility]. Patient to have completed prior to 12:00 PM   -Confirmed absence of implanted device/stimulator   -Confirmed no fever, cough, or shortness of breath in the past 30 days  -Confirmed no redness, rash, irritation, or yeast infection to groin area.   -Reviewed current visitor policy    Patient verbalized understanding of above and appreciated the call.

## 2025-08-12 ENCOUNTER — OFFICE VISIT (OUTPATIENT)
Dept: CARDIOLOGY | Facility: CLINIC | Age: 65
End: 2025-08-12
Payer: COMMERCIAL

## 2025-08-12 VITALS
WEIGHT: 293 LBS | OXYGEN SATURATION: 97 % | SYSTOLIC BLOOD PRESSURE: 122 MMHG | DIASTOLIC BLOOD PRESSURE: 70 MMHG | HEART RATE: 47 BPM | BODY MASS INDEX: 54.53 KG/M2

## 2025-08-12 DIAGNOSIS — I50.32 CHRONIC HEART FAILURE WITH PRESERVED EJECTION FRACTION (HFPEF): ICD-10-CM

## 2025-08-12 DIAGNOSIS — I10 PRIMARY HYPERTENSION: ICD-10-CM

## 2025-08-12 DIAGNOSIS — I48.19 PERSISTENT ATRIAL FIBRILLATION: Primary | ICD-10-CM

## 2025-08-12 DIAGNOSIS — R00.1 BRADYCARDIA: ICD-10-CM

## 2025-08-12 DIAGNOSIS — R00.1 ASYMPTOMATIC BRADYCARDIA: ICD-10-CM

## 2025-08-12 PROCEDURE — 3078F DIAST BP <80 MM HG: CPT | Mod: CPTII,S$GLB,, | Performed by: INTERNAL MEDICINE

## 2025-08-12 PROCEDURE — 99214 OFFICE O/P EST MOD 30 MIN: CPT | Mod: S$GLB,,, | Performed by: INTERNAL MEDICINE

## 2025-08-12 PROCEDURE — 3288F FALL RISK ASSESSMENT DOCD: CPT | Mod: CPTII,S$GLB,, | Performed by: INTERNAL MEDICINE

## 2025-08-12 PROCEDURE — 1101F PT FALLS ASSESS-DOCD LE1/YR: CPT | Mod: CPTII,S$GLB,, | Performed by: INTERNAL MEDICINE

## 2025-08-12 PROCEDURE — 99999 PR PBB SHADOW E&M-EST. PATIENT-LVL III: CPT | Mod: PBBFAC,,, | Performed by: INTERNAL MEDICINE

## 2025-08-12 PROCEDURE — 1126F AMNT PAIN NOTED NONE PRSNT: CPT | Mod: CPTII,S$GLB,, | Performed by: INTERNAL MEDICINE

## 2025-08-12 PROCEDURE — 3008F BODY MASS INDEX DOCD: CPT | Mod: CPTII,S$GLB,, | Performed by: INTERNAL MEDICINE

## 2025-08-12 PROCEDURE — 4010F ACE/ARB THERAPY RXD/TAKEN: CPT | Mod: CPTII,S$GLB,, | Performed by: INTERNAL MEDICINE

## 2025-08-12 PROCEDURE — 3074F SYST BP LT 130 MM HG: CPT | Mod: CPTII,S$GLB,, | Performed by: INTERNAL MEDICINE

## (undated) DEVICE — PACK EP DRAPE OMC

## (undated) DEVICE — SET SMARTABLATE IRR TUBE

## (undated) DEVICE — R CATH BIDIRECTIONL DF CRV 7FR

## (undated) DEVICE — LINE PRESSURE MONITORING 96IN

## (undated) DEVICE — CATH LASSO NAV 25/15

## (undated) DEVICE — SET HBE EXT CARESITE FILTER

## (undated) DEVICE — PAD DEFIB CADENCE ADULT R2

## (undated) DEVICE — ELECTRODE REM PLYHSV RETURN 9

## (undated) DEVICE — INTRO AGILIS MED CRL 8.5F 71CM

## (undated) DEVICE — CATH THERMOCOOL SMTCH SF D F

## (undated) DEVICE — INTRODUCER HEMOSTASIS 7.5F

## (undated) DEVICE — SHEATH HEMOSTASIS 8.5FR

## (undated) DEVICE — COVER TABLE 44X90 STERILE

## (undated) DEVICE — NDL BROCKENBROUGH ADULT

## (undated) DEVICE — INTRO FAST-CATH SL1 8.5FR 63CM

## (undated) DEVICE — R CATH ACUSON ACUNAV 8FR

## (undated) DEVICE — PATCH CARTO REFERENCE

## (undated) DEVICE — NDL TRNSSPTL BRK-1 18GA 98CM

## (undated) DEVICE — COVER DRAPE ACUSON STERILE

## (undated) DEVICE — CATH TRICUSPID HALO XP 7FRX110

## (undated) DEVICE — KIT PROBE COVER WITH GEL